# Patient Record
Sex: FEMALE | Race: OTHER | HISPANIC OR LATINO | URBAN - METROPOLITAN AREA
[De-identification: names, ages, dates, MRNs, and addresses within clinical notes are randomized per-mention and may not be internally consistent; named-entity substitution may affect disease eponyms.]

---

## 2017-09-22 ENCOUNTER — EMERGENCY (EMERGENCY)
Facility: HOSPITAL | Age: 32
LOS: 1 days | Discharge: HOME | End: 2017-09-22
Attending: EMERGENCY MEDICINE
Payer: COMMERCIAL

## 2017-09-22 VITALS
SYSTOLIC BLOOD PRESSURE: 109 MMHG | WEIGHT: 233.91 LBS | TEMPERATURE: 99 F | HEART RATE: 100 BPM | DIASTOLIC BLOOD PRESSURE: 74 MMHG | RESPIRATION RATE: 18 BRPM | OXYGEN SATURATION: 99 % | HEIGHT: 61 IN

## 2017-09-22 DIAGNOSIS — Z98.89 OTHER SPECIFIED POSTPROCEDURAL STATES: Chronic | ICD-10-CM

## 2017-09-22 PROBLEM — Z00.00 ENCOUNTER FOR PREVENTIVE HEALTH EXAMINATION: Status: ACTIVE | Noted: 2017-09-22

## 2017-09-22 PROCEDURE — 99283 EMERGENCY DEPT VISIT LOW MDM: CPT

## 2017-09-22 PROCEDURE — T1013: CPT

## 2017-09-22 PROCEDURE — 99284 EMERGENCY DEPT VISIT MOD MDM: CPT

## 2017-09-22 NOTE — ED STATDOCS - THROAT FINDINGS, MLM
Inflamed tonsils/OROPHARYNGEAL EXUDATE/uvula midline/no redness tender submandibular lymphadenopathy, normal voice/THROAT RED/OROPHARYNGEAL EXUDATE/uvula midline

## 2017-09-22 NOTE — ED STATDOCS - CARE PLAN
Principal Discharge DX:	Tonsillopharyngitis  Instructions for follow-up, activity and diet:	Keep well hydrated: drink lots of COLD fluids such as smoothies, sluppees, ice water.  Take antibiotics as prescribed.  Take ibuprofen (motrin or advil) 3 tablets every 6 hours for aches and pains.  Return immediately for re-examination if symptoms worsening.

## 2017-09-22 NOTE — ED STATDOCS - OBJECTIVE STATEMENT
33 y/o F pt with no pertinent past medical hx, presents to ED c/o a HA and sore throat. Pt  says she had subjective high fever. Yesterday she went to the Dr[facility]ber[facility] and got an abx shot and takes Augmentin that was given to her for her throat pain. She is having a hard time swallowing. Denies SOB, CP, abdominal pain, ur urinary sx. No further complaints at this time. 31 y/o F pt with no pertinent past medical hx, presents to ED c/o a HA and sore throat. Pt  says she had subjective high fever. Yesterday she went to the Dr. and got an abx shot and placed on Augmentin. Reports continued pain and diff swallowing. Denies SOB, CP, abdominal pain, ur urinary sx. No further complaints at this time.

## 2017-09-22 NOTE — ED STATDOCS - PLAN OF CARE
Keep well hydrated: drink lots of COLD fluids such as smoothies, sluppees, ice water.  Take antibiotics as prescribed.  Take ibuprofen (motrin or advil) 3 tablets every 6 hours for aches and pains.  Return immediately for re-examination if symptoms worsening.

## 2020-04-28 ENCOUNTER — INPATIENT (INPATIENT)
Facility: HOSPITAL | Age: 35
LOS: 11 days | Discharge: HOME | DRG: 819 | End: 2020-05-10
Attending: FAMILY MEDICINE | Admitting: HOSPITALIST
Payer: COMMERCIAL

## 2020-04-28 ENCOUNTER — OUTPATIENT (OUTPATIENT)
Dept: INPATIENT UNIT | Facility: HOSPITAL | Age: 35
LOS: 1 days | End: 2020-04-28
Payer: COMMERCIAL

## 2020-04-28 VITALS
HEIGHT: 65 IN | OXYGEN SATURATION: 99 % | DIASTOLIC BLOOD PRESSURE: 83 MMHG | HEART RATE: 90 BPM | SYSTOLIC BLOOD PRESSURE: 123 MMHG | RESPIRATION RATE: 20 BRPM | TEMPERATURE: 99 F | WEIGHT: 207.9 LBS

## 2020-04-28 VITALS
SYSTOLIC BLOOD PRESSURE: 136 MMHG | HEART RATE: 70 BPM | RESPIRATION RATE: 18 BRPM | DIASTOLIC BLOOD PRESSURE: 70 MMHG | TEMPERATURE: 98 F

## 2020-04-28 DIAGNOSIS — Z98.89 OTHER SPECIFIED POSTPROCEDURAL STATES: Chronic | ICD-10-CM

## 2020-04-28 DIAGNOSIS — O47.02 FALSE LABOR BEFORE 37 COMPLETED WEEKS OF GESTATION, SECOND TRIMESTER: ICD-10-CM

## 2020-04-28 DIAGNOSIS — E87.6 HYPOKALEMIA: ICD-10-CM

## 2020-04-28 LAB
ALBUMIN SERPL ELPH-MCNC: 3.5 G/DL — SIGNIFICANT CHANGE UP (ref 3.3–5.2)
ALP SERPL-CCNC: 103 U/L — SIGNIFICANT CHANGE UP (ref 40–120)
ALT FLD-CCNC: 9 U/L — SIGNIFICANT CHANGE UP
ANION GAP SERPL CALC-SCNC: 12 MMOL/L — SIGNIFICANT CHANGE UP (ref 5–17)
APPEARANCE UR: CLEAR — SIGNIFICANT CHANGE UP
AST SERPL-CCNC: 13 U/L — SIGNIFICANT CHANGE UP
BASOPHILS # BLD AUTO: 0.02 K/UL — SIGNIFICANT CHANGE UP (ref 0–0.2)
BASOPHILS NFR BLD AUTO: 0.2 % — SIGNIFICANT CHANGE UP (ref 0–2)
BILIRUB SERPL-MCNC: <0.2 MG/DL — LOW (ref 0.4–2)
BILIRUB UR-MCNC: NEGATIVE — SIGNIFICANT CHANGE UP
BUN SERPL-MCNC: 8 MG/DL — SIGNIFICANT CHANGE UP (ref 8–20)
CALCIUM SERPL-MCNC: 13.8 MG/DL — CRITICAL HIGH (ref 8.6–10.2)
CHLORIDE SERPL-SCNC: 101 MMOL/L — SIGNIFICANT CHANGE UP (ref 98–107)
CHLORIDE UR-SCNC: 48 MMOL/L — SIGNIFICANT CHANGE UP
CO2 SERPL-SCNC: 24 MMOL/L — SIGNIFICANT CHANGE UP (ref 22–29)
COLOR SPEC: YELLOW — SIGNIFICANT CHANGE UP
CREAT SERPL-MCNC: 0.49 MG/DL — LOW (ref 0.5–1.3)
DIFF PNL FLD: NEGATIVE — SIGNIFICANT CHANGE UP
EOSINOPHIL # BLD AUTO: 0.06 K/UL — SIGNIFICANT CHANGE UP (ref 0–0.5)
EOSINOPHIL NFR BLD AUTO: 0.5 % — SIGNIFICANT CHANGE UP (ref 0–6)
EPI CELLS # UR: SIGNIFICANT CHANGE UP
FERRITIN SERPL-MCNC: 87 NG/ML — SIGNIFICANT CHANGE UP (ref 15–150)
GLUCOSE SERPL-MCNC: 100 MG/DL — HIGH (ref 70–99)
GLUCOSE UR QL: NEGATIVE MG/DL — SIGNIFICANT CHANGE UP
HCG SERPL-ACNC: HIGH MIU/ML
HCT VFR BLD CALC: 31 % — LOW (ref 34.5–45)
HGB BLD-MCNC: 10.5 G/DL — LOW (ref 11.5–15.5)
IMM GRANULOCYTES NFR BLD AUTO: 0.3 % — SIGNIFICANT CHANGE UP (ref 0–1.5)
IRON SATN MFR SERPL: 26 UG/DL — LOW (ref 37–145)
IRON SATN MFR SERPL: 6 % — LOW (ref 14–50)
KETONES UR-MCNC: NEGATIVE — SIGNIFICANT CHANGE UP
LEUKOCYTE ESTERASE UR-ACNC: ABNORMAL
LYMPHOCYTES # BLD AUTO: 1.67 K/UL — SIGNIFICANT CHANGE UP (ref 1–3.3)
LYMPHOCYTES # BLD AUTO: 14.6 % — SIGNIFICANT CHANGE UP (ref 13–44)
MCHC RBC-ENTMCNC: 27.6 PG — SIGNIFICANT CHANGE UP (ref 27–34)
MCHC RBC-ENTMCNC: 33.9 GM/DL — SIGNIFICANT CHANGE UP (ref 32–36)
MCV RBC AUTO: 81.4 FL — SIGNIFICANT CHANGE UP (ref 80–100)
MONOCYTES # BLD AUTO: 0.72 K/UL — SIGNIFICANT CHANGE UP (ref 0–0.9)
MONOCYTES NFR BLD AUTO: 6.3 % — SIGNIFICANT CHANGE UP (ref 2–14)
NEUTROPHILS # BLD AUTO: 8.95 K/UL — HIGH (ref 1.8–7.4)
NEUTROPHILS NFR BLD AUTO: 78.1 % — HIGH (ref 43–77)
NITRITE UR-MCNC: NEGATIVE — SIGNIFICANT CHANGE UP
PH UR: 7 — SIGNIFICANT CHANGE UP (ref 5–8)
PLATELET # BLD AUTO: 269 K/UL — SIGNIFICANT CHANGE UP (ref 150–400)
POTASSIUM SERPL-MCNC: 2.7 MMOL/L — CRITICAL LOW (ref 3.5–5.3)
POTASSIUM SERPL-SCNC: 2.7 MMOL/L — CRITICAL LOW (ref 3.5–5.3)
POTASSIUM UR-SCNC: 12 MMOL/L — SIGNIFICANT CHANGE UP
PROT SERPL-MCNC: 6.7 G/DL — SIGNIFICANT CHANGE UP (ref 6.6–8.7)
PROT UR-MCNC: NEGATIVE MG/DL — SIGNIFICANT CHANGE UP
RBC # BLD: 3.81 M/UL — SIGNIFICANT CHANGE UP (ref 3.8–5.2)
RBC # FLD: 13.8 % — SIGNIFICANT CHANGE UP (ref 10.3–14.5)
RBC CASTS # UR COMP ASSIST: NEGATIVE /HPF — SIGNIFICANT CHANGE UP (ref 0–4)
SARS-COV-2 RNA SPEC QL NAA+PROBE: SIGNIFICANT CHANGE UP
SODIUM SERPL-SCNC: 137 MMOL/L — SIGNIFICANT CHANGE UP (ref 135–145)
SODIUM UR-SCNC: 55 MMOL/L — SIGNIFICANT CHANGE UP
SODIUM UR-SCNC: 56 MMOL/L — SIGNIFICANT CHANGE UP
SP GR SPEC: 1.01 — SIGNIFICANT CHANGE UP (ref 1.01–1.02)
TIBC SERPL-MCNC: 420 UG/DL — SIGNIFICANT CHANGE UP (ref 220–430)
TRANSFERRIN SERPL-MCNC: 294 MG/DL — SIGNIFICANT CHANGE UP (ref 192–382)
TSH SERPL-MCNC: 0.38 UIU/ML — SIGNIFICANT CHANGE UP (ref 0.27–4.2)
UROBILINOGEN FLD QL: NEGATIVE MG/DL — SIGNIFICANT CHANGE UP
VIT B12 SERPL-MCNC: 274 PG/ML — SIGNIFICANT CHANGE UP (ref 232–1245)
WBC # BLD: 11.46 K/UL — HIGH (ref 3.8–10.5)
WBC # FLD AUTO: 11.46 K/UL — HIGH (ref 3.8–10.5)
WBC UR QL: SIGNIFICANT CHANGE UP

## 2020-04-28 PROCEDURE — 93010 ELECTROCARDIOGRAM REPORT: CPT

## 2020-04-28 PROCEDURE — 99285 EMERGENCY DEPT VISIT HI MDM: CPT

## 2020-04-28 PROCEDURE — 76801 OB US < 14 WKS SINGLE FETUS: CPT | Mod: 26

## 2020-04-28 PROCEDURE — 99223 1ST HOSP IP/OBS HIGH 75: CPT

## 2020-04-28 PROCEDURE — 59025 FETAL NON-STRESS TEST: CPT

## 2020-04-28 PROCEDURE — G0463: CPT

## 2020-04-28 PROCEDURE — T1013: CPT

## 2020-04-28 RX ORDER — ACETAMINOPHEN 500 MG
650 TABLET ORAL EVERY 6 HOURS
Refills: 0 | Status: DISCONTINUED | OUTPATIENT
Start: 2020-04-28 | End: 2020-04-29

## 2020-04-28 RX ORDER — ONDANSETRON 8 MG/1
4 TABLET, FILM COATED ORAL ONCE
Refills: 0 | Status: DISCONTINUED | OUTPATIENT
Start: 2020-04-28 | End: 2020-04-28

## 2020-04-28 RX ORDER — POTASSIUM CHLORIDE 20 MEQ
10 PACKET (EA) ORAL ONCE
Refills: 0 | Status: COMPLETED | OUTPATIENT
Start: 2020-04-28 | End: 2020-04-28

## 2020-04-28 RX ORDER — DEXTROSE MONOHYDRATE, SODIUM CHLORIDE, AND POTASSIUM CHLORIDE 50; .745; 4.5 G/1000ML; G/1000ML; G/1000ML
1000 INJECTION, SOLUTION INTRAVENOUS
Refills: 0 | Status: DISCONTINUED | OUTPATIENT
Start: 2020-04-28 | End: 2020-05-04

## 2020-04-28 RX ORDER — POTASSIUM CHLORIDE 20 MEQ
40 PACKET (EA) ORAL ONCE
Refills: 0 | Status: COMPLETED | OUTPATIENT
Start: 2020-04-28 | End: 2020-04-28

## 2020-04-28 RX ORDER — ACETAMINOPHEN 500 MG
650 TABLET ORAL ONCE
Refills: 0 | Status: COMPLETED | OUTPATIENT
Start: 2020-04-28 | End: 2020-04-28

## 2020-04-28 RX ORDER — ONDANSETRON 8 MG/1
4 TABLET, FILM COATED ORAL EVERY 6 HOURS
Refills: 0 | Status: DISCONTINUED | OUTPATIENT
Start: 2020-04-28 | End: 2020-04-28

## 2020-04-28 RX ORDER — SODIUM CHLORIDE 9 MG/ML
1000 INJECTION INTRAMUSCULAR; INTRAVENOUS; SUBCUTANEOUS ONCE
Refills: 0 | Status: COMPLETED | OUTPATIENT
Start: 2020-04-28 | End: 2020-04-28

## 2020-04-28 RX ORDER — ONDANSETRON 8 MG/1
4 TABLET, FILM COATED ORAL EVERY 6 HOURS
Refills: 0 | Status: DISCONTINUED | OUTPATIENT
Start: 2020-04-28 | End: 2020-05-07

## 2020-04-28 RX ORDER — MAGNESIUM SULFATE 500 MG/ML
2 VIAL (ML) INJECTION ONCE
Refills: 0 | Status: COMPLETED | OUTPATIENT
Start: 2020-04-28 | End: 2020-04-28

## 2020-04-28 RX ORDER — HEPARIN SODIUM 5000 [USP'U]/ML
5000 INJECTION INTRAVENOUS; SUBCUTANEOUS EVERY 8 HOURS
Refills: 0 | Status: DISCONTINUED | OUTPATIENT
Start: 2020-04-28 | End: 2020-05-07

## 2020-04-28 RX ADMIN — DEXTROSE MONOHYDRATE, SODIUM CHLORIDE, AND POTASSIUM CHLORIDE 100 MILLILITER(S): 50; .745; 4.5 INJECTION, SOLUTION INTRAVENOUS at 21:23

## 2020-04-28 RX ADMIN — Medication 650 MILLIGRAM(S): at 21:33

## 2020-04-28 RX ADMIN — Medication 50 GRAM(S): at 20:11

## 2020-04-28 RX ADMIN — Medication 50 GRAM(S): at 16:00

## 2020-04-28 RX ADMIN — SODIUM CHLORIDE 1000 MILLILITER(S): 9 INJECTION INTRAMUSCULAR; INTRAVENOUS; SUBCUTANEOUS at 16:56

## 2020-04-28 RX ADMIN — SODIUM CHLORIDE 1000 MILLILITER(S): 9 INJECTION INTRAMUSCULAR; INTRAVENOUS; SUBCUTANEOUS at 13:21

## 2020-04-28 RX ADMIN — Medication 40 MILLIEQUIVALENT(S): at 16:55

## 2020-04-28 RX ADMIN — Medication 100 MILLIEQUIVALENT(S): at 18:44

## 2020-04-28 RX ADMIN — Medication 100 MILLIEQUIVALENT(S): at 17:36

## 2020-04-28 RX ADMIN — Medication 100 MILLIEQUIVALENT(S): at 16:00

## 2020-04-28 RX ADMIN — Medication 650 MILLIGRAM(S): at 13:21

## 2020-04-28 NOTE — ED ADULT NURSE NOTE - OBJECTIVE STATEMENT
35y/o female c/o head and hip pain s/p fall from syncopal episode. pt aox4, resp even unlabored, NSR. pt is currently 20 weeks pregnant, negative swelling to extremities. pt denies any complication of pregnancy. pt states to feel weak x few days. will continue to monitor

## 2020-04-28 NOTE — ED PROVIDER NOTE - PHYSICAL EXAMINATION
Gen: no acute distress  Head: normocephalic, atraumatic  Lung: CTAB, no respiratory distress, no wheezing, rales, rhonchi  CV: normal s1/s2, rrr,   Abd: soft, non-tender, non-distended  MSK: No edema, no visible deformities, full range of motion in all 4 extremities  Back: no c/t/l midline tenderness or stepoffs  Neuro: No focal neurologic deficits, 5/5 strength to all extremities, sensation intact, CN 2-12 intact   Skin: No rash   Psych: normal affect

## 2020-04-28 NOTE — ED PROVIDER NOTE - CLINICAL SUMMARY MEDICAL DECISION MAKING FREE TEXT BOX
35 y/o F pt with no pmhx  currently 14 weeks pregnant presenting today after syncopal episode. No PE risk factors. EKG non-concerning. VSS. Pt cleared by OB. Will check labs, ekg, fluids, US pelvis to r/o ectopic and reeval.

## 2020-04-28 NOTE — ED PROVIDER NOTE - CARE PLAN
Principal Discharge DX:	Hypokalemia  Secondary Diagnosis:	Hypercalcemia  Secondary Diagnosis:	Pregnancy  Secondary Diagnosis:	Syncope

## 2020-04-28 NOTE — H&P ADULT - HISTORY OF PRESENT ILLNESS
35 y/o F pt  currently 14 weeks pregnant presenting today after a syncopal episode while at the grocery store. Pt states that she was standing in line when she began to feel weak and her vision slowly faded and then passed out and she woke up on the ground. Pt states she hit her head, is currently c/o headache   Pt denies any sick contact ,no leg pain , no vision problems , she admits feeling weak for few weeks , also has nausea , appetite is fair  Denies any leg pain, leg swelling. Pt denies any chest pain, dyspnea, fevers, abdominal pain, dysuria, cough, congestion, sore throat, neck pain, weakness, numbness, tingling, or other complaint. Pt was seen and evaluated by OB and cleared prior to presenting to the ED.  Labs result in the noted to have K 2.7 , high calcium admitted for severe electrolyte depletion

## 2020-04-28 NOTE — H&P ADULT - NSHPLABSRESULTS_GEN_ALL_CORE
10.5   11.46 )-----------( 269      ( 28 Apr 2020 13:00 )             31.0   04-28    137  |  101  |  8.0  ----------------------------<  100<H>  2.7<LL>   |  24.0  |  0.49<L>    Ca    13.8<HH>      28 Apr 2020 14:06  Mg     1.3     04-28    TPro  6.7  /  Alb  3.5  /  TBili  <0.2<L>  /  DBili  x   /  AST  13  /  ALT  9   /  AlkPhos  103  04-28

## 2020-04-28 NOTE — ED ADULT NURSE REASSESSMENT NOTE - NS ED NURSE REASSESS COMMENT FT1
Pt resting comfortably without any distress present. Respirations even and unlabored without any complaints. RN to continue to monitor and reassess closely.

## 2020-04-28 NOTE — OB PROVIDER TRIAGE NOTE - HISTORY OF PRESENT ILLNESS
Patient is a 35yo  at 14 weeks and 4 days coming in s/p syncopal episode. Patient was at work and lost consciousness. She feel and hit her head, did not have any convulsions noted or loss of bladder or bowel function. Is now feeling some weakness and having buttock and head pain. Denies vision changes or dizziness, N/V. Has never had an episode like this before. Denies cxts, LOF, VB. Has not yet felt fetal movement.   Pregnancy uncomplicated.    PMH: none  PSH: none  Meds: PNV  Allergies: none  OBhx: CSx2    US: +, +fetal movement   VE: deferred     A/P: Patient is a 35yo  at 14 weeks and 4 days coming in s/p syncopal episode.  -Cleared obstetrically  -Will transfer to ED for further w/up    D/W Dr. Leal

## 2020-04-28 NOTE — ED PROCEDURE NOTE - ATTENDING CONTRIBUTION TO CARE
I, Willy Logan, performed the initial face to face bedside interview with this patient regarding history of present illness, review of symptoms and relevant past medical, social and family history.  I completed an independent physical examination.  I was the initial provider who evaluated this patient. I have signed out the follow up of any pending tests (i.e. labs, radiological studies) to the resident  I have communicated the patient’s plan of care and disposition with the resident

## 2020-04-28 NOTE — ED PROVIDER NOTE - PROGRESS NOTE DETAILS
T 153. - Jim Ferrera, PGY-1 Pt noted to be markedly hypercalcemic and hypokalemic. Will order K, check mag, give fluids and reassess. - Jim Ferrera, PGY-1 Reviewed all results with pt as well as plans for admission. Pt is comfortable with plan for admission. Questions answered.

## 2020-04-28 NOTE — ED PROVIDER NOTE - DATE/TIME 2
"Discharge Planner OT   Patient plan for discharge: Not stated  Current status: Per nursing, pt okay to see. Pt agreed to therapy. Pt declined to get up to EOB. Attempted number cancellation w/patient, however pt unable to follow commands to complete tasks. Placed chapstick and washcloth to pt's left side to engage pt in visual scanning. Pt unable to locate items. Placed chapstick in Pt L hand and instructed pt to put on. Pt unable to follow commands and stated \"my siblings are a lot\"  Barriers to return to prior living situation: Level of A required, impaired mobility and ADL  Recommendations for discharge: TCU  Rationale for recommendations: Pt will require continued therapy intervention to improve safety and indep w/ ADL's and functional mobility prior to returning home.         Entered by: Anuja Nelson 06/14/2019 8:36 AM       " 28-Apr-2020 15:02

## 2020-04-28 NOTE — ED PROVIDER NOTE - ATTENDING CONTRIBUTION TO CARE
The patient seen and examined    Syncope  Hypokalemia    I, Willy Logan, performed the initial face to face bedside interview with this patient regarding history of present illness, review of symptoms and relevant past medical, social and family history.  I completed an independent physical examination.  I was the initial provider who evaluated this patient. I have signed out the follow up of any pending tests (i.e. labs, radiological studies) to the resident.  I have communicated the patient’s plan of care and disposition with the resident.

## 2020-04-28 NOTE — H&P ADULT - ASSESSMENT
33 yo F 20 weeks pregnant admitted for syncope with severe electrolyte imbalance hypokalemia , hypercalcemia and hypomagnesemia     1- Syncope liekly secondary due to severe electrolyte  depletion   replace K, mg , iv hydration   cardiac monitor     2- Electrolyte depletion / Severe Hypokalemia  / hypomagnesemia   replace iv K and Po ordered , iv mag 2 gm x2   add K to iv fluid   renal consulted   monitor electrolytes closely   UA urine electrolytes   renin aldosterone added for hypokalemia     3- Hypercalcemia   add iv fluid , monitor   PTH ordered     4- Pregnancy second trimester   ob gyn was consulted by ED   cleared stable    DVT prophylaxis

## 2020-04-28 NOTE — H&P ADULT - NSHPPHYSICALEXAM_GEN_ALL_CORE
Vital Signs Last 24 Hrs  T(C): 37.1 (28 Apr 2020 12:28), Max: 37.1 (28 Apr 2020 12:28)  T(F): 98.7 (28 Apr 2020 12:28), Max: 98.7 (28 Apr 2020 12:28)  HR: 90 (28 Apr 2020 12:28) (70 - 90)  BP: 123/83 (28 Apr 2020 12:28) (123/83 - 136/70)  BP(mean): --  RR: 20 (28 Apr 2020 12:28) (18 - 20)  SpO2: 99% (28 Apr 2020 12:28) (99% - 99%)  General : awake alert no distress   Mouth : dry oral mucosa   Neck Supple , no JVD   Resp : CTA bilateral , no wheezing   Heart : regular s1 /s2 , no murmur   Gastro : soft obese ,no tenderness , BS positive   EXT : no pretibial edema , no calf tenderness   Skin : pale color   Neuro : AXOX3,  normal speech , no motor deficits   Psychiatry : normal affect

## 2020-04-28 NOTE — ED ADULT TRIAGE NOTE - CHIEF COMPLAINT QUOTE
BIBA co headache and hip pain after a syncopal event today, pt remembers paying for her groceries and then being on the floor. pt is 20 weeks pregnant denies any fevers, cough, SOB, chest pain. pt also denies any vaginal bleeding and was cleared by Kristy CALHOUN

## 2020-04-29 LAB
24R-OH-CALCIDIOL SERPL-MCNC: 13.1 NG/ML — LOW (ref 30–80)
ALDOST SERPL-MCNC: <3 NG/DL — SIGNIFICANT CHANGE UP
ANION GAP SERPL CALC-SCNC: 13 MMOL/L — SIGNIFICANT CHANGE UP (ref 5–17)
BUN SERPL-MCNC: 10 MG/DL — SIGNIFICANT CHANGE UP (ref 8–20)
CALCIUM SERPL-MCNC: 12.7 MG/DL — HIGH (ref 8.6–10.2)
CALCIUM SERPL-MCNC: 12.9 MG/DL — HIGH (ref 8.4–10.5)
CALCIUM UR-MCNC: 10 MG/DL — SIGNIFICANT CHANGE UP
CHLORIDE SERPL-SCNC: 105 MMOL/L — SIGNIFICANT CHANGE UP (ref 98–107)
CO2 SERPL-SCNC: 21 MMOL/L — LOW (ref 22–29)
CREAT SERPL-MCNC: 0.46 MG/DL — LOW (ref 0.5–1.3)
GLUCOSE SERPL-MCNC: 94 MG/DL — SIGNIFICANT CHANGE UP (ref 70–99)
MAGNESIUM SERPL-MCNC: 1.6 MG/DL — SIGNIFICANT CHANGE UP (ref 1.6–2.6)
PHOSPHATE 24H UR-MCNC: 9.3 MG/DL — SIGNIFICANT CHANGE UP
PHOSPHATE SERPL-MCNC: 1.8 MG/DL — LOW (ref 2.4–4.7)
POTASSIUM SERPL-MCNC: 3.1 MMOL/L — LOW (ref 3.5–5.3)
POTASSIUM SERPL-SCNC: 3.1 MMOL/L — LOW (ref 3.5–5.3)
PTH-INTACT FLD-MCNC: 76 PG/ML — HIGH (ref 15–65)
RENIN DIRECT, PLASMA: 13.1 PG/ML — SIGNIFICANT CHANGE UP
SODIUM SERPL-SCNC: 139 MMOL/L — SIGNIFICANT CHANGE UP (ref 135–145)
TSH SERPL-MCNC: 0.65 UIU/ML — SIGNIFICANT CHANGE UP (ref 0.27–4.2)
UUN UR-MCNC: 118 MG/DL — SIGNIFICANT CHANGE UP

## 2020-04-29 PROCEDURE — 93306 TTE W/DOPPLER COMPLETE: CPT | Mod: 26

## 2020-04-29 PROCEDURE — 99255 IP/OBS CONSLTJ NEW/EST HI 80: CPT

## 2020-04-29 PROCEDURE — 93010 ELECTROCARDIOGRAM REPORT: CPT

## 2020-04-29 PROCEDURE — 99233 SBSQ HOSP IP/OBS HIGH 50: CPT

## 2020-04-29 RX ORDER — SODIUM,POTASSIUM PHOSPHATES 278-250MG
1 POWDER IN PACKET (EA) ORAL THREE TIMES A DAY
Refills: 0 | Status: COMPLETED | OUTPATIENT
Start: 2020-04-29 | End: 2020-04-30

## 2020-04-29 RX ORDER — FERROUS SULFATE 325(65) MG
325 TABLET ORAL DAILY
Refills: 0 | Status: DISCONTINUED | OUTPATIENT
Start: 2020-04-29 | End: 2020-05-07

## 2020-04-29 RX ORDER — ERGOCALCIFEROL 1.25 MG/1
50000 CAPSULE ORAL
Refills: 0 | Status: DISCONTINUED | OUTPATIENT
Start: 2020-04-29 | End: 2020-04-29

## 2020-04-29 RX ORDER — PREGABALIN 225 MG/1
1000 CAPSULE ORAL DAILY
Refills: 0 | Status: COMPLETED | OUTPATIENT
Start: 2020-04-29 | End: 2020-04-30

## 2020-04-29 RX ORDER — MAGNESIUM SULFATE 500 MG/ML
2 VIAL (ML) INJECTION
Refills: 0 | Status: COMPLETED | OUTPATIENT
Start: 2020-04-29 | End: 2020-04-29

## 2020-04-29 RX ORDER — IRON SUCROSE 20 MG/ML
200 INJECTION, SOLUTION INTRAVENOUS EVERY 24 HOURS
Refills: 0 | Status: COMPLETED | OUTPATIENT
Start: 2020-04-29 | End: 2020-04-30

## 2020-04-29 RX ORDER — POTASSIUM PHOSPHATE, MONOBASIC POTASSIUM PHOSPHATE, DIBASIC 236; 224 MG/ML; MG/ML
15 INJECTION, SOLUTION INTRAVENOUS ONCE
Refills: 0 | Status: COMPLETED | OUTPATIENT
Start: 2020-04-29 | End: 2020-04-29

## 2020-04-29 RX ORDER — ASCORBIC ACID 60 MG
1000 TABLET,CHEWABLE ORAL DAILY
Refills: 0 | Status: DISCONTINUED | OUTPATIENT
Start: 2020-04-29 | End: 2020-05-07

## 2020-04-29 RX ORDER — MAGNESIUM OXIDE 400 MG ORAL TABLET 241.3 MG
400 TABLET ORAL
Refills: 0 | Status: COMPLETED | OUTPATIENT
Start: 2020-04-29 | End: 2020-05-01

## 2020-04-29 RX ORDER — ACETAMINOPHEN 500 MG
975 TABLET ORAL EVERY 6 HOURS
Refills: 0 | Status: DISCONTINUED | OUTPATIENT
Start: 2020-04-29 | End: 2020-05-07

## 2020-04-29 RX ADMIN — Medication 325 MILLIGRAM(S): at 11:36

## 2020-04-29 RX ADMIN — Medication 50 GRAM(S): at 12:42

## 2020-04-29 RX ADMIN — Medication 650 MILLIGRAM(S): at 05:02

## 2020-04-29 RX ADMIN — DEXTROSE MONOHYDRATE, SODIUM CHLORIDE, AND POTASSIUM CHLORIDE 100 MILLILITER(S): 50; .745; 4.5 INJECTION, SOLUTION INTRAVENOUS at 12:21

## 2020-04-29 RX ADMIN — Medication 1 PACKET(S): at 13:45

## 2020-04-29 RX ADMIN — IRON SUCROSE 110 MILLIGRAM(S): 20 INJECTION, SOLUTION INTRAVENOUS at 11:35

## 2020-04-29 RX ADMIN — MAGNESIUM OXIDE 400 MG ORAL TABLET 400 MILLIGRAM(S): 241.3 TABLET ORAL at 18:16

## 2020-04-29 RX ADMIN — Medication 1 PACKET(S): at 21:33

## 2020-04-29 RX ADMIN — POTASSIUM PHOSPHATE, MONOBASIC POTASSIUM PHOSPHATE, DIBASIC 62.5 MILLIMOLE(S): 236; 224 INJECTION, SOLUTION INTRAVENOUS at 13:41

## 2020-04-29 RX ADMIN — MAGNESIUM OXIDE 400 MG ORAL TABLET 400 MILLIGRAM(S): 241.3 TABLET ORAL at 11:36

## 2020-04-29 RX ADMIN — Medication 1 TABLET(S): at 11:36

## 2020-04-29 RX ADMIN — HEPARIN SODIUM 5000 UNIT(S): 5000 INJECTION INTRAVENOUS; SUBCUTANEOUS at 13:50

## 2020-04-29 RX ADMIN — ERGOCALCIFEROL 50000 UNIT(S): 1.25 CAPSULE ORAL at 12:15

## 2020-04-29 RX ADMIN — PREGABALIN 1000 MICROGRAM(S): 225 CAPSULE ORAL at 12:15

## 2020-04-29 RX ADMIN — Medication 1000 MILLIGRAM(S): at 12:10

## 2020-04-29 RX ADMIN — Medication 975 MILLIGRAM(S): at 13:50

## 2020-04-29 RX ADMIN — Medication 50 GRAM(S): at 18:15

## 2020-04-29 RX ADMIN — HEPARIN SODIUM 5000 UNIT(S): 5000 INJECTION INTRAVENOUS; SUBCUTANEOUS at 21:32

## 2020-04-29 NOTE — CONSULT NOTE ADULT - ATTENDING COMMENTS
Pt was seen and examined. Pt with episode of syncope. No prior syncope. No leg edema or calf tenderness.   EKG with NSR, no st/t changes, qtc short with hypokalemia.  Denies any use of diuretics.   Pt is being evaluated by nephrology.  TTE normal today.  Monitor on tele.  Plan for outpt event monitor.   I alexy with a/p.

## 2020-04-29 NOTE — PROGRESS NOTE ADULT - ASSESSMENT
35 yo F 14 weeks pregnant ( per her GYN attending ) , reported recent COVID positive test admittted for syncope , severe hypokalemia , hypomagnesemia and hypercalcemia     1- Syncope likely due to electrolyte imbalance   replace lytes   cont cardiac monitor   will do repeat ECG     2- Hypokalemia / hypomagnesemi/ hypophosphatemia   replace iv mg , K phos iv and K supplement ordered   repeat lytes in am     3- Hypercalcemia better with hydration further work up pending per renal   cont iv fluid suspected dehydration     4- Anemia of iron deficiency and vit b12 low   add ferrous supplement   vit b 12 injection x2 days then po     5- Vit D deficiency   ergocalciferol ordered     6- Recent COVID infection positive 3/26 per her gyn attending outpatient records   repeat neg on 4/22 and 4/29

## 2020-04-29 NOTE — CONSULT NOTE ADULT - ASSESSMENT
A/P:  33 y/o F  currently 14 weeks pregnant with no PMHx who presented to the ED after a syncopal episode. Patient was feeling well after recovering from Covid a month ago, but yesterday she was at the grocery store when suddenly she saw black, and woke up on the ground. Patient states she hit her head, and has been having a headache ever since. Patient denies any palpitations, dizziness, nausea, or vomitting before the syncopal episode occurred. Patient states she had chest pain about 9 mos ago, but was diagnosed with musculoskeletal pain. Patient was found upon arrival to have hypokalemia, hypomagnesia, and hypercalcemia. Patient is being followed by Nephrology who is working her up for possible Gitelman's Syndrome. Patient was found to have PVCs on EKG and telemetry, and had Mobitz I. Patient is currently feeling ok, still with a headache, but denies any fevers, chills, chest pain, dyspnea, orthopnea, PND, leg swelling, abdominal pain, N/V/D.     1. Syncope  - Concern for arrhythmia secondary to severe electrolyte disturbances  - Keep K>4, Mg>2 if able  - Obtain TTE  - Continue telemetry monitoring.   - Will set up for 21 day MCOT monitor as an outpatient.     2. Electrolyte Disturbances  - Possible Gitelman's Syndrome  - Renal following  - Management per the primary team.     3. Pregnancy  - Management per primary team.     Preliminary evaluation, please await official recommendations by Dr. Renner

## 2020-04-29 NOTE — CONSULT NOTE ADULT - SUBJECTIVE AND OBJECTIVE BOX
[place] [facility]                                                               Office:  [address] [address]                                                              Telephone: [phone]. Fax:[phone]                                                                        CARDIOLOGY CONSULTATION NOTE                                                                                             Consult requested by:  Dr. Parker   Reason for Consultation: Syncope  History obtained by: Patient and medical record   obtained: No    Chief complaint:    Patient is a 34y old  Female who presents with a chief complaint of hypokalemia, syncope (2020 11:11)      HPI: 33 y/o F  currently 14 weeks pregnant with no PMHx who presented to the ED after a syncopal episode.   33 y/o F pt  currently 14 weeks pregnant presenting today after a syncopal episode while at the grocery store. Pt states that she was standing in line when she began to feel weak and her vision slowly faded and then passed out and she woke up on the ground. Pt states she hit her head, is currently c/o headache   Pt denies any sick contact ,no leg pain , no vision problems , she admits feeling weak for few weeks , also has nausea , appetite is fair  Denies any leg pain, leg swelling. Pt denies any chest pain, dyspnea, fevers, abdominal pain, dysuria, cough, congestion, sore throat, neck pain, weakness, numbness, tingling, or other complaint. Pt was seen and evaluated by OB and cleared prior to presenting to the ED.  Labs result in the noted to have K 2.7 , high calcium admitted for severe electrolyte depletion (2020 16:15)        REVIEW OF SYMPTOMS:     CONSTITUTIONAL: No fever, weight loss, or fatigue  ENMT:  No difficulty hearing, tinnitus, vertigo; No sinus or throat pain  NECK: No pain or stiffness  CARDIOVASCULAR: chest pain, dyspnea, syncope, palpitations, dizziness, Orthopnea, Paroxsymal nocturnal dyspnea  RESPIRATORY: Dyspnea on exertion, Shortness of breath, cough, wheezing  : No dysuria, no hematuria   GI: No dark color stool, no melena, no diarrhea, no constipation, no abdominal pain   NEURO: No headache, no dizziness, no slurred speech   MUSCULOSKELETAL: No joint pain or swelling; No muscle, back, or extremity pain  PSYCH: No agitation, no anxiety.    ALL OTHER REVIEW OF SYSTEMS ARE NEGATIVE.      PREVIOUS DIAGNOSTIC TESTING  ECHO FINDINGS:      STRESS FINDINGS:      CATHETERIZATION FINDINGS:         ALLERGIES: Allergies    No Known Allergies    Intolerances          PAST MEDICAL HISTORY  No pertinent past medical history      PAST SURGICAL HISTORY  S/P       FAMILY HISTORY:      SOCIAL HISTORY:  Denies smoking/alcohol/drugs  CIGARETTES:     ALCOHOL:  DRUGS:      CURRENT MEDICATIONS:     ascorbic acid  cyanocobalamin Injectable  ferrous    sulfate  heparin   Injectable  iron sucrose IVPB  magnesium oxide  magnesium sulfate  IVPB  potassium phosphate / sodium phosphate powder  prenatal multivitamin  sodium chloride 0.9% with potassium chloride 40 mEq/L        HOME MEDICATIONS:      Vital Signs Last 24 Hrs  T(C): 37.1 (2020 10:11), Max: 37.1 (2020 17:22)  T(F): 98.8 (2020 10:11), Max: 98.8 (2020 17:22)  HR: 85 (2020 10:11) (80 - 96)  BP: 116/74 (2020 10:11) (100/63 - 124/75)  BP(mean): --  RR: 18 (2020 10:11) (16 - 18)  SpO2: 96% (2020 10:11) (96% - 99%)      PHYSICAL EXAM:  Constitutional: Comfortable . No acute distress.   HEENT: Atraumatic and normocephalic , neck is supple . no JVD. No carotid bruit. PEERL   CNS: A&Ox3. No focal deficits. EOMI. Cranial nerves II-IX are intact.   Lymph Nodes: Cervical : Not palpable.  Respiratory: CTAB  Cardiovascular: S1S2 RRR. No murmur/rubs or gallop.  Gastrointestinal: Soft non-tender and non distended . +Bowel sounds. negative Pepe's sign.  Extremities: No edema.   Psychiatric: Calm . no agitation.  Skin: No skin rash/ulcers visualized to face, hands or feet.    Intake and output:     LABS:                        10.5   11.46 )-----------( 269      ( 2020 13:00 )             31.0     -    139  |  105  |  10.0  ----------------------------<  94  3.1<L>   |  21.0<L>  |  0.46<L>    Ca    12.7<H>      2020 06:41  Phos  1.8       Mg     1.6     04-29    TPro  6.7  /  Alb  3.5  /  TBili  <0.2<L>  /  DBili  x   /  AST  13  /  ALT  9   /  AlkPhos  103  -      ;p-BNP=    Urinalysis Basic - ( 2020 22:15 )    Color: Yellow / Appearance: Clear / S.010 / pH: x  Gluc: x / Ketone: Negative  / Bili: Negative / Urobili: Negative mg/dL   Blood: x / Protein: Negative mg/dL / Nitrite: Negative   Leuk Esterase: Small / RBC: Negative /HPF / WBC 0-2   Sq Epi: x / Non Sq Epi: Occasional / Bacteria: x        INTERPRETATION OF TELEMETRY: Reviewed by me.   ECG: Reviewed by me.     RADIOLOGY & ADDITIONAL STUDIES:    X-ray:  reviewed by me.   CT scan:   MRI: [place] [facility]                                                               Office:  [address] [address]                                                              Telephone: [phone]. Fax:[phone]                                                                        CARDIOLOGY CONSULTATION NOTE                                                                                             Consult requested by:  Dr. Parker   Reason for Consultation: Syncope  History obtained by: Patient and medical record   obtained: No    Chief complaint:    Patient is a 34y old  Female who presents with a chief complaint of hypokalemia, syncope (2020 11:11)      HPI: 33 y/o F  currently 14 weeks pregnant with no PMHx who presented to the ED after a syncopal episode. Patient was feeling well after recovering from Covid a month ago, but yesterday she was at the grocery store when suddenly she saw black, and woke up on the ground. Patient states she hit her head, and has been having a headache ever since. Patient denies any palpitations, dizziness, nausea, or vomitting before the syncopal episode occurred. Patient states she had chest pain about 9 mos ago, but was diagnosed with musculoskeletal pain. Patient was found upon arrival to have hypokalemia, hypomagnesia, and hypercalcemia. Patient is being followed by Nephrology who is working her up for possible Gitelman's Syndrome. Patient was found to have PVCs on EKG and telemetry, and had Mobitz I. Patient is currently feeling ok, still with a headache, but denies any fevers, chills, chest pain, dyspnea, orthopnea, PND, leg swelling, abdominal pain, N/V/D.     REVIEW OF SYMPTOMS:     CONSTITUTIONAL: No fever, weight loss, or fatigue  ENMT:  No difficulty hearing, tinnitus, vertigo; No sinus or throat pain  NECK: No pain or stiffness  CARDIOVASCULAR: AS PER HPI  RESPIRATORY: Dyspnea on exertion, Shortness of breath, cough, wheezing  : No dysuria, no hematuria   GI: No dark color stool, no melena, no diarrhea, no constipation, no abdominal pain   NEURO: AS PER HPI  MUSCULOSKELETAL: No joint pain or swelling; No muscle, back, or extremity pain  PSYCH: No agitation, no anxiety.    ALL OTHER REVIEW OF SYSTEMS ARE NEGATIVE.      PREVIOUS DIAGNOSTIC TESTING  ECHO FINDINGS: Pending    ALLERGIES: Allergies    No Known Allergies    Intolerances      PAST MEDICAL HISTORY  No pertinent past medical history      PAST SURGICAL HISTORY  S/P       FAMILY HISTORY: Mother- Hypokalemia  Father- "heart issues" at 68      SOCIAL HISTORY:  Denies smoking/alcohol/drugs    CURRENT MEDICATIONS:     ascorbic acid  cyanocobalamin Injectable  ferrous    sulfate  heparin   Injectable  iron sucrose IVPB  magnesium oxide  magnesium sulfate  IVPB  potassium phosphate / sodium phosphate powder  prenatal multivitamin  sodium chloride 0.9% with potassium chloride 40 mEq/L      HOME MEDICATIONS:    Vital Signs Last 24 Hrs  T(C): 37.1 (2020 10:11), Max: 37.1 (2020 17:22)  T(F): 98.8 (2020 10:11), Max: 98.8 (2020 17:22)  HR: 85 (2020 10:11) (80 - 96)  BP: 116/74 (2020 10:11) (100/63 - 124/75)  RR: 18 (2020 10:11) (16 - 18)  SpO2: 96% (2020 10:11) (96% - 99%)      PHYSICAL EXAM:  Constitutional: Comfortable . No acute distress.   HEENT: Atraumatic and normocephalic , neck is supple . no JVD. No carotid bruit. PEERL   CNS: A&Ox3. No focal deficits. EOMI. Cranial nerves II-IX are intact.   Lymph Nodes: Cervical : Not palpable.  Respiratory: CTAB  Cardiovascular: S1S2 RRR. No murmur/rubs or gallop.  Gastrointestinal: Soft non-tender and non distended . +Bowel sounds. negative Pepe's sign.  Extremities: No edema.   Psychiatric: Calm . no agitation.  Skin: No skin rash/ulcers visualized to face, hands or feet.    Intake and output:     LABS:                        10.5   11.46 )-----------( 269      ( 2020 13:00 )             31.0     04-29    139  |  105  |  10.0  ----------------------------<  94  3.1<L>   |  21.0<L>  |  0.46<L>    Ca    12.7<H>      2020 06:41  Phos  1.8       Mg     1.6         TPro  6.7  /  Alb  3.5  /  TBili  <0.2<L>  /  DBili  x   /  AST  13  /  ALT  9   /  AlkPhos  103  -      ;p-BNP=    Urinalysis Basic - ( 2020 22:15 )    Color: Yellow / Appearance: Clear / S.010 / pH: x  Gluc: x / Ketone: Negative  / Bili: Negative / Urobili: Negative mg/dL   Blood: x / Protein: Negative mg/dL / Nitrite: Negative   Leuk Esterase: Small / RBC: Negative /HPF / WBC 0-2   Sq Epi: x / Non Sq Epi: Occasional / Bacteria: x        INTERPRETATION OF TELEMETRY: Reviewed by me. SR with 1st degree AV block, PVCs, Mobitz I  ECG: Reviewed by me. SR 1st degree AV block, PVCs, NSST/T wave abnormalities     RADIOLOGY & ADDITIONAL STUDIES:

## 2020-04-29 NOTE — PROGRESS NOTE ADULT - SUBJECTIVE AND OBJECTIVE BOX
Patient is a 34y old  Female who presents with a chief complaint of hypokalemia, syncope  seen by  Chelsey , pt is c/o headache on the right and was feeling dizzy walking to the bathroom earlier   denies calf tenderness , no CP no SOB     * spoke to her GYN Dr Pepe apparently pt was tested positive fr COVID in  in [facility] , repeat COVID testing neg * , pt denied when asked yesterday     labs reviewed     cardiac monitor with ST     Allergies    No Known Allergies    Intolerances          Vital Signs Last 24 Hrs  T(C): 37.1 (2020 07:28), Max: 37.1 (2020 12:28)  T(F): 98.8 (2020 07:28), Max: 98.8 (2020 17:22)  HR: 83 (2020 07:28) (70 - 96)  BP: 102/65 (2020 07:28) (100/63 - 136/70)  BP(mean): --  RR: 18 (2020 07:28) (16 - 20)  SpO2: 99% (2020 07:28) (98% - 99%)    PHYSICAL EXAM:    GENERAL: NAD, well-groomed, well-developed  EYES: conjunctiva and sclera clear  NECK: Supple, No JVD,   NERVOUS SYSTEM:  Alert & Oriented X3, Good concentration; no motor deficits   CHEST/LUNG: Clear to auscultation bilaterally; No rales, rhonchi  HEART: Regular rate and rhythm; S1 /s2 tachycardic   ABDOMEN: Soft, Nontender, Nondistended; Bowel sounds present  EXTREMITIES:  No clubbing, cyanosis, or edema        LABS:                        10.5   11.46 )-----------( 269      ( 2020 13:00 )             31.0     04-29    139  |  105  |  10.0  ----------------------------<  94  3.1<L>   |  21.0<L>  |  0.46<L>    Ca    12.7<H>      2020 06:41  Phos  1.8       Mg     1.6         TPro  6.7  /  Alb  3.5  /  TBili  <0.2<L>  /  DBili  x   /  AST  13  /  ALT  9   /  AlkPhos  103    Vitamin D, 25-Hydroxy: 13.1: VITD Interpretive Data Result:  30.0-80.0 ng/mL Optimal levels (Reference Range)      Vitamin B12, Serum: 274 pg/mL (20 @ 18:11)        Urinalysis Basic - ( 2020 22:15 )    Color: Yellow / Appearance: Clear / S.010 / pH: x  Gluc: x / Ketone: Negative  / Bili: Negative / Urobili: Negative mg/dL   Blood: x / Protein: Negative mg/dL / Nitrite: Negative   Leuk Esterase: Small / RBC: Negative /HPF / WBC 0-2   Sq Epi: x / Non Sq Epi: Occasional / Bacteria: x        RADIOLOGY & ADDITIONAL TESTS:

## 2020-04-29 NOTE — CONSULT NOTE ADULT - SUBJECTIVE AND OBJECTIVE BOX
[facility] DIVISION OF KIDNEY DISEASES AND HYPERTENSION -- INITIAL CONSULT NOTE  --------------------------------------------------------------------------------  HPI:  35 y/o female pt ( currently 14 weeks pregnant )presented after a syncopal episode while at the grocery store. Pt reported feeling weak when standing in line, her vision faded slowly and she then woke up on the ground. Pt was tested positive for COVID-19 in march but repeat testing in ED was negative. Pt found to have hypokalemia, hypercalcemia and hypermagnesemia.      PAST HISTORY  --------------------------------------------------------------------------------  PAST MEDICAL & SURGICAL HISTORY:  No pertinent past medical history  S/P : x2    FAMILY HISTORY: FH of hypokalemia in mother    PAST SOCIAL HISTORY:     ALLERGIES & MEDICATIONS  --------------------------------------------------------------------------------  Allergies    No Known Allergies    Intolerances      Standing Inpatient Medications  ascorbic acid 1000 milliGRAM(s) Oral daily  cyanocobalamin Injectable 1000 MICROGram(s) SubCutaneous daily  ergocalciferol 67742 Unit(s) Oral every week  ferrous    sulfate 325 milliGRAM(s) Oral daily  heparin   Injectable 5000 Unit(s) SubCutaneous every 8 hours  iron sucrose IVPB 200 milliGRAM(s) IV Intermittent every 24 hours  magnesium oxide 400 milliGRAM(s) Oral three times a day with meals  magnesium sulfate  IVPB 2 Gram(s) IV Intermittent every 1 hour  potassium phosphate / sodium phosphate powder 1 Packet(s) Oral three times a day  potassium phosphate IVPB 15 milliMole(s) IV Intermittent once  prenatal multivitamin 1 Tablet(s) Oral daily  sodium chloride 0.9% with potassium chloride 40 mEq/L 1000 milliLiter(s) IV Continuous <Continuous>    PRN Inpatient Medications  acetaminophen   Tablet .. 975 milliGRAM(s) Oral every 6 hours PRN  ondansetron Injectable 4 milliGRAM(s) IV Push every 6 hours PRN      REVIEW OF SYSTEMS  --------------------------------------------------------------------------------  Gen: No weight changes, fatigue, fevers/chills, weakness  Skin: No rashes  Head/Eyes/Ears/Mouth: No headache; Normal hearing; Normal vision w/o blurriness; No sinus pain/discomfort, sore throat  Respiratory: No dyspnea, cough, wheezing, hemoptysis  CV: No chest pain, PND, orthopnea  GI: No abdominal pain, diarrhea, constipation, nausea, vomiting, melena, hematochezia  : No increased frequency, dysuria, hematuria, nocturia  MSK: No joint pain/swelling; no back pain; no edema  Neuro: No dizziness/lightheadedness, weakness, seizures, numbness, tingling  Heme: No easy bruising or bleeding  Endo: No heat/cold intolerance  Psych: No significant nervousness, anxiety, stress, depression    All other systems were reviewed and are negative, except as noted.    VITALS/PHYSICAL EXAM  --------------------------------------------------------------------------------  T(C): 37.1 (20 @ 10:11), Max: 37.1 (20 @ 12:28)  HR: 85 (20 @ 10:11) (70 - 96)  BP: 116/74 (20 @ 10:11) (100/63 - 136/70)  RR: 18 (20 @ 10:11) (16 - 20)  SpO2: 96% (20 @ 10:11) (96% - 99%)  Wt(kg): --  Height (cm): 165.1 (20 @ 12:28)  Weight (kg): 94.3 (20 @ 12:28)  BMI (kg/m2): 34.6 (20 @ 12:28)  BSA (m2): 2.01 (20 @ 12:28)      Physical Exam:  	Gen: NAD, well-appearing  	HEENT: PERRL, supple neck, clear oropharynx  	Pulm: CTA B/L  	CV: RRR, S1S2; no rub  	Back: No spinal or CVA tenderness; no sacral edema  	Abd: +BS, soft, nontender/nondistended  	: No suprapubic tenderness  	UE: Warm, FROM, no clubbing, intact strength; no edema; no asterixis  	LE: Warm, FROM, no clubbing, intact strength; no edema  	Neuro: No focal deficits, intact gait  	Psych: Normal affect and mood  	Skin: Warm, without rashes  	Vascular access:    LABS/STUDIES  --------------------------------------------------------------------------------              10.5   11.46 >-----------<  269      [20 @ 13:00]              31.0     139  |  105  |  10.0  ----------------------------<  94      [20 @ 06:41]  3.1   |  21.0  |  0.46        Ca     12.7     [20 @ 06:41]      Mg     1.6     [20 @ 06:41]      Phos  1.8     [20 @ 06:41]    TPro  6.7  /  Alb  3.5  /  TBili  <0.2  /  DBili  x   /  AST  13  /  ALT  9   /  AlkPhos  103  [20 @ 14:06]          Creatinine Trend:  SCr 0.46 [ 06:41]  SCr 0.49 [ 14:06]    Urinalysis - [20 @ 22:15]      Color Yellow / Appearance Clear / SG 1.010 / pH 7.0      Gluc Negative / Ketone Negative  / Bili Negative / Urobili Negative       Blood Negative / Protein Negative / Leuk Est Small / Nitrite Negative      RBC Negative / WBC 0-2 / Hyaline  / Gran  / Sq Epi  / Non Sq Epi Occasional / Bacteria     Urine Sodium 55      [20 @ 22:13]  Urine Urea Nitrogen 118      [20 @ 00:58]  Urine Potassium 12      [20 @ 22:13]  Urine Chloride 48      [20 22:13]  Urine Phosphorus 9.3      [20 @ 00:58]    Iron 26, TIBC 420, %sat 6      [20 @ 18:11]  Ferritin 87      [20 @ 18:11]  PTH -- (Ca 12.9)      [20 @ 01:20]   76  Vitamin D (25OH) 13.1      [20 @ 01:20]  TSH 0.65      [20 @ 06:41] [facility] DIVISION OF KIDNEY DISEASES AND HYPERTENSION -- INITIAL CONSULT NOTE  --------------------------------------------------------------------------------  HPI:  33 y/o female pt ( currently 14 weeks pregnant )presented after a syncopal episode while at the grocery store. Pt reported feeling weak when standing in line, her vision faded slowly and she then woke up on the ground. Pt was tested positive for COVID-19 in march but repeat testing in ED was negative. Pt found to have hypokalemia, hypercalcemia and hypermagnesemia. Nephrology consulted for further evaluation.    Pt seen and examined at bedside. She is complaining of headache; denies any other complaint. Denies history of hypokalemia in the past. Denies nausea, vomiting, diarrhea, decreased po intake. Her only medication is prenatal vitamins. Reports history of low potassium in mother.       PAST HISTORY  --------------------------------------------------------------------------------  PAST MEDICAL & SURGICAL HISTORY:  No pertinent past medical history  S/P : x2    FAMILY HISTORY: FH of hypokalemia in mother    PAST SOCIAL HISTORY: ; works at a factory    ALLERGIES & MEDICATIONS  --------------------------------------------------------------------------------  Allergies    No Known Allergies    Intolerances      Standing Inpatient Medications  ascorbic acid 1000 milliGRAM(s) Oral daily  cyanocobalamin Injectable 1000 MICROGram(s) SubCutaneous daily  ergocalciferol 33142 Unit(s) Oral every week  ferrous    sulfate 325 milliGRAM(s) Oral daily  heparin   Injectable 5000 Unit(s) SubCutaneous every 8 hours  iron sucrose IVPB 200 milliGRAM(s) IV Intermittent every 24 hours  magnesium oxide 400 milliGRAM(s) Oral three times a day with meals  magnesium sulfate  IVPB 2 Gram(s) IV Intermittent every 1 hour  potassium phosphate / sodium phosphate powder 1 Packet(s) Oral three times a day  potassium phosphate IVPB 15 milliMole(s) IV Intermittent once  prenatal multivitamin 1 Tablet(s) Oral daily  sodium chloride 0.9% with potassium chloride 40 mEq/L 1000 milliLiter(s) IV Continuous <Continuous>    PRN Inpatient Medications  acetaminophen   Tablet .. 975 milliGRAM(s) Oral every 6 hours PRN  ondansetron Injectable 4 milliGRAM(s) IV Push every 6 hours PRN      REVIEW OF SYSTEMS  --------------------------------------------------------------------------------  Gen: No weight changes, fatigue, fevers/chills, weakness  Skin: No rashes  Head/Eyes/Ears/Mouth:  headache+; Normal hearing; Normal vision w/o blurriness; No sinus pain/discomfort, sore throat  Respiratory: No dyspnea, cough, wheezing, hemoptysis  CV: No chest pain, PND, orthopnea  GI: No abdominal pain, diarrhea, constipation, nausea, vomiting, melena, hematochezia  : No increased frequency, dysuria, hematuria, nocturia  MSK: No joint pain/swelling; no back pain; no edema  Neuro: No dizziness/lightheadedness, weakness, seizures, numbness, tingling  Heme: No easy bruising or bleeding  Endo: No heat/cold intolerance  Psych: No significant nervousness, anxiety, stress, depression    All other systems were reviewed and are negative, except as noted.    VITALS/PHYSICAL EXAM  --------------------------------------------------------------------------------  T(C): 37.1 (20 @ 10:11), Max: 37.1 (20 @ 12:28)  HR: 85 (04-29-20 @ 10:11) (70 - 96)  BP: 116/74 (20 10:11) (100/63 - 136/70)  RR: 18 (20 10:11) (16 - 20)  SpO2: 96% (20 10:11) (96% - 99%)  Wt(kg): --  Height (cm): 165.1 (20 @ 12:28)  Weight (kg): 94.3 (20 @ 12:28)  BMI (kg/m2): 34.6 (20 @ 12:28)  BSA (m2): 2.01 (20 12:28)      Physical Exam:  	Gen: NAD, well-appearing  	HEENT: PERRL, supple neck  	Pulm: CTA B/L  	CV: RRR, S1S2; no rub  	Back: No spinal or CVA tenderness; no sacral edema  	Abd: +BS, soft, nontender/gravid abdomen+  	: No suprapubic tenderness  	UE: Warm, no edema; no asterixis  	LE: Warm,  no edema  	Neuro: No focal deficits  	Psych: Normal affect and mood  	Skin: Warm      LABS/STUDIES  --------------------------------------------------------------------------------              10.5   11.46 >-----------<  269      [20 @ 13:00]              31.0     139  |  105  |  10.0  ----------------------------<  94      [20 @ 06:41]  3.1   |  21.0  |  0.46        Ca     12.7     [20 @ 06:41]      Mg     1.6     [20 06:41]      Phos  1.8     [20:41]    TPro  6.7  /  Alb  3.5  /  TBili  <0.2  /  DBili  x   /  AST  13  /  ALT  9   /  AlkPhos  103  [20 14:06]          Creatinine Trend:  SCr 0.46 [:41]  SCr 0.49 [ 14:06]    Urinalysis - [20 22:15]      Color Yellow / Appearance Clear / SG 1.010 / pH 7.0      Gluc Negative / Ketone Negative  / Bili Negative / Urobili Negative       Blood Negative / Protein Negative / Leuk Est Small / Nitrite Negative      RBC Negative / WBC 0-2 / Hyaline  / Gran  / Sq Epi  / Non Sq Epi Occasional / Bacteria     Urine Sodium 55      [20 22:13]  Urine Urea Nitrogen 118      [20 @ 00:58]  Urine Potassium 12      [20 22:13]  Urine Chloride 48      [20 22:13]  Urine Phosphorus 9.3      [20 00:58]    Iron 26, TIBC 420, %sat 6      [20 18:11]  Ferritin 87      [20 18:11]  PTH -- (Ca 12.9)      [20 01:20]   76  Vitamin D (25OH) 13.1      [20 01:20]  TSH 0.65      [20 06:41] [facility] DIVISION OF KIDNEY DISEASES AND HYPERTENSION -- INITIAL CONSULT NOTE  --------------------------------------------------------------------------------  HPI:  33 y/o female pt ( currently 14 weeks pregnant )presented after a syncopal episode while at the grocery store. Pt reported feeling weak when standing in line, her vision faded slowly and she then woke up on the ground. Pt was tested positive for COVID-19 in march but repeat testing in ED was negative. Pt found to have hypokalemia, hypercalcemia and hypermagnesemia. Nephrology consulted for further evaluation.    Pt seen and examined at bedside. She is complaining of headache; denies any other complaint. Denies history of hypokalemia in the past. Denies nausea, vomiting, diarrhea, decreased po intake. Reports she was feeling weak for the past few weeks. Her only medication is prenatal vitamins. Reports history of low potassium in mother.       PAST HISTORY  --------------------------------------------------------------------------------  PAST MEDICAL & SURGICAL HISTORY:  No pertinent past medical history  S/P : x2    FAMILY HISTORY: FH of hypokalemia in mother    PAST SOCIAL HISTORY: ; works at a factory    ALLERGIES & MEDICATIONS  --------------------------------------------------------------------------------  Allergies    No Known Allergies    Intolerances      Standing Inpatient Medications  ascorbic acid 1000 milliGRAM(s) Oral daily  cyanocobalamin Injectable 1000 MICROGram(s) SubCutaneous daily  ergocalciferol 07317 Unit(s) Oral every week  ferrous    sulfate 325 milliGRAM(s) Oral daily  heparin   Injectable 5000 Unit(s) SubCutaneous every 8 hours  iron sucrose IVPB 200 milliGRAM(s) IV Intermittent every 24 hours  magnesium oxide 400 milliGRAM(s) Oral three times a day with meals  magnesium sulfate  IVPB 2 Gram(s) IV Intermittent every 1 hour  potassium phosphate / sodium phosphate powder 1 Packet(s) Oral three times a day  potassium phosphate IVPB 15 milliMole(s) IV Intermittent once  prenatal multivitamin 1 Tablet(s) Oral daily  sodium chloride 0.9% with potassium chloride 40 mEq/L 1000 milliLiter(s) IV Continuous <Continuous>    PRN Inpatient Medications  acetaminophen   Tablet .. 975 milliGRAM(s) Oral every 6 hours PRN  ondansetron Injectable 4 milliGRAM(s) IV Push every 6 hours PRN      REVIEW OF SYSTEMS  --------------------------------------------------------------------------------  Gen: No weight changes, fatigue, fevers/chills, weakness  Skin: No rashes  Head/Eyes/Ears/Mouth:  headache+; Normal hearing; Normal vision w/o blurriness; No sinus pain/discomfort, sore throat  Respiratory: No dyspnea, cough, wheezing, hemoptysis  CV: No chest pain, PND, orthopnea  GI: No abdominal pain, diarrhea, constipation, nausea, vomiting, melena, hematochezia  : No increased frequency, dysuria, hematuria, nocturia  MSK: No joint pain/swelling; no back pain; no edema  Neuro: No dizziness/lightheadedness, weakness, seizures, numbness, tingling  Heme: No easy bruising or bleeding  Endo: No heat/cold intolerance  Psych: No significant nervousness, anxiety, stress, depression    All other systems were reviewed and are negative, except as noted.    VITALS/PHYSICAL EXAM  --------------------------------------------------------------------------------  T(C): 37.1 (20 @ 10:11), Max: 37.1 (20 @ 12:28)  HR: 85 (20 10:11) (70 - 96)  BP: 116/74 (20 10:11) (100/63 - 136/70)  RR: 18 (20 10:11) (16 - 20)  SpO2: 96% (20 10:11) (96% - 99%)  Wt(kg): --  Height (cm): 165.1 (20 @ 12:28)  Weight (kg): 94.3 (20 @ 12:28)  BMI (kg/m2): 34.6 (20 12:28)  BSA (m2): 2.01 (20 @ 12:28)      Physical Exam:  	Gen: NAD, well-appearing  	HEENT: PERRL, supple neck  	Pulm: CTA B/L  	CV: RRR, S1S2; no rub  	Back: No spinal or CVA tenderness; no sacral edema  	Abd: +BS, soft, nontender/gravid abdomen+  	: No suprapubic tenderness  	UE: Warm, no edema; no asterixis  	LE: Warm,  no edema  	Neuro: No focal deficits  	Psych: Normal affect and mood  	Skin: Warm      LABS/STUDIES  --------------------------------------------------------------------------------              10.5   11.46 >-----------<  269      [20 @ 13:00]              31.0     139  |  105  |  10.0  ----------------------------<  94      [20 06:41]  3.1   |  21.0  |  0.46        Ca     12.7     [20 06:41]      Mg     1.6     [20 06:41]      Phos  1.8     [20 06:41]    TPro  6.7  /  Alb  3.5  /  TBili  <0.2  /  DBili  x   /  AST  13  /  ALT  9   /  AlkPhos  103  [20 14:06]          Creatinine Trend:  SCr 0.46 [:41]  SCr 0.49 [ 14:06]    Urinalysis - [20 22:15]      Color Yellow / Appearance Clear / SG 1.010 / pH 7.0      Gluc Negative / Ketone Negative  / Bili Negative / Urobili Negative       Blood Negative / Protein Negative / Leuk Est Small / Nitrite Negative      RBC Negative / WBC 0-2 / Hyaline  / Gran  / Sq Epi  / Non Sq Epi Occasional / Bacteria     Urine Sodium 55      [20 @ 22:13]  Urine Urea Nitrogen 118      [20 @ 00:58]  Urine Potassium 12      [20 @ 22:13]  Urine Chloride 48      [20 @ 22:13]  Urine Phosphorus 9.3      [20 @ 00:58]    Iron 26, TIBC 420, %sat 6      [20 @ 18:11]  Ferritin 87      [20 18:11]  PTH -- (Ca 12.9)      [20 @ 01:20]   76  Vitamin D (25OH) 13.1      [20 01:20]  TSH 0.65      [20 06:41]

## 2020-04-29 NOTE — CONSULT NOTE ADULT - ASSESSMENT
34 year old pregnant woman with syncopal episode.  Found to have multiple electrolyte abnormalities including hypokalemia, hypercalcemia and hypomagnesemia. 34 year old pregnant woman with syncopal episode.  Found to have multiple electrolyte abnormalities including hypokalemia, hypercalcemia and hypomagnesemia.  likely Gitelman's syndrome;  given positive family history  Continue potassium, phos and mag repletion 34 year old pregnant woman with syncopal episode.  Found to have multiple electrolyte abnormalities including hypokalemia, hypercalcemia and hypomagnesemia.  likely Gitelman's syndrome;  given positive family history  Pt however is acidotic and also with low funmi; which goes against the diagnosis  Continue potassium, phos and mag repletion  Will get 24-hour urine K+ collection  Hold off on Vitamin D supplementation given hypercalcemia.  Echo to r/o structural heart disease given syncope; did she have an arrhythmia with the electrolyte abnormalities?  Monitor lisseth, UOP.  Discussed with Dr. Parker

## 2020-04-30 LAB
ALBUMIN SERPL ELPH-MCNC: 3.4 G/DL — SIGNIFICANT CHANGE UP (ref 3.3–5.2)
ALP SERPL-CCNC: 94 U/L — SIGNIFICANT CHANGE UP (ref 40–120)
ALT FLD-CCNC: 9 U/L — SIGNIFICANT CHANGE UP
ANION GAP SERPL CALC-SCNC: 16 MMOL/L — SIGNIFICANT CHANGE UP (ref 5–17)
AST SERPL-CCNC: 12 U/L — SIGNIFICANT CHANGE UP
BASOPHILS # BLD AUTO: 0.03 K/UL — SIGNIFICANT CHANGE UP (ref 0–0.2)
BASOPHILS NFR BLD AUTO: 0.4 % — SIGNIFICANT CHANGE UP (ref 0–2)
BILIRUB DIRECT SERPL-MCNC: 0.1 MG/DL — SIGNIFICANT CHANGE UP (ref 0–0.3)
BILIRUB INDIRECT FLD-MCNC: <0.1 MG/DL — LOW (ref 0.2–1)
BILIRUB SERPL-MCNC: <0.2 MG/DL — LOW (ref 0.4–2)
BUN SERPL-MCNC: 6 MG/DL — LOW (ref 8–20)
CALCIUM SERPL-MCNC: 13 MG/DL — CRITICAL HIGH (ref 8.6–10.2)
CHLORIDE SERPL-SCNC: 104 MMOL/L — SIGNIFICANT CHANGE UP (ref 98–107)
CO2 SERPL-SCNC: 20 MMOL/L — LOW (ref 22–29)
CREAT SERPL-MCNC: 0.47 MG/DL — LOW (ref 0.5–1.3)
D DIMER BLD IA.RAPID-MCNC: 155 NG/ML DDU — SIGNIFICANT CHANGE UP
EOSINOPHIL # BLD AUTO: 0.08 K/UL — SIGNIFICANT CHANGE UP (ref 0–0.5)
EOSINOPHIL NFR BLD AUTO: 1 % — SIGNIFICANT CHANGE UP (ref 0–6)
FOLATE SERPL-MCNC: >20 NG/ML — SIGNIFICANT CHANGE UP
GLUCOSE SERPL-MCNC: 121 MG/DL — HIGH (ref 70–99)
HCT VFR BLD CALC: 31.7 % — LOW (ref 34.5–45)
HGB BLD-MCNC: 10.4 G/DL — LOW (ref 11.5–15.5)
IMM GRANULOCYTES NFR BLD AUTO: 0.4 % — SIGNIFICANT CHANGE UP (ref 0–1.5)
LYMPHOCYTES # BLD AUTO: 1.84 K/UL — SIGNIFICANT CHANGE UP (ref 1–3.3)
LYMPHOCYTES # BLD AUTO: 22.6 % — SIGNIFICANT CHANGE UP (ref 13–44)
MAGNESIUM SERPL-MCNC: 1.4 MG/DL — LOW (ref 1.8–2.6)
MCHC RBC-ENTMCNC: 27.5 PG — SIGNIFICANT CHANGE UP (ref 27–34)
MCHC RBC-ENTMCNC: 32.8 GM/DL — SIGNIFICANT CHANGE UP (ref 32–36)
MCV RBC AUTO: 83.9 FL — SIGNIFICANT CHANGE UP (ref 80–100)
MONOCYTES # BLD AUTO: 0.47 K/UL — SIGNIFICANT CHANGE UP (ref 0–0.9)
MONOCYTES NFR BLD AUTO: 5.8 % — SIGNIFICANT CHANGE UP (ref 2–14)
NEUTROPHILS # BLD AUTO: 5.69 K/UL — SIGNIFICANT CHANGE UP (ref 1.8–7.4)
NEUTROPHILS NFR BLD AUTO: 69.8 % — SIGNIFICANT CHANGE UP (ref 43–77)
PHOSPHATE SERPL-MCNC: 2 MG/DL — LOW (ref 2.4–4.7)
PLATELET # BLD AUTO: 256 K/UL — SIGNIFICANT CHANGE UP (ref 150–400)
POTASSIUM SERPL-MCNC: 3.2 MMOL/L — LOW (ref 3.5–5.3)
POTASSIUM SERPL-SCNC: 3.2 MMOL/L — LOW (ref 3.5–5.3)
PROT SERPL-MCNC: 6.6 G/DL — SIGNIFICANT CHANGE UP (ref 6.6–8.7)
RBC # BLD: 3.78 M/UL — LOW (ref 3.8–5.2)
RBC # FLD: 14.4 % — SIGNIFICANT CHANGE UP (ref 10.3–14.5)
SODIUM SERPL-SCNC: 140 MMOL/L — SIGNIFICANT CHANGE UP (ref 135–145)
WBC # BLD: 8.14 K/UL — SIGNIFICANT CHANGE UP (ref 3.8–10.5)
WBC # FLD AUTO: 8.14 K/UL — SIGNIFICANT CHANGE UP (ref 3.8–10.5)

## 2020-04-30 PROCEDURE — 99233 SBSQ HOSP IP/OBS HIGH 50: CPT

## 2020-04-30 PROCEDURE — 99232 SBSQ HOSP IP/OBS MODERATE 35: CPT

## 2020-04-30 PROCEDURE — 93970 EXTREMITY STUDY: CPT | Mod: 26

## 2020-04-30 RX ORDER — POTASSIUM CHLORIDE 20 MEQ
10 PACKET (EA) ORAL
Refills: 0 | Status: COMPLETED | OUTPATIENT
Start: 2020-04-30 | End: 2020-04-30

## 2020-04-30 RX ORDER — POTASSIUM CHLORIDE 20 MEQ
40 PACKET (EA) ORAL EVERY 4 HOURS
Refills: 0 | Status: COMPLETED | OUTPATIENT
Start: 2020-04-30 | End: 2020-04-30

## 2020-04-30 RX ADMIN — PREGABALIN 1000 MICROGRAM(S): 225 CAPSULE ORAL at 13:14

## 2020-04-30 RX ADMIN — Medication 40 MILLIEQUIVALENT(S): at 15:01

## 2020-04-30 RX ADMIN — Medication 325 MILLIGRAM(S): at 13:14

## 2020-04-30 RX ADMIN — HEPARIN SODIUM 5000 UNIT(S): 5000 INJECTION INTRAVENOUS; SUBCUTANEOUS at 21:33

## 2020-04-30 RX ADMIN — Medication 1 PACKET(S): at 13:14

## 2020-04-30 RX ADMIN — Medication 40 MILLIEQUIVALENT(S): at 21:55

## 2020-04-30 RX ADMIN — HEPARIN SODIUM 5000 UNIT(S): 5000 INJECTION INTRAVENOUS; SUBCUTANEOUS at 13:14

## 2020-04-30 RX ADMIN — Medication 1 TABLET(S): at 13:14

## 2020-04-30 RX ADMIN — MAGNESIUM OXIDE 400 MG ORAL TABLET 400 MILLIGRAM(S): 241.3 TABLET ORAL at 17:23

## 2020-04-30 RX ADMIN — MAGNESIUM OXIDE 400 MG ORAL TABLET 400 MILLIGRAM(S): 241.3 TABLET ORAL at 08:33

## 2020-04-30 RX ADMIN — Medication 100 MILLIEQUIVALENT(S): at 17:22

## 2020-04-30 RX ADMIN — MAGNESIUM OXIDE 400 MG ORAL TABLET 400 MILLIGRAM(S): 241.3 TABLET ORAL at 13:14

## 2020-04-30 RX ADMIN — Medication 975 MILLIGRAM(S): at 09:59

## 2020-04-30 RX ADMIN — Medication 100 MILLIEQUIVALENT(S): at 15:05

## 2020-04-30 RX ADMIN — Medication 100 MILLIEQUIVALENT(S): at 15:00

## 2020-04-30 RX ADMIN — Medication 1 PACKET(S): at 05:59

## 2020-04-30 RX ADMIN — HEPARIN SODIUM 5000 UNIT(S): 5000 INJECTION INTRAVENOUS; SUBCUTANEOUS at 05:59

## 2020-04-30 RX ADMIN — Medication 1000 MILLIGRAM(S): at 13:14

## 2020-04-30 RX ADMIN — IRON SUCROSE 110 MILLIGRAM(S): 20 INJECTION, SOLUTION INTRAVENOUS at 13:13

## 2020-04-30 RX ADMIN — DEXTROSE MONOHYDRATE, SODIUM CHLORIDE, AND POTASSIUM CHLORIDE 100 MILLILITER(S): 50; .745; 4.5 INJECTION, SOLUTION INTRAVENOUS at 15:01

## 2020-04-30 RX ADMIN — DEXTROSE MONOHYDRATE, SODIUM CHLORIDE, AND POTASSIUM CHLORIDE 100 MILLILITER(S): 50; .745; 4.5 INJECTION, SOLUTION INTRAVENOUS at 17:23

## 2020-04-30 RX ADMIN — DEXTROSE MONOHYDRATE, SODIUM CHLORIDE, AND POTASSIUM CHLORIDE 100 MILLILITER(S): 50; .745; 4.5 INJECTION, SOLUTION INTRAVENOUS at 00:14

## 2020-04-30 NOTE — PROGRESS NOTE ADULT - ASSESSMENT
33 yo F 14 weeks pregnant ( per her GYN attending ) , reported recent COVID positive test admittted for syncopal episode , weakness found to have K 2.7 , hypomagnesemia and hypercalcemia , reported recent covid 19 positive infection     1- Syncope likely due to electrolyte imbalance   no recurrent events   cardiology consulted   TTE no structural abnormalities   liekly will need events monitor after discharge follwo up with Dr Renner     cont cardiac monitor   will get doppler screen for VTE   d dimers ordered     2- Hypokalemia / hypomagnesemia / hypophosphatemia   cont to replace lytes to keep K 4 and over , mg over 1.8/2   renal on board , will discuss re high calcium   cont iv fluid   24 hours urine collection for K ordered     4- Anemia of iron deficiency and vit b12 low   add ferrous supplement   vit b 12 injection x2 days then po vit b 12     5-vit D deficiency   no vit d supplement , will avoid supplement due to high calcium     6- Recent COVID infection positive 3/26 per her gyn attending outpatient records   repeat neg on 4/22 and 4/29   7- 14 weeks pregnancy   Dr Pepe called yesterday with him over the phone   he does not rec any gyn testing [facility] at present   they do not come to [facility] in patient due to COVID rules regulations he states   call as needed

## 2020-04-30 NOTE — PROGRESS NOTE ADULT - SUBJECTIVE AND OBJECTIVE BOX
[facility]                                                               Office: [address] [address]                                                              Telephone: [phone]. Fax:[phone]                                                                             PROGRESS NOTE  Reason for follow up: syncope   Update: telemetry- SR, frequent PVC. denies chest pain shortness of breath, dizziness. States does feel occasional palpitation.     	  Vitals:  T(C): 36.8 (04-30-20 @ 07:55), Max: 37.1 (04-29-20 @ 19:38)  HR: 82 (04-30-20 @ 07:55) (74 - 82)  BP: 118/77 (04-30-20 @ 07:55) (96/55 - 118/77)  RR: 18 (04-30-20 @ 07:55) (18 - 18)  SpO2: 93% (04-30-20 @ 07:55) (93% - 98%)      I&O's Summary    29 Apr 2020 07:01  -  30 Apr 2020 07:00  --------------------------------------------------------  IN: 1200 mL / OUT: 0 mL / NET: 1200 mL      Weight (kg): 94.3 (04-28 @ 12:28)      PHYSICAL EXAM:  Appearance: Comfortable. No acute distress  HEENT:  Head and neck: Atraumatic. Normocephalic.  Normal oral mucosa, PERRL, Neck is supple.  Neurologic: A & O x 3, no focal deficits. EOMI.  Cardiovascular: Normal S1 S2, No murmur, rubs/gallops. No JVD, No edema  Respiratory: Lungs clear to auscultation  Gastrointestinal:  Soft, Non-tender, + BS  Lower Extremities: No edema  Psychiatry: Patient is calm. No agitation. Mood & affect appropriate  Skin: No rashes/ ecchymoses/cyanosis/ulcers visualized on the face, hands or feet.      CURRENT MEDICATIONS:    ascorbic acid  cyanocobalamin Injectable  ferrous    sulfate  heparin   Injectable  iron sucrose IVPB  magnesium oxide  potassium chloride    Tablet ER  potassium chloride  10 mEq/100 mL IVPB  potassium phosphate / sodium phosphate powder  prenatal multivitamin  sodium chloride 0.9% with potassium chloride 40 mEq/L      DIAGNOSTIC TESTING:  [ ] Echocardiogram:   < from: TTE Echo Complete w/ Contrast w/ Doppler (04.29.20 @ 16:26) >  Summary:   1. Technically good study.   2. Normal global left ventricular systolic function.   3. Left ventricular ejection fraction, by visual estimation, is 55 to 60%.   4. Normal right ventricular size and function.   5. Trace mitral valve regurgitation.   6. There is no evidence of pericardial effusion.    MD Melissa Electronically signed on 4/29/2020 at 5:09:35 PM    < end of copied text >  :    OTHER: 	      LABS:	                         10.4   8.14  )-----------( 256      ( 30 Apr 2020 10:10 )             31.7     04-30    140  |  104  |  6.0<L>  ----------------------------<  121<H>  3.2<L>   |  20.0<L>  |  0.47<L>    Ca    13.0<HH>      30 Apr 2020 10:10  Phos  2.0     04-30  Mg     1.4     04-30    TPro  6.6  /  Alb  3.4  /  TBili  <0.2<L>  /  DBili  0.1  /  AST  12  /  ALT  9   /  AlkPhos  94  04-30      TSH: Thyroid Stimulating Hormone, Serum: 0.65 uIU/mL  Thyroid Stimulating Hormone, Serum: 0.38 uIU/mL    TELEMETRY: SR, PVCs

## 2020-04-30 NOTE — PROGRESS NOTE ADULT - ASSESSMENT
33 y/o F  currently 14 weeks pregnant with no PMHx who presented to the ED after a syncopal episode. Patient was feeling well after recovering from Covid a month ago, but yesterday she was at the grocery store when suddenly she saw black, and woke up on the ground. Patient was found upon arrival to have hypokalemia, hypomagnesia, and hypercalcemia. Patient is being followed by Nephrology who is working her up for possible Gitelman's Syndrome. PVCs on EKG and telemetry, and had Mobitz I.       1. Syncope  - Concern for arrhythmia secondary to severe electrolyte disturbances  - Keep K>4, Mg>2 if able  - TTE- EF 55-60%, unremarkable   - Continue telemetry monitoring.   - Will set up for 21 day MCOT monitor as an outpatient.    2. Electrolyte Disturbances  - Possible Gitelman's Syndrome  - Renal following  - Management per the primary team.     3. Pregnancy  - Management per primary team.     Preliminary evaluation, please await official recommendations by Dr. Renner    Will sign off. Please have pt follow up in office after discharge for MCOT monitor as out pt.

## 2020-04-30 NOTE — PROGRESS NOTE ADULT - ASSESSMENT
1. Syncope  2. Multiple electrolyte imbalances  3. Hyperparathyroidism    34 year old pregnant woman with syncopal episode.  Multiple electrolyte abnormalities including hypokalemia, hypercalcemia and hypomagnesemia.  ? Gitelman's syndrome;  given positive family history  Pt however is acidotic and also with low funmi; which goes against the diagnosis  Continue potassium, phos and mag repletion  24-hour urine K+ collection  Hold off on Vitamin D supplementation given hypercalcemia.  PARAG completed and reviewed  Monitor lytes, UOP  Needs parathyroid scan 1. Syncope  2. Multiple electrolyte imbalances  3. Hyperparathyroidism    34 year old pregnant woman (; 14 weeks gestation)  with syncopal episode.  Multiple electrolyte abnormalities including hypokalemia, hypercalcemia and hypomagnesemia.  ? Gitelman's syndrome;   given positive family history- exacerbated in pregnancy by increasing fetal demand  Pt however is acidotic and also with low funim; which goes against the diagnosis-- ? RTA/ Diarrhea?   Will send complete set of urine lytes  Repeat funmi levels and plasma renin activity    Also with elevated iPTH, low phos and low Vitamin D- ? PHPT with co-exisitng vitamin D def vs SHPT (however Ca++ levels are high)   Will get 24 hour urinary calcium collection  Send FLC, SPEP, PTHrP, 1-25 (OH)2 vitamin D levels    Hold off on Vitamin D supplementation given hypercalcemia  Also obtain 24-h urine K+ and funmi levels  Continue potassium, phos and mag repletion  Reached out to PCP Dr. Riggs to obtain past records  High risk Ob consult  She will likely need aggressive management of electrolytes as pregnancy proceeds.     Discussed with Dr. Sarkar 1. Syncope  2. Multiple electrolyte imbalances  3. Hyperparathyroidism    34 year old pregnant woman (; 14 weeks gestation)  with syncopal episode.  Multiple electrolyte abnormalities including hypokalemia, hypercalcemia and hypomagnesemia.  ? Gitelman's syndrome;   given positive family history- exacerbated in pregnancy by increasing fetal demand  Pt however is acidotic and also with low funmi; which goes against the diagnosis--   Positive UAG- pt however refuses diarrhea   Will repeat complete set of urine lytes  Repeat funmi levels and plasma renin activity    Also with elevated iPTH, low phos and low Vitamin D- ? PHPT with co-exisitng vitamin D def vs SHPT (however Ca++ levels are high)   Will get 24 hour urinary calcium collection  Send FLC, SPEP, PTHrP, 1-25 (OH)2 vitamin D levels    Hold off on Vitamin D supplementation given hypercalcemia  Also obtain 24-h urine K+ and funmi levels  Continue potassium, phos and mag repletion  Reached out to PCP Dr. Riggs to obtain past records  High risk Ob consult  She will likely need aggressive management of electrolytes as pregnancy proceeds.     Discussed with Dr. Sarkar 1. Syncope  2. Multiple electrolyte imbalances  3. Hyperparathyroidism    34 year old pregnant woman (; 14 weeks gestation)  with syncopal episode.  Multiple electrolyte abnormalities including hypokalemia, hypercalcemia and hypomagnesemia.  ? Gitelman's syndrome;   given positive family history- exacerbated in pregnancy by increasing fetal demand  Pt however is acidotic and also with low funmi; which goes against the diagnosis--   Positive UAG- RTA?   Will repeat complete set of urine lytes  Repeat funmi levels and plasma renin activity    Also with elevated iPTH, low phos and low Vitamin D- ? PHPT with co-exisitng vitamin D def vs SHPT (however Ca++ levels are high)   Will get 24 hour urinary calcium collection  Send FLC, SPEP, PTHrP, 1-25 (OH)2 vitamin D levels    Hold off on Vitamin D supplementation given hypercalcemia  Also obtain 24-h urine K+ and funmi levels  Continue potassium, phos and mag repletion  Reached out to PCP Dr. Riggs to obtain past records  High risk Ob consult  She will likely need aggressive management of electrolytes as pregnancy proceeds.     Discussed with Dr. Sarkar

## 2020-04-30 NOTE — CHART NOTE - NSCHARTNOTEFT_GEN_A_CORE
Primary team would like for MFM team to be aware of patient and to help facilitate care for the patient after discharge.  Patient seen in [facility] this afternoon.  Patient is a 35yo  @ approximately 14+w who presented with a syncopal episode and found to be hypokalemic, hypomagnesia, and hypercalcemic. Patient is being worked up for possible Gittelman's syndrome.   At this time, patient pregnancy is early and does not require any obstetrical interventions at this time.  Patient explained the process for M care during her pregnancy. Her primary obgyn must refer this patient to MFM to be seen in the office. Recommended that the patient be seen within a week of discharge by her primary OBGYN (Dr. Pepe). Patient understood. Had no further questions.  Office information printed and given to the nurse to be given to patient.

## 2020-04-30 NOTE — PROGRESS NOTE ADULT - SUBJECTIVE AND OBJECTIVE BOX
Patient is a 34y old  Female who presents with a chief complaint of syncope  seen by Citizen of [place] speaking nurse who is taking care ogf her today   she is with mild headache , denies any chest pain , no cough , dizziness better , denies leg pain   she reports  feeling week since she got corona infection last months         Vital Signs Last 24 Hrs  T(C): 36.8 (2020 07:55), Max: 37.1 (2020 19:38)  T(F): 98.2 (2020 07:55), Max: 98.8 (2020 19:38)  HR: 82 (2020 07:55) (74 - 82)  BP: 118/77 (2020 07:55) (96/55 - 118/77)  BP(mean): --  RR: 18 (2020 07:55) (18 - 18)  SpO2: 93% (2020 07:55) (93% - 98%)  PHYSICAL EXAM:    GENERAL: NAD, well-groomed, well-developed  EYES: conjunctiva and sclera clear  NERVOUS SYSTEM:  Alert & Oriented X3, Good concentration; no motor deficits   CHEST/LUNG: Clear to auscultation bilaterally; No rales, rhonchi  HEART: Regular rate and rhythm; S1 /s2   ABDOMEN: Soft, Nontender, Nondistended; Bowel sounds present  EXTREMITIES:  No clubbing, cyanosis, or edema    < from: TTE Echo Complete w/ Contrast w/ Doppler (20 @ 16:26) >  ummary:   1. Technically good study.   2. Normal global left ventricular systolic function.   3. Left ventricular ejection fraction, by visual estimation, is 55 to 60%.  < from: 12 Lead ECG (20 @ 12:42) >  Diagnosis Line *** Poor data quality, interpretation may be adversely affected  Sinus rhythm with 1st degree A-V block with occasional Premature ventricular complexes  Otherwise normal ECG      < end of copied text >   4. Normal right ventricular size and function.   5. Trace mitral valve regurgitation.   6. There is no evidence of pericardial effusion.      < end of copied text >      LABS:                        10.5   11.46 )-----------( 269      ( 2020 13:00 )             31.0     04-    139  |  105  |  10.0  ----------------------------<  94  3.1<L>   |  21.0<L>  |  0.46<L>    Ca    12.7<H>      2020 06:41  Phos  1.8       Mg     1.6         TPro  6.7  /  Alb  3.5  /  TBili  <0.2<L>  /  DBili  x   /  AST  13  /  ALT  9   /  AlkPhos  103    Vitamin D, 25-Hydroxy: 13.1: VITD Interpretive Data Result:  30.0-80.0 ng/mL Optimal levels (Reference Range)      Vitamin B12, Serum: 274 pg/mL (20 @ 18:11)        Urinalysis Basic - ( 2020 22:15 )    Color: Yellow / Appearance: Clear / S.010 / pH: x  Gluc: x / Ketone: Negative  / Bili: Negative / Urobili: Negative mg/dL   Blood: x / Protein: Negative mg/dL / Nitrite: Negative   Leuk Esterase: Small / RBC: Negative /HPF / WBC 0-2   Sq Epi: x / Non Sq Epi: Occasional / Bacteria: x        RADIOLOGY & ADDITIONAL TESTS:

## 2020-05-01 LAB
% ALBUMIN: 48 % — SIGNIFICANT CHANGE UP
% ALPHA 1: 6.2 % — SIGNIFICANT CHANGE UP
% ALPHA 2: 14.3 % — SIGNIFICANT CHANGE UP
% BETA: 15 % — SIGNIFICANT CHANGE UP
% GAMMA: 16.5 % — SIGNIFICANT CHANGE UP
ALBUMIN SERPL ELPH-MCNC: 3 G/DL — LOW (ref 3.6–5.5)
ALBUMIN/GLOB SERPL ELPH: 0.9 RATIO — SIGNIFICANT CHANGE UP
ALDOST SERPL-MCNC: 3.4 NG/DL — SIGNIFICANT CHANGE UP
ALPHA1 GLOB SERPL ELPH-MCNC: 0.4 G/DL — SIGNIFICANT CHANGE UP (ref 0.1–0.4)
ALPHA2 GLOB SERPL ELPH-MCNC: 0.9 G/DL — SIGNIFICANT CHANGE UP (ref 0.5–1)
B-GLOBULIN SERPL ELPH-MCNC: 0.9 G/DL — SIGNIFICANT CHANGE UP (ref 0.5–1)
COLLECT DURATION TIME UR: 24 HR — SIGNIFICANT CHANGE UP
GAMMA GLOBULIN: 1 G/DL — SIGNIFICANT CHANGE UP (ref 0.6–1.6)
KAPPA LC SER QL IFE: 1.41 MG/DL — SIGNIFICANT CHANGE UP (ref 0.33–1.94)
KAPPA/LAMBDA FREE LIGHT CHAIN RATIO, SERUM: 0.73 RATIO — SIGNIFICANT CHANGE UP (ref 0.26–1.65)
LAMBDA LC SER QL IFE: 1.92 MG/DL — SIGNIFICANT CHANGE UP (ref 0.57–2.63)
POTASSIUM 24H UR-MRATE: 130 MMOL/24HR — HIGH (ref 25–125)
PROT 24H UR-MRATE: 364 MG/24HR — HIGH (ref 50–100)
PROT PATTERN SERPL ELPH-IMP: SIGNIFICANT CHANGE UP
PROT SERPL-MCNC: 6.2 G/DL — SIGNIFICANT CHANGE UP (ref 6–8.3)
PROT SERPL-MCNC: 6.2 G/DL — SIGNIFICANT CHANGE UP (ref 6–8.3)
TOTAL VOLUME - 24 HOUR: 5200 ML — SIGNIFICANT CHANGE UP
URINE CREATININE CALCULATION: 1.6 G/24 HR — SIGNIFICANT CHANGE UP (ref 0.8–1.8)
VIT D25+D1,25 OH+D1,25 PNL SERPL-MCNC: 117 PG/ML — HIGH (ref 19.9–79.3)

## 2020-05-01 PROCEDURE — 99233 SBSQ HOSP IP/OBS HIGH 50: CPT

## 2020-05-01 PROCEDURE — 71045 X-RAY EXAM CHEST 1 VIEW: CPT | Mod: 26

## 2020-05-01 PROCEDURE — 76536 US EXAM OF HEAD AND NECK: CPT | Mod: 26

## 2020-05-01 RX ORDER — MAGNESIUM SULFATE 500 MG/ML
2 VIAL (ML) INJECTION ONCE
Refills: 0 | Status: COMPLETED | OUTPATIENT
Start: 2020-05-01 | End: 2020-05-01

## 2020-05-01 RX ORDER — POLYETHYLENE GLYCOL 3350 17 G/17G
17 POWDER, FOR SOLUTION ORAL DAILY
Refills: 0 | Status: DISCONTINUED | OUTPATIENT
Start: 2020-05-01 | End: 2020-05-07

## 2020-05-01 RX ADMIN — Medication 1 TABLET(S): at 12:12

## 2020-05-01 RX ADMIN — DEXTROSE MONOHYDRATE, SODIUM CHLORIDE, AND POTASSIUM CHLORIDE 100 MILLILITER(S): 50; .745; 4.5 INJECTION, SOLUTION INTRAVENOUS at 05:16

## 2020-05-01 RX ADMIN — HEPARIN SODIUM 5000 UNIT(S): 5000 INJECTION INTRAVENOUS; SUBCUTANEOUS at 12:14

## 2020-05-01 RX ADMIN — Medication 975 MILLIGRAM(S): at 21:13

## 2020-05-01 RX ADMIN — MAGNESIUM OXIDE 400 MG ORAL TABLET 400 MILLIGRAM(S): 241.3 TABLET ORAL at 08:15

## 2020-05-01 RX ADMIN — HEPARIN SODIUM 5000 UNIT(S): 5000 INJECTION INTRAVENOUS; SUBCUTANEOUS at 05:17

## 2020-05-01 RX ADMIN — POLYETHYLENE GLYCOL 3350 17 GRAM(S): 17 POWDER, FOR SOLUTION ORAL at 12:12

## 2020-05-01 RX ADMIN — HEPARIN SODIUM 5000 UNIT(S): 5000 INJECTION INTRAVENOUS; SUBCUTANEOUS at 21:10

## 2020-05-01 RX ADMIN — Medication 1000 MILLIGRAM(S): at 12:13

## 2020-05-01 RX ADMIN — Medication 50 GRAM(S): at 12:13

## 2020-05-01 RX ADMIN — Medication 325 MILLIGRAM(S): at 12:12

## 2020-05-01 NOTE — PROGRESS NOTE ADULT - SUBJECTIVE AND OBJECTIVE BOX
CC: Weakness, syncope, electrolyte abnormality is resolving. Early pregnancy.    HPI:  35 y/o F pt  currently 14 weeks pregnant presenting today after a syncopal episode while at the grocery store. Pt states that she was standing in line when she began to feel weak and her vision slowly faded and then passed out and she woke up on the ground. Pt states she hit her head, is currently c/o headache   Pt denies any sick contact ,no leg pain , no vision problems , she admits feeling weak for few weeks , also has nausea , appetite is fair  Denies any leg pain, leg swelling. Pt denies any chest pain, dyspnea, fevers, abdominal pain, dysuria, cough, congestion, sore throat, neck pain, weakness, numbness, tingling, or other complaint. Pt was seen and evaluated by OB and cleared prior to presenting to the ED.  Labs result in the noted to have K 2.7 , high calcium admitted for severe electrolyte depletion (2020 16:15)    REVIEW OF SYSTEMS:    Patient denied fever, chills, abdominal pain, nausea, vomiting, cough, shortness of breath, chest pain or palpitations    Vital Signs Last 24 Hrs  T(C): 36.7 (01 May 2020 07:44), Max: 37.6 (2020 15:54)  T(F): 98.1 (01 May 2020 07:44), Max: 99.6 (2020 15:54)  HR: 75 (01 May 2020 07:44) (73 - 81)  BP: 99/62 (01 May 2020 07:44) (99/62 - 117/76)  BP(mean): --  RR: 18 (01 May 2020 07:44) (18 - 18)  SpO2: 97% (01 May 2020 07:44) (95% - 99%)I&O's Summary    2020 07:01  -  01 May 2020 07:00  --------------------------------------------------------  IN: 1200 mL / OUT: 0 mL / NET: 1200 mL      PHYSICAL EXAM:  GENERAL: NAD, well-groomed  HEENT: PERRL, +EOMI, anicteric, no [place]  NECK: Supple, No JVD   CHEST/LUNG: CTA bilaterally; Normal effort  HEART: S1S2 Normal intensity, no murmurs, gallops or rubs noted  ABDOMEN: Soft, BS Normoactive, NT, ND, no HSM noted  EXTREMITIES:  2+ radial and DP pulses noted, no clubbing, cyanosis, or edema noted, FROM x 4  SKIN: No rashes or lesions noted  NEURO: A&Ox3, no focal deficits noted, CN II-XII intact  PSYCH: normal mood and affect; insight/judgement appropriate  LABS:                        10.4   8.14  )-----------( 256      ( 2020 10:10 )             31.7     04-30    140  |  104  |  6.0<L>  ----------------------------<  121<H>  3.2<L>   |  20.0<L>  |  0.47<L>    Ca    13.0<HH>      2020 10:10  Phos  2.0     -30  Mg     1.4         TPro  6.2  /  Alb  3.0<L>  /  TBili  x   /  DBili  x   /  AST  x   /  ALT  x   /  AlkPhos  x           RADIOLOGY & ADDITIONAL TESTS:    MEDICATIONS:  MEDICATIONS  (STANDING):  ascorbic acid 1000 milliGRAM(s) Oral daily  ferrous    sulfate 325 milliGRAM(s) Oral daily  heparin   Injectable 5000 Unit(s) SubCutaneous every 8 hours  polyethylene glycol 3350 17 Gram(s) Oral daily  prenatal multivitamin 1 Tablet(s) Oral daily  sodium chloride 0.9% with potassium chloride 40 mEq/L 1000 milliLiter(s) (100 mL/Hr) IV Continuous <Continuous>    MEDICATIONS  (PRN):  acetaminophen   Tablet .. 975 milliGRAM(s) Oral every 6 hours PRN Severe Pain (7 - 10)  ondansetron Injectable 4 milliGRAM(s) IV Push every 6 hours PRN Nausea and/or Vomiting

## 2020-05-01 NOTE — PROGRESS NOTE ADULT - SUBJECTIVE AND OBJECTIVE BOX
[facility] DIVISION OF KIDNEY DISEASES AND HYPERTENSION -- FOLLOW UP NOTE  --------------------------------------------------------------------------------  Chief Complaint:    24 hour events/subjective:        PAST HISTORY  --------------------------------------------------------------------------------  No significant changes to PMH, PSH, FHx, SHx, unless otherwise noted    ALLERGIES & MEDICATIONS  --------------------------------------------------------------------------------  Allergies    No Known Allergies    Intolerances      Standing Inpatient Medications  ascorbic acid 1000 milliGRAM(s) Oral daily  ferrous    sulfate 325 milliGRAM(s) Oral daily  heparin   Injectable 5000 Unit(s) SubCutaneous every 8 hours  magnesium sulfate  IVPB 2 Gram(s) IV Intermittent once  prenatal multivitamin 1 Tablet(s) Oral daily  sodium chloride 0.9% with potassium chloride 40 mEq/L 1000 milliLiter(s) IV Continuous <Continuous>    PRN Inpatient Medications  acetaminophen   Tablet .. 975 milliGRAM(s) Oral every 6 hours PRN  ondansetron Injectable 4 milliGRAM(s) IV Push every 6 hours PRN      REVIEW OF SYSTEMS  --------------------------------------------------------------------------------  Gen: No weight changes, fatigue, fevers/chills, weakness  Skin: No rashes  Head/Eyes/Ears/Mouth: No headache; Normal hearing; Normal vision w/o blurriness; No sinus pain/discomfort, sore throat  Respiratory: No dyspnea, cough, wheezing, hemoptysis  CV: No chest pain, PND, orthopnea  GI: No abdominal pain, diarrhea, constipation, nausea, vomiting, melena, hematochezia  : No increased frequency, dysuria, hematuria, nocturia  MSK: No joint pain/swelling; no back pain; no edema  Neuro: No dizziness/lightheadedness, weakness, seizures, numbness, tingling  Heme: No easy bruising or bleeding  Endo: No heat/cold intolerance  Psych: No significant nervousness, anxiety, stress, depression    All other systems were reviewed and are negative, except as noted.    VITALS/PHYSICAL EXAM  --------------------------------------------------------------------------------  T(C): 36.7 (05-01-20 @ 07:44), Max: 37.6 (04-30-20 @ 15:54)  HR: 75 (05-01-20 @ 07:44) (73 - 81)  BP: 99/62 (05-01-20 @ 07:44) (99/62 - 117/76)  RR: 18 (05-01-20 @ 07:44) (18 - 18)  SpO2: 97% (05-01-20 @ 07:44) (95% - 99%)  Wt(kg): --        04-30-20 @ 07:01  -  05-01-20 @ 07:00  --------------------------------------------------------  IN: 1200 mL / OUT: 0 mL / NET: 1200 mL      Physical Exam:  	Gen: NAD, well-appearing  	HEENT: PERRL, supple neck, clear oropharynx  	Pulm: CTA B/L  	CV: RRR, S1S2; no rub  	Back: No spinal or CVA tenderness; no sacral edema  	Abd: +BS, soft, nontender/nondistended  	: No suprapubic tenderness  	UE: Warm, FROM, no clubbing, intact strength; no edema; no asterixis  	LE: Warm, FROM, no clubbing, intact strength; no edema  	Neuro: No focal deficits, intact gait  	Psych: Normal affect and mood  	Skin: Warm, without rashes  	Vascular access:    LABS/STUDIES  --------------------------------------------------------------------------------              10.4   8.14  >-----------<  256      [04-30-20 @ 10:10]              31.7     140  |  104  |  6.0  ----------------------------<  121      [04-30-20 @ 10:10]  3.2   |  20.0  |  0.47        Ca     13.0     [04-30-20 @ 10:10]      Mg     1.4     [04-30-20 @ 10:09]      Phos  2.0     [04-30-20 @ 10:09]    TPro  6.2  /  Alb  x   /  TBili  x   /  DBili  x   /  AST  x   /  ALT  x   /  AlkPhos  x   [04-30-20 @ 21:36]      Creatinine Trend:  SCr 0.47 [04-30 @ 10:10]  SCr 0.46 [04-29 @ 06:41]  SCr 0.49 [04-28 @ 14:06]    Urinalysis - [04-28-20 @ 22:15]      Color Yellow / Appearance Clear / SG 1.010 / pH 7.0      Gluc Negative / Ketone Negative  / Bili Negative / Urobili Negative       Blood Negative / Protein Negative / Leuk Est Small / Nitrite Negative      RBC Negative / WBC 0-2 / Hyaline  / Gran  / Sq Epi  / Non Sq Epi Occasional / Bacteria     Urine Sodium 55      [04-28-20 @ 22:13]  Urine Urea Nitrogen 118      [04-29-20 @ 00:58]  Urine Potassium 12      [04-28-20 @ 22:13]  Urine Chloride 48      [04-28-20 @ 22:13]  Urine Phosphorus 9.3      [04-29-20 @ 00:58]    Iron 26, TIBC 420, %sat 6      [04-28-20 @ 18:11]  Ferritin 87      [04-28-20 @ 18:11]  PTH -- (Ca 12.9)      [04-29-20 @ 01:20]   76  Vitamin D (25OH) 13.1      [04-29-20 @ 01:20]  TSH 0.65      [04-29-20 @ 06:41] [facility] DIVISION OF KIDNEY DISEASES AND HYPERTENSION -- FOLLOW UP NOTE  --------------------------------------------------------------------------------  Chief Complaint: hypercalcemia, hypokalemia    24 hour events/subjective:        PAST HISTORY  --------------------------------------------------------------------------------  No significant changes to PMH, PSH, FHx, SHx, unless otherwise noted    ALLERGIES & MEDICATIONS  --------------------------------------------------------------------------------  Allergies    No Known Allergies    Intolerances      Standing Inpatient Medications  ascorbic acid 1000 milliGRAM(s) Oral daily  ferrous    sulfate 325 milliGRAM(s) Oral daily  heparin   Injectable 5000 Unit(s) SubCutaneous every 8 hours  magnesium sulfate  IVPB 2 Gram(s) IV Intermittent once  prenatal multivitamin 1 Tablet(s) Oral daily  sodium chloride 0.9% with potassium chloride 40 mEq/L 1000 milliLiter(s) IV Continuous <Continuous>    PRN Inpatient Medications  acetaminophen   Tablet .. 975 milliGRAM(s) Oral every 6 hours PRN  ondansetron Injectable 4 milliGRAM(s) IV Push every 6 hours PRN      REVIEW OF SYSTEMS  --------------------------------------------------------------------------------  Gen: No weight changes, fatigue, fevers/chills, weakness  Skin: No rashes  Head/Eyes/Ears/Mouth: No headache; Normal hearing; Normal vision w/o blurriness; No sinus pain/discomfort, sore throat  Respiratory: No dyspnea, cough, wheezing, hemoptysis  CV: No chest pain, PND, orthopnea  GI: No abdominal pain, diarrhea, constipation, nausea, vomiting, melena, hematochezia  : No increased frequency, dysuria, hematuria, nocturia  MSK: No joint pain/swelling; no back pain; no edema  Neuro: No dizziness/lightheadedness, weakness, seizures, numbness, tingling  Heme: No easy bruising or bleeding  Endo: No heat/cold intolerance  Psych: No significant nervousness, anxiety, stress, depression    All other systems were reviewed and are negative, except as noted.    VITALS/PHYSICAL EXAM  --------------------------------------------------------------------------------  T(C): 36.7 (05-01-20 @ 07:44), Max: 37.6 (04-30-20 @ 15:54)  HR: 75 (05-01-20 @ 07:44) (73 - 81)  BP: 99/62 (05-01-20 @ 07:44) (99/62 - 117/76)  RR: 18 (05-01-20 @ 07:44) (18 - 18)  SpO2: 97% (05-01-20 @ 07:44) (95% - 99%)  Wt(kg): --        04-30-20 @ 07:01  -  05-01-20 @ 07:00  --------------------------------------------------------  IN: 1200 mL / OUT: 0 mL / NET: 1200 mL      Physical Exam:  	Gen: NAD, well-appearing  	HEENT: PERRL, supple neck, clear oropharynx  	Pulm: CTA B/L  	CV: RRR, S1S2; no rub  	Back: No spinal or CVA tenderness; no sacral edema  	Abd: +BS, soft, nontender/nondistended  	: No suprapubic tenderness  	UE: Warm, FROM, no clubbing, intact strength; no edema; no asterixis  	LE: Warm, FROM, no clubbing, intact strength; no edema  	Neuro: No focal deficits, intact gait  	Psych: Normal affect and mood  	Skin: Warm, without rashes  	Vascular access:    LABS/STUDIES  --------------------------------------------------------------------------------              10.4   8.14  >-----------<  256      [04-30-20 @ 10:10]              31.7     140  |  104  |  6.0  ----------------------------<  121      [04-30-20 @ 10:10]  3.2   |  20.0  |  0.47        Ca     13.0     [04-30-20 @ 10:10]      Mg     1.4     [04-30-20 @ 10:09]      Phos  2.0     [04-30-20 @ 10:09]    TPro  6.2  /  Alb  x   /  TBili  x   /  DBili  x   /  AST  x   /  ALT  x   /  AlkPhos  x   [04-30-20 @ 21:36]      Creatinine Trend:  SCr 0.47 [04-30 @ 10:10]  SCr 0.46 [04-29 @ 06:41]  SCr 0.49 [04-28 @ 14:06]    Urinalysis - [04-28-20 @ 22:15]      Color Yellow / Appearance Clear / SG 1.010 / pH 7.0      Gluc Negative / Ketone Negative  / Bili Negative / Urobili Negative       Blood Negative / Protein Negative / Leuk Est Small / Nitrite Negative      RBC Negative / WBC 0-2 / Hyaline  / Gran  / Sq Epi  / Non Sq Epi Occasional / Bacteria     Urine Sodium 55      [04-28-20 @ 22:13]  Urine Urea Nitrogen 118      [04-29-20 @ 00:58]  Urine Potassium 12      [04-28-20 @ 22:13]  Urine Chloride 48      [04-28-20 @ 22:13]  Urine Phosphorus 9.3      [04-29-20 @ 00:58]    Iron 26, TIBC 420, %sat 6      [04-28-20 @ 18:11]  Ferritin 87      [04-28-20 @ 18:11]  PTH -- (Ca 12.9)      [04-29-20 @ 01:20]   76  Vitamin D (25OH) 13.1      [04-29-20 @ 01:20]  TSH 0.65      [04-29-20 @ 06:41] [facility] DIVISION OF KIDNEY DISEASES AND HYPERTENSION -- FOLLOW UP NOTE  --------------------------------------------------------------------------------  Chief Complaint: hypercalcemia, hypokalemia    24 hour events/subjective:  Getting IV hydration  Spoke to pt with help of pacific # [id number]  Pt denies any acute complaint  Updated pt with the plan, answered all questions        PAST HISTORY  --------------------------------------------------------------------------------  No significant changes to PMH, PSH, FHx, SHx, unless otherwise noted    ALLERGIES & MEDICATIONS  --------------------------------------------------------------------------------  Allergies    No Known Allergies    Intolerances      Standing Inpatient Medications  ascorbic acid 1000 milliGRAM(s) Oral daily  ferrous    sulfate 325 milliGRAM(s) Oral daily  heparin   Injectable 5000 Unit(s) SubCutaneous every 8 hours  magnesium sulfate  IVPB 2 Gram(s) IV Intermittent once  prenatal multivitamin 1 Tablet(s) Oral daily  sodium chloride 0.9% with potassium chloride 40 mEq/L 1000 milliLiter(s) IV Continuous <Continuous>    PRN Inpatient Medications  acetaminophen   Tablet .. 975 milliGRAM(s) Oral every 6 hours PRN  ondansetron Injectable 4 milliGRAM(s) IV Push every 6 hours PRN      REVIEW OF SYSTEMS  --------------------------------------------------------------------------------  Gen: No weight changes, fatigue, fevers/chills, weakness  Skin: No rashes  Head/Eyes/Ears/Mouth: No headache; Normal hearing; Normal vision w/o blurriness; No sinus pain/discomfort, sore throat  Respiratory: No dyspnea, cough, wheezing, hemoptysis  CV: No chest pain, PND, orthopnea  GI: No abdominal pain, diarrhea, constipation, nausea, vomiting, melena, hematochezia  : No increased frequency, dysuria, hematuria, nocturia  MSK: No joint pain/swelling; no back pain; no edema  Neuro: No dizziness/lightheadedness, weakness, seizures, numbness, tingling  Heme: No easy bruising or bleeding  Endo: No heat/cold intolerance  Psych: No significant nervousness, anxiety, stress, depression    All other systems were reviewed and are negative, except as noted.    VITALS/PHYSICAL EXAM  --------------------------------------------------------------------------------  T(C): 36.7 (05-01-20 @ 07:44), Max: 37.6 (04-30-20 @ 15:54)  HR: 75 (05-01-20 @ 07:44) (73 - 81)  BP: 99/62 (05-01-20 @ 07:44) (99/62 - 117/76)  RR: 18 (05-01-20 @ 07:44) (18 - 18)  SpO2: 97% (05-01-20 @ 07:44) (95% - 99%)  Wt(kg): --        04-30-20 @ 07:01  -  05-01-20 @ 07:00  --------------------------------------------------------  IN: 1200 mL / OUT: 0 mL / NET: 1200 mL      Physical Exam:  	Gen: NAD, well-appearing  	HEENT: PERRL, supple neck  	Pulm: CTA B/L  	CV: RRR, S1S2; no rub  	Back: No spinal or CVA tenderness; no sacral edema  	Abd: +BS, soft, nontender/gravid abdomen+  	: No suprapubic tenderness  	UE: Warm, no edema; no asterixis  	LE: Warm,  no edema  	Neuro: No focal deficits  	Psych: Normal affect and mood  	Skin: Warm      LABS/STUDIES  --------------------------------------------------------------------------------              10.4   8.14  >-----------<  256      [04-30-20 @ 10:10]              31.7     140  |  104  |  6.0  ----------------------------<  121      [04-30-20 @ 10:10]  3.2   |  20.0  |  0.47        Ca     13.0     [04-30-20 @ 10:10]      Mg     1.4     [04-30-20 @ 10:09]      Phos  2.0     [04-30-20 @ 10:09]    TPro  6.2  /  Alb  x   /  TBili  x   /  DBili  x   /  AST  x   /  ALT  x   /  AlkPhos  x   [04-30-20 @ 21:36]      Creatinine Trend:  SCr 0.47 [04-30 @ 10:10]  SCr 0.46 [04-29 @ 06:41]  SCr 0.49 [04-28 @ 14:06]    Urinalysis - [04-28-20 @ 22:15]      Color Yellow / Appearance Clear / SG 1.010 / pH 7.0      Gluc Negative / Ketone Negative  / Bili Negative / Urobili Negative       Blood Negative / Protein Negative / Leuk Est Small / Nitrite Negative      RBC Negative / WBC 0-2 / Hyaline  / Gran  / Sq Epi  / Non Sq Epi Occasional / Bacteria     Urine Sodium 55      [04-28-20 @ 22:13]  Urine Urea Nitrogen 118      [04-29-20 @ 00:58]  Urine Potassium 12      [04-28-20 @ 22:13]  Urine Chloride 48      [04-28-20 @ 22:13]  Urine Phosphorus 9.3      [04-29-20 @ 00:58]    Iron 26, TIBC 420, %sat 6      [04-28-20 @ 18:11]  Ferritin 87      [04-28-20 @ 18:11]  PTH -- (Ca 12.9)      [04-29-20 @ 01:20]   76  Vitamin D (25OH) 13.1      [04-29-20 @ 01:20]  TSH 0.65      [04-29-20 @ 06:41]

## 2020-05-01 NOTE — PROGRESS NOTE ADULT - ASSESSMENT
35 yo F 14 weeks pregnant ( per her GYN attending ) , reported recent COVID positive test admittted for syncopal episode , weakness found to have K 2.7 , hypomagnesemia and hypercalcemia , reported recent covid 19 positive infection     1- Syncope likely due to electrolyte imbalance   Symptoms resolving   TTE no structural abnormalities.  EF 55-60%   cardiology - arrhythmia likely from electrolyte abnormality.  Cardiac monitor. Outpatient .  Eventual 21 days outpatient [facility] monitoring   Leg dopplers no DVTs  . D-dimer 155     2- Hypokalemia / hypomagnesemia / hypophosphatemia   cont to replace lytes to keep K 4 and over , mg over 1.8/2   Renal is following .High calcium is a concern. To obtain neck ultrasound to rule out parathyroid adenoma .  Nephrology contemplating cacitonine if ok with MFM  Gitelman syndrome unlikely due to low aldolase  cont iv fluid   24 hours urine collection for ordered     4- Anemia of iron deficiency and vit b12 low   add ferrous supplement   vit b 12 injection x2 days then po vit b 12     5-vit D deficiency   no vit d supplement , will avoid supplement due to high calcium     6- Recent COVID infection positive 3/26 per her gyn attending outpatient records   repeat neg on 4/22 and 4/29   7- 14 weeks pregnancy   Dr Pepe called yesterday with him over the phone   he does not rec any gyn testing [facility] at present   OBGYN not coming  to [facility] in patient due to COVID rules regulations he states   call as needed 35 yo F 14 weeks pregnant ( per her GYN attending ) , reported recent COVID positive test admittted for syncopal episode , weakness found to have K 2.7 , hypomagnesemia and hypercalcemia , reported recent covid 19 positive infection     1- Syncope likely due to electrolyte imbalance   Symptoms resolving   TTE no structural abnormalities.  EF 55-60%   cardiology - arrhythmia likely from electrolyte abnormality.  Cardiac monitor. Outpatient .  Eventual 21 days outpatient [facility] monitoring   Leg dopplers no DVTs  . D-dimer 155     2- Hypokalemia / hypomagnesemia / hypophosphatemia/Hypercalemia  cont to replace lytes to keep K 4 and over , mg over 1.8/2   Renal is following .High calcium is a concern. To obtain neck ultrasound to rule out parathyroid adenoma .  Nephrology contemplating cacitonine if ok with MFM  Gitelman syndrome unlikely due to low aldolase  cont iv fluid   24 hours urine collection ordered     4- Anemia of iron deficiency and vit b12 low   add ferrous supplement   vit b 12 injection x2 days then po vit b 12     5-vit D deficiency   no vit d supplement , will avoid supplement due to high calcium     6- Recent COVID infection positive 3/26 per her gyn attending outpatient records   repeat neg on 4/22 and 4/29   7- 14 weeks pregnancy   Dr Afshin RAYMOND said -he does not rec any gyn testing [facility] at present   [facility]ANDI not coming  to [facility] in patient due to COVID rules regulations he states   call as needed

## 2020-05-01 NOTE — PROGRESS NOTE ADULT - ASSESSMENT
1. Syncope  2. Multiple electrolyte imbalances  3. Hyperparathyroidism    34 year old pregnant woman (; 14 weeks gestation)  with syncopal episode.  Multiple electrolyte abnormalities including hypokalemia, hypercalcemia and hypomagnesemia.  ?? Gitelman's syndrome;   given positive family history- exacerbated in pregnancy by increasing fetal demand  Pt however is acidotic and also with low funmi; which goes against the diagnosis--   Positive UAG- RTA?   Will repeat complete set of urine lytes  Repeat funmi levels and plasma renin activity    Also with elevated iPTH, low phos and low Vitamin D- ? PHPT with coexisting vitamin D def vs SHPT (however Ca++ levels are high)   Will get 24 hour urinary calcium collection  Send FLC, SPEP, PTHrP, 1-25 (OH)2 vitamin D levels  Also obtain 24-h urine K+ and funmi levels    IV Mg-sulfate 2 gm x1 today (ordered); goal to keep Mg > 2  Will start K- phos po; replete K+     Hold off on Vitamin D supplementation given hypercalcemia  Will give Calcitonin x 4 doses if cleared by MFM    Obtain head and neck US to r/o parathyroid adenoma  Endogenous calcitonin does not cross the placenta (Erna 2015). Information related to the use of calcitonin in pregnancy is limited (Trung 2018; Clary 2011; Tonya 2018; Elise 2012).     Reached out to PCP Dr. Riggs to obtain past records; haven't heard back yet  She will likely need aggressive management of electrolytes as pregnancy proceeds. 1. Syncope  2. Multiple electrolyte imbalances  3. Hyperparathyroidism    34 year old pregnant woman (; 14 weeks gestation)  with syncopal episode.  Multiple electrolyte abnormalities including hypokalemia, hypercalcemia and hypomagnesemia.  ?? Gitelman's syndrome;   given positive family history- exacerbated in pregnancy by increasing fetal demand  Pt however is acidotic and also with low funmi; which goes against the diagnosis--   Positive UAG- RTA?   Will repeat complete set of urine lytes  Repeat funmi levels and plasma renin activity    Also with elevated iPTH, low phos and low Vitamin D- ? PHPT with coexisting vitamin D def vs SHPT (however Ca++ levels are high)   Will get 24 hour urinary calcium collection   1-25 (OH)2 vitamin D levels elevated ~117; obtain CXR , ACE and LDH levels    Send FLC, SPEP, PTHrP,Also obtain 24-h urine K+ and funmi levels    IV Mg-sulfate 2 gm x1 today (ordered); goal to keep Mg > 2  Will start K- phos po; replete K+       Will give Calcitonin x 4 doses if cleared by MFM  Obtain head and neck US to r/o parathyroid adenoma  Endogenous calcitonin does not cross the placenta (Erna 2015). Information related to the use of calcitonin in pregnancy is limited (Trung 2018; Clary 2011; Tonya 2018; Elise 2012).     Reached out to PCP Dr. Riggs to obtain past records; haven't heard back yet  She will likely need aggressive management of electrolytes as pregnancy proceeds.

## 2020-05-02 LAB
ALBUMIN SERPL ELPH-MCNC: 3.4 G/DL — SIGNIFICANT CHANGE UP (ref 3.3–5.2)
ALP SERPL-CCNC: 94 U/L — SIGNIFICANT CHANGE UP (ref 40–120)
ALT FLD-CCNC: 12 U/L — SIGNIFICANT CHANGE UP
ANION GAP SERPL CALC-SCNC: 10 MMOL/L — SIGNIFICANT CHANGE UP (ref 5–17)
AST SERPL-CCNC: 18 U/L — SIGNIFICANT CHANGE UP
BILIRUB SERPL-MCNC: <0.2 MG/DL — LOW (ref 0.4–2)
BUN SERPL-MCNC: 6 MG/DL — LOW (ref 8–20)
CALCIUM 24H UR-MRATE: 416 MG/24 H — HIGH (ref 50–150)
CALCIUM SERPL-MCNC: 13.6 MG/DL — CRITICAL HIGH (ref 8.6–10.2)
CHLORIDE SERPL-SCNC: 105 MMOL/L — SIGNIFICANT CHANGE UP (ref 98–107)
CO2 SERPL-SCNC: 22 MMOL/L — SIGNIFICANT CHANGE UP (ref 22–29)
COLLECT DURATION TIME UR: 24 HR — SIGNIFICANT CHANGE UP
CREAT SERPL-MCNC: 0.5 MG/DL — SIGNIFICANT CHANGE UP (ref 0.5–1.3)
GLUCOSE SERPL-MCNC: 93 MG/DL — SIGNIFICANT CHANGE UP (ref 70–99)
HCT VFR BLD CALC: 30 % — LOW (ref 34.5–45)
HGB BLD-MCNC: 9.9 G/DL — LOW (ref 11.5–15.5)
MAGNESIUM SERPL-MCNC: 1.4 MG/DL — LOW (ref 1.6–2.6)
MCHC RBC-ENTMCNC: 27.5 PG — SIGNIFICANT CHANGE UP (ref 27–34)
MCHC RBC-ENTMCNC: 33 GM/DL — SIGNIFICANT CHANGE UP (ref 32–36)
MCV RBC AUTO: 83.3 FL — SIGNIFICANT CHANGE UP (ref 80–100)
PHOSPHATE SERPL-MCNC: 2.8 MG/DL — SIGNIFICANT CHANGE UP (ref 2.4–4.7)
PLATELET # BLD AUTO: 254 K/UL — SIGNIFICANT CHANGE UP (ref 150–400)
POTASSIUM SERPL-MCNC: 3.8 MMOL/L — SIGNIFICANT CHANGE UP (ref 3.5–5.3)
POTASSIUM SERPL-SCNC: 3.8 MMOL/L — SIGNIFICANT CHANGE UP (ref 3.5–5.3)
PROT SERPL-MCNC: 6.5 G/DL — LOW (ref 6.6–8.7)
RBC # BLD: 3.6 M/UL — LOW (ref 3.8–5.2)
RBC # FLD: 13.9 % — SIGNIFICANT CHANGE UP (ref 10.3–14.5)
SODIUM SERPL-SCNC: 137 MMOL/L — SIGNIFICANT CHANGE UP (ref 135–145)
TOTAL VOLUME - 24 HOUR: 5200 ML — SIGNIFICANT CHANGE UP
WBC # BLD: 8.68 K/UL — SIGNIFICANT CHANGE UP (ref 3.8–10.5)
WBC # FLD AUTO: 8.68 K/UL — SIGNIFICANT CHANGE UP (ref 3.8–10.5)

## 2020-05-02 PROCEDURE — 99233 SBSQ HOSP IP/OBS HIGH 50: CPT

## 2020-05-02 RX ORDER — MAGNESIUM SULFATE 500 MG/ML
2 VIAL (ML) INJECTION ONCE
Refills: 0 | Status: COMPLETED | OUTPATIENT
Start: 2020-05-02 | End: 2020-05-02

## 2020-05-02 RX ADMIN — Medication 325 MILLIGRAM(S): at 12:44

## 2020-05-02 RX ADMIN — HEPARIN SODIUM 5000 UNIT(S): 5000 INJECTION INTRAVENOUS; SUBCUTANEOUS at 05:09

## 2020-05-02 RX ADMIN — Medication 1000 MILLIGRAM(S): at 12:46

## 2020-05-02 RX ADMIN — POLYETHYLENE GLYCOL 3350 17 GRAM(S): 17 POWDER, FOR SOLUTION ORAL at 12:43

## 2020-05-02 RX ADMIN — Medication 50 GRAM(S): at 12:43

## 2020-05-02 RX ADMIN — DEXTROSE MONOHYDRATE, SODIUM CHLORIDE, AND POTASSIUM CHLORIDE 100 MILLILITER(S): 50; .745; 4.5 INJECTION, SOLUTION INTRAVENOUS at 21:06

## 2020-05-02 RX ADMIN — Medication 1 TABLET(S): at 12:44

## 2020-05-02 RX ADMIN — HEPARIN SODIUM 5000 UNIT(S): 5000 INJECTION INTRAVENOUS; SUBCUTANEOUS at 21:06

## 2020-05-02 RX ADMIN — HEPARIN SODIUM 5000 UNIT(S): 5000 INJECTION INTRAVENOUS; SUBCUTANEOUS at 17:42

## 2020-05-02 NOTE — PROGRESS NOTE ADULT - SUBJECTIVE AND OBJECTIVE BOX
CC: Syncope, electrolyte abnormality in early pregnancy. Headache.   HPI:  35 y/o F pt  currently 14 weeks pregnant presenting today after a syncopal episode while at the grocery store. Pt states that she was standing in line when she began to feel weak and her vision slowly faded and then passed out and she woke up on the ground. Pt states she hit her head, is currently c/o headache   Pt denies any sick contact ,no leg pain , no vision problems , she admits feeling weak for few weeks , also has nausea , appetite is fair  Denies any leg pain, leg swelling. Pt denies any chest pain, dyspnea, fevers, abdominal pain, dysuria, cough, congestion, sore throat, neck pain, weakness, numbness, tingling, or other complaint. Pt was seen and evaluated by OB and cleared prior to presenting to the ED.  Labs result in the noted to have K 2.7 , high calcium admitted for severe electrolyte depletion (2020 16:15)    REVIEW OF SYSTEMS:    Patient denied fever, chills, abdominal pain, nausea, vomiting, cough, shortness of breath, chest pain or palpitations    Vital Signs Last 24 Hrs  T(C): 37 (02 May 2020 08:31), Max: 37 (02 May 2020 08:31)  T(F): 98.6 (02 May 2020 08:31), Max: 98.6 (02 May 2020 08:31)  HR: 89 (02 May 2020 08:31) (77 - 89)  BP: 119/77 (02 May 2020 08:31) (110/72 - 121/65)  BP(mean): --  RR: 20 (02 May 2020 08:31) (18 - 20)  SpO2: 98% (02 May 2020 08:31) (96% - 98%)I&O's Summary    01 May 2020 07:01  -  02 May 2020 07:00  --------------------------------------------------------  IN: 1200 mL / OUT: 0 mL / NET: 1200 mL      PHYSICAL EXAM:  GENERAL: NAD, well-groomed  HEENT: PERRL, +EOMI, anicteric, no [place]  NECK: Supple, No JVD   CHEST/LUNG: CTA bilaterally; Normal effort  HEART: S1S2 Normal intensity, no murmurs, gallops or rubs noted  ABDOMEN: Soft, BS Normoactive, NT, ND, no HSM noted  EXTREMITIES:  2+ radial and DP pulses noted, no clubbing, cyanosis, or edema noted, FROM x 4  SKIN: No rashes or lesions noted  NEURO: A&Ox3, no focal deficits noted, CN II-XII intact  PSYCH: normal mood and affect; insight/judgement appropriate  LABS:                        9.9    8.68  )-----------( 254      ( 02 May 2020 06:09 )             30.0     05-02    137  |  105  |  6.0<L>  ----------------------------<  93  3.8   |  22.0  |  0.50    Ca    13.6<HH>      02 May 2020 06:08  Phos  2.8     05-02  Mg     1.4     05-02    TPro  6.5<L>  /  Alb  3.4  /  TBili  <0.2<L>  /  DBili  x   /  AST  18  /  ALT  12  /  AlkPhos  94  05-02        RADIOLOGY & ADDITIONAL TESTS:    MEDICATIONS:  MEDICATIONS  (STANDING):  ascorbic acid 1000 milliGRAM(s) Oral daily  ferrous    sulfate 325 milliGRAM(s) Oral daily  heparin   Injectable 5000 Unit(s) SubCutaneous every 8 hours  polyethylene glycol 3350 17 Gram(s) Oral daily  prenatal multivitamin 1 Tablet(s) Oral daily  sodium chloride 0.9% with potassium chloride 40 mEq/L 1000 milliLiter(s) (100 mL/Hr) IV Continuous <Continuous>    MEDICATIONS  (PRN):  acetaminophen   Tablet .. 975 milliGRAM(s) Oral every 6 hours PRN Severe Pain (7 - 10)  ondansetron Injectable 4 milliGRAM(s) IV Push every 6 hours PRN Nausea and/or Vomiting

## 2020-05-02 NOTE — PROGRESS NOTE ADULT - ASSESSMENT
35 yo F 14 weeks pregnant ( per her GYN attending ) , reported recent COVID positive test admittted for syncopal episode , weakness found to have K 2.7 , hypomagnesemia and hypercalcemia , reported recent covid 19 positive infection     1- Syncope likely due to electrolyte imbalance   Symptoms resolving   TTE no structural abnormalities.  EF 55-60%   cardiology - arrhythmia likely from electrolyte abnormality.  Cardiac monitor. Outpatient .  Eventual 21 days outpatient [facility] monitoring   Leg dopplers no DVTs  . D-dimer 155     2- Hypokalemia / hypomagnesemia / hypophosphatemia/ Hypercalemia  cont to replace lytes to keep K 4 and over , mg over 1.8/2   Renal is following. High calcium is a concern. To obtain neck ultrasound to rule out parathyroid adenoma .  Nephrology contemplating calcitonine if ok with MFM  Gitelman syndrome unlikely due to low aldolase  cont iv fluid   24 hours urine collection ordered     4- Anemia of iron deficiency and vit b12 low   add ferrous supplement   vit b 12 injection x2 days then po vit b 12     5-vit D deficiency   no vit d supplement , will avoid supplement due to high calcium     6- Recent COVID infection positive 3/26 per her gyn attending outpatient records   repeat neg on 4/22 and 4/29   7- 14 weeks pregnancy   Dr Afshin RAYMOND said -he does not rec any gyn testing Sono at present   MANDI not coming  to [facility] in patient due to COVID rules regulations he states   call as needed     Disp:  Awaiting resolution of electrolyte abnormality hypercalcemia, hypomag.  Nephrology contemplating use of calcitonin to reverese hypercalcemia.  Awaiting MFM clearance for use in early pregnancy.    Supportive care.

## 2020-05-03 LAB
ALBUMIN SERPL ELPH-MCNC: 3.6 G/DL — SIGNIFICANT CHANGE UP (ref 3.3–5.2)
ALP SERPL-CCNC: 93 U/L — SIGNIFICANT CHANGE UP (ref 40–120)
ALT FLD-CCNC: 15 U/L — SIGNIFICANT CHANGE UP
ANION GAP SERPL CALC-SCNC: 15 MMOL/L — SIGNIFICANT CHANGE UP (ref 5–17)
AST SERPL-CCNC: 19 U/L — SIGNIFICANT CHANGE UP
BILIRUB SERPL-MCNC: <0.2 MG/DL — LOW (ref 0.4–2)
BUN SERPL-MCNC: 7 MG/DL — LOW (ref 8–20)
CALCIUM SERPL-MCNC: 13.7 MG/DL — CRITICAL HIGH (ref 8.6–10.2)
CHLORIDE SERPL-SCNC: 102 MMOL/L — SIGNIFICANT CHANGE UP (ref 98–107)
CO2 SERPL-SCNC: 21 MMOL/L — LOW (ref 22–29)
CREAT SERPL-MCNC: 0.46 MG/DL — LOW (ref 0.5–1.3)
GLUCOSE SERPL-MCNC: 84 MG/DL — SIGNIFICANT CHANGE UP (ref 70–99)
HCT VFR BLD CALC: 31.6 % — LOW (ref 34.5–45)
HGB BLD-MCNC: 10.3 G/DL — LOW (ref 11.5–15.5)
MAGNESIUM SERPL-MCNC: 1.5 MG/DL — LOW (ref 1.6–2.6)
MCHC RBC-ENTMCNC: 27.6 PG — SIGNIFICANT CHANGE UP (ref 27–34)
MCHC RBC-ENTMCNC: 32.6 GM/DL — SIGNIFICANT CHANGE UP (ref 32–36)
MCV RBC AUTO: 84.7 FL — SIGNIFICANT CHANGE UP (ref 80–100)
PLATELET # BLD AUTO: 248 K/UL — SIGNIFICANT CHANGE UP (ref 150–400)
POTASSIUM SERPL-MCNC: 4 MMOL/L — SIGNIFICANT CHANGE UP (ref 3.5–5.3)
POTASSIUM SERPL-SCNC: 4 MMOL/L — SIGNIFICANT CHANGE UP (ref 3.5–5.3)
PROT SERPL-MCNC: 6.7 G/DL — SIGNIFICANT CHANGE UP (ref 6.6–8.7)
RBC # BLD: 3.73 M/UL — LOW (ref 3.8–5.2)
RBC # FLD: 14.2 % — SIGNIFICANT CHANGE UP (ref 10.3–14.5)
SODIUM SERPL-SCNC: 138 MMOL/L — SIGNIFICANT CHANGE UP (ref 135–145)
WBC # BLD: 9.46 K/UL — SIGNIFICANT CHANGE UP (ref 3.8–10.5)
WBC # FLD AUTO: 9.46 K/UL — SIGNIFICANT CHANGE UP (ref 3.8–10.5)

## 2020-05-03 PROCEDURE — 99233 SBSQ HOSP IP/OBS HIGH 50: CPT

## 2020-05-03 RX ORDER — MAGNESIUM SULFATE 500 MG/ML
2 VIAL (ML) INJECTION ONCE
Refills: 0 | Status: COMPLETED | OUTPATIENT
Start: 2020-05-03 | End: 2020-05-03

## 2020-05-03 RX ADMIN — Medication 50 GRAM(S): at 13:30

## 2020-05-03 RX ADMIN — Medication 975 MILLIGRAM(S): at 16:40

## 2020-05-03 RX ADMIN — HEPARIN SODIUM 5000 UNIT(S): 5000 INJECTION INTRAVENOUS; SUBCUTANEOUS at 13:30

## 2020-05-03 RX ADMIN — POLYETHYLENE GLYCOL 3350 17 GRAM(S): 17 POWDER, FOR SOLUTION ORAL at 13:30

## 2020-05-03 RX ADMIN — HEPARIN SODIUM 5000 UNIT(S): 5000 INJECTION INTRAVENOUS; SUBCUTANEOUS at 05:45

## 2020-05-03 RX ADMIN — HEPARIN SODIUM 5000 UNIT(S): 5000 INJECTION INTRAVENOUS; SUBCUTANEOUS at 22:10

## 2020-05-03 RX ADMIN — Medication 1 TABLET(S): at 13:29

## 2020-05-03 RX ADMIN — Medication 325 MILLIGRAM(S): at 13:29

## 2020-05-03 RX ADMIN — Medication 1000 MILLIGRAM(S): at 13:33

## 2020-05-03 NOTE — PROGRESS NOTE ADULT - ASSESSMENT
33 yo F 14 weeks pregnant ( per her GYN attending ) , reported recent COVID positive test admittted for syncopal episode , weakness found to have K 2.7 , hypomagnesemia and hypercalcemia , reported recent covid 19 positive infection     1- Syncope likely due to electrolyte imbalance   Symptoms resolving   TTE no structural abnormalities.  EF 55-60%   cardiology - arrhythmia likely from electrolyte abnormality.  Cardiac monitor. Outpatient .  Eventual 21 days outpatient [facility] monitoring   Leg dopplers no DVTs  . D-dimer 155     2- Hypokalemia / hypomagnesemia / hypophosphatemia/ Hypercalemia  cont to replace lytes to keep K 4 and over , mg over 1.8/2   Renal is following. High calcium is a concern. To obtain neck/thyroid MRI to rule out parathyroid adenoma .  Nephrology contemplating calcitonine if ok with MFM  Discussed with OB-Resident who will talk to her attending and get back to me per use of calcitonin to bring down calcium level in the pregnant patient.   Gitelman syndrome unlikely due to low aldolase  cont iv fluid   Repeat 24 hours urine collection ordered      4- Anemia of iron deficiency and vit b12 low   add ferrous supplement   vit b 12 injection x2 days then po vit b 12     5-vit D deficiency   no vit d supplement , will avoid supplement due to high calcium     6- Recent COVID infection positive 3/26 per her gyn attending outpatient records   repeat neg on 4/22 and 4/29   7- 14 weeks pregnancy   Dr Afshin RAYMOND said -he does not rec any gyn testing Sono at present   MANDI not coming  to [facility] in patient due to COVID rules regulations he states   call as needed     Disp:  Awaiting resolution of electrolyte abnormality hypercalcemia, hypomag.  Nephrology contemplating use of calcitonin to reverese hypercalcemia.  Awaiting MFM clearance for use in early pregnancy.    Supportive care.

## 2020-05-03 NOTE — PROGRESS NOTE ADULT - SUBJECTIVE AND OBJECTIVE BOX
CC: Syncope weakness electrolyte abnormality in early pregnancy. Hypercalcemia, hypomagnesemia.   HPI:  33 y/o F pt  currently 14 weeks pregnant presenting today after a syncopal episode while at the grocery store. Pt states that she was standing in line when she began to feel weak and her vision slowly faded and then passed out and she woke up on the ground. Pt states she hit her head, is currently c/o headache   Pt denies any sick contact ,no leg pain , no vision problems , she admits feeling weak for few weeks , also has nausea , appetite is fair  Denies any leg pain, leg swelling. Pt denies any chest pain, dyspnea, fevers, abdominal pain, dysuria, cough, congestion, sore throat, neck pain, weakness, numbness, tingling, or other complaint. Pt was seen and evaluated by OB and cleared prior to presenting to the ED.  Labs result in the noted to have K 2.7 , high calcium admitted for severe electrolyte depletion (2020 16:15)    REVIEW OF SYSTEMS:    Patient denied fever, chills, abdominal pain, nausea, vomiting, cough, shortness of breath, chest pain or palpitations    Vital Signs Last 24 Hrs  T(C): 36.8 (03 May 2020 08:14), Max: 36.9 (03 May 2020 05:54)  T(F): 98.2 (03 May 2020 08:14), Max: 98.4 (03 May 2020 05:54)  HR: 88 (03 May 2020 08:14) (75 - 88)  BP: 117/81 (03 May 2020 08:14) (104/65 - 121/77)  BP(mean): --  RR: 16 (03 May 2020 08:14) (16 - 18)  SpO2: 98% (03 May 2020 08:14) (96% - 98%)I&O's Summary    02 May 2020 07:01  -  03 May 2020 07:00  --------------------------------------------------------  IN: 1300 mL / OUT: 0 mL / NET: 1300 mL      PHYSICAL EXAM:  GENERAL: NAD, well-groomed  HEENT: PERRL, +EOMI, anicteric, no [place]  NECK: Supple, No JVD   CHEST/LUNG: CTA bilaterally; Normal effort  HEART: S1S2 Normal intensity, no murmurs, gallops or rubs noted  ABDOMEN: Soft, BS Normoactive, NT, ND, no HSM noted  EXTREMITIES:  2+ radial and DP pulses noted, no clubbing, cyanosis, or edema noted, FROM x 4  SKIN: No rashes or lesions noted  NEURO: A&Ox3, no focal deficits noted, CN II-XII intact  PSYCH: normal mood and affect; insight/judgement appropriate  LABS:                        10.3   9.46  )-----------( 248      ( 03 May 2020 06:49 )             31.6     05-03    138  |  102  |  7.0<L>  ----------------------------<  84  4.0   |  21.0<L>  |  0.46<L>    Ca    13.7<HH>      03 May 2020 06:45  Phos  2.8     05-02  Mg     1.5     05-03    TPro  6.7  /  Alb  3.6  /  TBili  <0.2<L>  /  DBili  x   /  AST  19  /  ALT  15  /  AlkPhos  93  05-03        RADIOLOGY & ADDITIONAL TESTS:    MEDICATIONS:  MEDICATIONS  (STANDING):  ascorbic acid 1000 milliGRAM(s) Oral daily  ferrous    sulfate 325 milliGRAM(s) Oral daily  heparin   Injectable 5000 Unit(s) SubCutaneous every 8 hours  polyethylene glycol 3350 17 Gram(s) Oral daily  prenatal multivitamin 1 Tablet(s) Oral daily  sodium chloride 0.9% with potassium chloride 40 mEq/L 1000 milliLiter(s) (100 mL/Hr) IV Continuous <Continuous>    MEDICATIONS  (PRN):  acetaminophen   Tablet .. 975 milliGRAM(s) Oral every 6 hours PRN Severe Pain (7 - 10)  ondansetron Injectable 4 milliGRAM(s) IV Push every 6 hours PRN Nausea and/or Vomiting

## 2020-05-03 NOTE — CHART NOTE - NSCHARTNOTEFT_GEN_A_CORE
24 hour urine results noted    Calcium, Urine 24 Hour (05.02.20 @ 01:39)    Urine Creatinine Calculation: 1.6 g/24 hr    Total Volume - 24 Hour: 5200 mL    Interval Timed: 24 HR    Urine Calcium Calculation: 416 mg/24 h    Potassium, Urine 24 Hour (05.01.20 @ 14:49)    Urine Creatinine Calculation: 1.6 g/24 hr    Interval Timed: 24 HR    Potassium Calculation: 130 mmol/24hr    Total Volume - 24 Hour: 5200 mL      High urinary calcium- pt likely with PHPT with coexisting Vitamin D def  High 1-25(OH)2 vitamin D likely driven by low phos  Hypercalcemia causing low Mg++ which in turn is driving low K+  Replete Mg++ levels to keep > 2  Will give Calcitonin 4 IU/kg x 4 doses; will need MFM clearance  No Vitamin D supplementation as it will worsen hypercalcemia  Will get MRI neck; Endocrine surgery consult for possible exploration vs resection    Discussed with Dr. Callejas 24 hour urine results noted    Calcium, Urine 24 Hour (05.02.20 @ 01:39)    Urine Creatinine Calculation: 1.6 g/24 hr    Total Volume - 24 Hour: 5200 mL    Interval Timed: 24 HR    Urine Calcium Calculation: 416 mg/24 h    Potassium, Urine 24 Hour (05.01.20 @ 14:49)    Urine Creatinine Calculation: 1.6 g/24 hr    Interval Timed: 24 HR    Potassium Calculation: 130 mmol/24hr    Total Volume - 24 Hour: 5200 mL      High urinary calcium- pt likely with PHPT with coexisting Vitamin D def  High 1-25(OH)2 vitamin D likely driven by low phos  Hypercalcemia causing low Mg++ which in turn is driving low K+  Replete Mg++ levels to keep > 2  Will give Calcitonin 4 IU/kg x 4 doses; will need MFM clearance  No Vitamin D supplementation as it will worsen hypercalcemia  Will get MRI neck; Endocrine surgery consult (Dr.John To) 24 hour urine results noted    Calcium, Urine 24 Hour (05.02.20 @ 01:39)    Urine Creatinine Calculation: 1.6 g/24 hr    Total Volume - 24 Hour: 5200 mL    Interval Timed: 24 HR    Urine Calcium Calculation: 416 mg/24 h    Potassium, Urine 24 Hour (05.01.20 @ 14:49)    Urine Creatinine Calculation: 1.6 g/24 hr    Interval Timed: 24 HR    Potassium Calculation: 130 mmol/24hr    Total Volume - 24 Hour: 5200 mL      High urinary calcium- pt likely with PHPT with coexisting Vitamin D def  High 1-25(OH)2 vitamin D likely driven by low phos  Hypercalcemia causing low Mg++ which in turn is driving low K+  Replete Mg++ levels to keep > 2  Will give Calcitonin 4 IU/kg x 4 doses; will need MFM clearance  No Vitamin D supplementation as it will worsen hypercalcemia  Will get MRI neck; Endocrine surgery consult (Dr.John To) for possible exploration vs resection  Discussed with Dr. Callejas

## 2020-05-04 LAB
ALBUMIN SERPL ELPH-MCNC: 3.5 G/DL — SIGNIFICANT CHANGE UP (ref 3.3–5.2)
ALP SERPL-CCNC: 96 U/L — SIGNIFICANT CHANGE UP (ref 40–120)
ALT FLD-CCNC: 16 U/L — SIGNIFICANT CHANGE UP
ANION GAP SERPL CALC-SCNC: 12 MMOL/L — SIGNIFICANT CHANGE UP (ref 5–17)
AST SERPL-CCNC: 19 U/L — SIGNIFICANT CHANGE UP
BILIRUB SERPL-MCNC: <0.2 MG/DL — LOW (ref 0.4–2)
BUN SERPL-MCNC: 8 MG/DL — SIGNIFICANT CHANGE UP (ref 8–20)
CALCIUM SERPL-MCNC: 13.8 MG/DL — CRITICAL HIGH (ref 8.6–10.2)
CHLORIDE SERPL-SCNC: 104 MMOL/L — SIGNIFICANT CHANGE UP (ref 98–107)
CO2 SERPL-SCNC: 19 MMOL/L — LOW (ref 22–29)
CREAT SERPL-MCNC: 0.44 MG/DL — LOW (ref 0.5–1.3)
GLUCOSE SERPL-MCNC: 87 MG/DL — SIGNIFICANT CHANGE UP (ref 70–99)
HCT VFR BLD CALC: 31.9 % — LOW (ref 34.5–45)
HGB BLD-MCNC: 10.5 G/DL — LOW (ref 11.5–15.5)
LDH SERPL L TO P-CCNC: 183 U/L — SIGNIFICANT CHANGE UP (ref 98–192)
MAGNESIUM SERPL-MCNC: 1.6 MG/DL — SIGNIFICANT CHANGE UP (ref 1.6–2.6)
MCHC RBC-ENTMCNC: 27.8 PG — SIGNIFICANT CHANGE UP (ref 27–34)
MCHC RBC-ENTMCNC: 32.9 GM/DL — SIGNIFICANT CHANGE UP (ref 32–36)
MCV RBC AUTO: 84.4 FL — SIGNIFICANT CHANGE UP (ref 80–100)
PLATELET # BLD AUTO: 284 K/UL — SIGNIFICANT CHANGE UP (ref 150–400)
POTASSIUM SERPL-MCNC: 4 MMOL/L — SIGNIFICANT CHANGE UP (ref 3.5–5.3)
POTASSIUM SERPL-SCNC: 4 MMOL/L — SIGNIFICANT CHANGE UP (ref 3.5–5.3)
PROT SERPL-MCNC: 7 G/DL — SIGNIFICANT CHANGE UP (ref 6.6–8.7)
PTH RELATED PROT SERPL-MCNC: <2 PMOL/L — SIGNIFICANT CHANGE UP
RBC # BLD: 3.78 M/UL — LOW (ref 3.8–5.2)
RBC # FLD: 14.2 % — SIGNIFICANT CHANGE UP (ref 10.3–14.5)
SODIUM SERPL-SCNC: 135 MMOL/L — SIGNIFICANT CHANGE UP (ref 135–145)
WBC # BLD: 10.68 K/UL — HIGH (ref 3.8–10.5)
WBC # FLD AUTO: 10.68 K/UL — HIGH (ref 3.8–10.5)

## 2020-05-04 PROCEDURE — 99233 SBSQ HOSP IP/OBS HIGH 50: CPT

## 2020-05-04 RX ORDER — CALCITONIN SALMON 200 [IU]/ML
380 INJECTION, SOLUTION INTRAMUSCULAR EVERY 12 HOURS
Refills: 0 | Status: COMPLETED | OUTPATIENT
Start: 2020-05-04 | End: 2020-05-06

## 2020-05-04 RX ORDER — POTASSIUM CHLORIDE 20 MEQ
20 PACKET (EA) ORAL
Refills: 0 | Status: DISCONTINUED | OUTPATIENT
Start: 2020-05-04 | End: 2020-05-07

## 2020-05-04 RX ORDER — MAGNESIUM SULFATE 500 MG/ML
2 VIAL (ML) INJECTION ONCE
Refills: 0 | Status: COMPLETED | OUTPATIENT
Start: 2020-05-04 | End: 2020-05-04

## 2020-05-04 RX ADMIN — Medication 975 MILLIGRAM(S): at 12:21

## 2020-05-04 RX ADMIN — HEPARIN SODIUM 5000 UNIT(S): 5000 INJECTION INTRAVENOUS; SUBCUTANEOUS at 21:17

## 2020-05-04 RX ADMIN — Medication 1 TABLET(S): at 11:53

## 2020-05-04 RX ADMIN — Medication 1000 MILLIGRAM(S): at 17:29

## 2020-05-04 RX ADMIN — HEPARIN SODIUM 5000 UNIT(S): 5000 INJECTION INTRAVENOUS; SUBCUTANEOUS at 13:03

## 2020-05-04 RX ADMIN — DEXTROSE MONOHYDRATE, SODIUM CHLORIDE, AND POTASSIUM CHLORIDE 100 MILLILITER(S): 50; .745; 4.5 INJECTION, SOLUTION INTRAVENOUS at 12:22

## 2020-05-04 RX ADMIN — DEXTROSE MONOHYDRATE, SODIUM CHLORIDE, AND POTASSIUM CHLORIDE 100 MILLILITER(S): 50; .745; 4.5 INJECTION, SOLUTION INTRAVENOUS at 11:49

## 2020-05-04 RX ADMIN — CALCITONIN SALMON 380 INTERNATIONAL UNIT(S): 200 INJECTION, SOLUTION INTRAMUSCULAR at 18:04

## 2020-05-04 RX ADMIN — HEPARIN SODIUM 5000 UNIT(S): 5000 INJECTION INTRAVENOUS; SUBCUTANEOUS at 06:49

## 2020-05-04 RX ADMIN — Medication 325 MILLIGRAM(S): at 11:52

## 2020-05-04 RX ADMIN — Medication 50 GRAM(S): at 12:21

## 2020-05-04 RX ADMIN — Medication 20 MILLIEQUIVALENT(S): at 17:29

## 2020-05-04 RX ADMIN — POLYETHYLENE GLYCOL 3350 17 GRAM(S): 17 POWDER, FOR SOLUTION ORAL at 12:21

## 2020-05-04 RX ADMIN — ONDANSETRON 4 MILLIGRAM(S): 8 TABLET, FILM COATED ORAL at 19:50

## 2020-05-04 NOTE — PROGRESS NOTE ADULT - SUBJECTIVE AND OBJECTIVE BOX
[facility] DIVISION OF KIDNEY DISEASES AND HYPERTENSION -- FOLLOW UP NOTE  --------------------------------------------------------------------------------  Chief Complaint:    24 hour events/subjective:        PAST HISTORY  --------------------------------------------------------------------------------  No significant changes to PMH, PSH, FHx, SHx, unless otherwise noted    ALLERGIES & MEDICATIONS  --------------------------------------------------------------------------------  Allergies    No Known Allergies    Intolerances      Standing Inpatient Medications  ascorbic acid 1000 milliGRAM(s) Oral daily  calcitonin Injectable 380 International Unit(s) IntraMuscular every 12 hours  ferrous    sulfate 325 milliGRAM(s) Oral daily  heparin   Injectable 5000 Unit(s) SubCutaneous every 8 hours  magnesium sulfate  IVPB 2 Gram(s) IV Intermittent once  polyethylene glycol 3350 17 Gram(s) Oral daily  prenatal multivitamin 1 Tablet(s) Oral daily  sodium chloride 0.9% with potassium chloride 40 mEq/L 1000 milliLiter(s) IV Continuous <Continuous>    PRN Inpatient Medications  acetaminophen   Tablet .. 975 milliGRAM(s) Oral every 6 hours PRN  ondansetron Injectable 4 milliGRAM(s) IV Push every 6 hours PRN      REVIEW OF SYSTEMS  --------------------------------------------------------------------------------  Gen: No weight changes, fatigue, fevers/chills, weakness  Skin: No rashes  Head/Eyes/Ears/Mouth: No headache; Normal hearing; Normal vision w/o blurriness; No sinus pain/discomfort, sore throat  Respiratory: No dyspnea, cough, wheezing, hemoptysis  CV: No chest pain, PND, orthopnea  GI: No abdominal pain, diarrhea, constipation, nausea, vomiting, melena, hematochezia  : No increased frequency, dysuria, hematuria, nocturia  MSK: No joint pain/swelling; no back pain; no edema  Neuro: No dizziness/lightheadedness, weakness, seizures, numbness, tingling  Heme: No easy bruising or bleeding  Endo: No heat/cold intolerance  Psych: No significant nervousness, anxiety, stress, depression    All other systems were reviewed and are negative, except as noted.    VITALS/PHYSICAL EXAM  --------------------------------------------------------------------------------  T(C): 36.7 (05-04-20 @ 00:45), Max: 36.8 (05-03-20 @ 16:03)  HR: 75 (05-04-20 @ 00:45) (75 - 88)  BP: 119/76 (05-04-20 @ 00:45) (115/73 - 119/76)  RR: 19 (05-04-20 @ 00:45) (18 - 19)  SpO2: 97% (05-04-20 @ 00:45) (97% - 98%)  Wt(kg): --        Physical Exam:  	Gen: NAD, well-appearing  	HEENT: PERRL, supple neck, clear oropharynx  	Pulm: CTA B/L  	CV: RRR, S1S2; no rub  	Back: No spinal or CVA tenderness; no sacral edema  	Abd: +BS, soft, nontender/nondistended  	: No suprapubic tenderness  	UE: Warm, FROM, no clubbing, intact strength; no edema; no asterixis  	LE: Warm, FROM, no clubbing, intact strength; no edema  	Neuro: No focal deficits, intact gait  	Psych: Normal affect and mood  	Skin: Warm, without rashes  	Vascular access:    LABS/STUDIES  --------------------------------------------------------------------------------              10.5   10.68 >-----------<  284      [05-04-20 @ 08:11]              31.9     135  |  104  |  8.0  ----------------------------<  87      [05-04-20 @ 05:49]  4.0   |  19.0  |  0.44        Ca     13.8     [05-04-20 @ 05:49]      Mg     1.6     [05-04-20 @ 05:49]    TPro  7.0  /  Alb  3.5  /  TBili  <0.2  /  DBili  x   /  AST  19  /  ALT  16  /  AlkPhos  96  [05-04-20 @ 05:49]          Creatinine Trend:  SCr 0.44 [05-04 @ 05:49]  SCr 0.46 [05-03 @ 06:45]  SCr 0.50 [05-02 @ 06:08]  SCr 0.47 [04-30 @ 10:10]  SCr 0.46 [04-29 @ 06:41]    Urinalysis - [04-28-20 @ 22:15]      Color Yellow / Appearance Clear / SG 1.010 / pH 7.0      Gluc Negative / Ketone Negative  / Bili Negative / Urobili Negative       Blood Negative / Protein Negative / Leuk Est Small / Nitrite Negative      RBC Negative / WBC 0-2 / Hyaline  / Gran  / Sq Epi  / Non Sq Epi Occasional / Bacteria     Urine Sodium 55      [04-28-20 @ 22:13]  Urine Urea Nitrogen 118      [04-29-20 @ 00:58]  Urine Potassium 12      [04-28-20 @ 22:13]  Urine Chloride 48      [04-28-20 @ 22:13]  Urine Phosphorus 9.3      [04-29-20 @ 00:58]    Iron 26, TIBC 420, %sat 6      [04-28-20 @ 18:11]  Ferritin 87      [04-28-20 @ 18:11]  PTH -- (Ca 12.9)      [04-29-20 @ 01:20]   76  Vitamin D (25OH) 13.1      [04-29-20 @ 01:20]  TSH 0.65      [04-29-20 @ 06:41]      Free Light Chains: kappa 1.41, lambda 1.92, ratio = 0.73      [04-30 @ 21:36]  SPEP Interpretation: Normal Electrophoresis Pattern      [04-30-20 @ 21:36] [facility] DIVISION OF KIDNEY DISEASES AND HYPERTENSION -- FOLLOW UP NOTE  --------------------------------------------------------------------------------  Chief Complaint: hypercalcemia, hypokalemia, hypomagnesemia    24 hour events/subjective:  no acute event  pt seen and examined; feels well        PAST HISTORY  --------------------------------------------------------------------------------  No significant changes to PMH, PSH, FHx, SHx, unless otherwise noted    ALLERGIES & MEDICATIONS  --------------------------------------------------------------------------------  Allergies    No Known Allergies    Intolerances      Standing Inpatient Medications  ascorbic acid 1000 milliGRAM(s) Oral daily  calcitonin Injectable 380 International Unit(s) IntraMuscular every 12 hours  ferrous    sulfate 325 milliGRAM(s) Oral daily  heparin   Injectable 5000 Unit(s) SubCutaneous every 8 hours  magnesium sulfate  IVPB 2 Gram(s) IV Intermittent once  polyethylene glycol 3350 17 Gram(s) Oral daily  prenatal multivitamin 1 Tablet(s) Oral daily  sodium chloride 0.9% with potassium chloride 40 mEq/L 1000 milliLiter(s) IV Continuous <Continuous>    PRN Inpatient Medications  acetaminophen   Tablet .. 975 milliGRAM(s) Oral every 6 hours PRN  ondansetron Injectable 4 milliGRAM(s) IV Push every 6 hours PRN      REVIEW OF SYSTEMS  --------------------------------------------------------------------------------  Gen: No weight changes, fatigue, fevers/chills, weakness  Skin: No rashes  Head/Eyes/Ears/Mouth: No headache; Normal hearing; Normal vision w/o blurriness; No sinus pain/discomfort, sore throat  Respiratory: No dyspnea, cough, wheezing, hemoptysis  CV: No chest pain, PND, orthopnea  GI: No abdominal pain, diarrhea, constipation, nausea, vomiting, melena, hematochezia  : No increased frequency, dysuria, hematuria, nocturia  MSK: No joint pain/swelling; no back pain; no edema  Neuro: No dizziness/lightheadedness, weakness, seizures, numbness, tingling  Heme: No easy bruising or bleeding  Endo: No heat/cold intolerance  Psych: No significant nervousness, anxiety, stress, depression    All other systems were reviewed and are negative, except as noted.    VITALS/PHYSICAL EXAM  --------------------------------------------------------------------------------  T(C): 36.7 (05-04-20 @ 00:45), Max: 36.8 (05-03-20 @ 16:03)  HR: 75 (05-04-20 @ 00:45) (75 - 88)  BP: 119/76 (05-04-20 @ 00:45) (115/73 - 119/76)  RR: 19 (05-04-20 @ 00:45) (18 - 19)  SpO2: 97% (05-04-20 @ 00:45) (97% - 98%)  Wt(kg): --        Physical Exam:  	Gen: NAD, well-appearing  	HEENT:supple neck  	Pulm: CTA B/L  	CV: RRR, S1S2; no rub  	Back: No spinal or CVA tenderness; no sacral edema  	Abd: gravid abdomen+  	: No suprapubic tenderness  	UE: Warm, no edema;   	LE: Warm,  no edema  	Neuro: No focal deficits  	Psych: Normal affect and mood  	Skin: Warm, without rashes  	Vascular access:    LABS/STUDIES  --------------------------------------------------------------------------------              10.5   10.68 >-----------<  284      [05-04-20 @ 08:11]              31.9     135  |  104  |  8.0  ----------------------------<  87      [05-04-20 @ 05:49]  4.0   |  19.0  |  0.44        Ca     13.8     [05-04-20 @ 05:49]      Mg     1.6     [05-04-20 @ 05:49]    TPro  7.0  /  Alb  3.5  /  TBili  <0.2  /  DBili  x   /  AST  19  /  ALT  16  /  AlkPhos  96  [05-04-20 @ 05:49]          Creatinine Trend:  SCr 0.44 [05-04 @ 05:49]  SCr 0.46 [05-03 @ 06:45]  SCr 0.50 [05-02 @ 06:08]  SCr 0.47 [04-30 @ 10:10]  SCr 0.46 [04-29 @ 06:41]    Urinalysis - [04-28-20 @ 22:15]      Color Yellow / Appearance Clear / SG 1.010 / pH 7.0      Gluc Negative / Ketone Negative  / Bili Negative / Urobili Negative       Blood Negative / Protein Negative / Leuk Est Small / Nitrite Negative      RBC Negative / WBC 0-2 / Hyaline  / Gran  / Sq Epi  / Non Sq Epi Occasional / Bacteria     Urine Sodium 55      [04-28-20 @ 22:13]  Urine Urea Nitrogen 118      [04-29-20 @ 00:58]  Urine Potassium 12      [04-28-20 @ 22:13]  Urine Chloride 48      [04-28-20 @ 22:13]  Urine Phosphorus 9.3      [04-29-20 @ 00:58]    Iron 26, TIBC 420, %sat 6      [04-28-20 @ 18:11]  Ferritin 87      [04-28-20 @ 18:11]  PTH -- (Ca 12.9)      [04-29-20 @ 01:20]   76  Vitamin D (25OH) 13.1      [04-29-20 @ 01:20]  TSH 0.65      [04-29-20 @ 06:41]      Free Light Chains: kappa 1.41, lambda 1.92, ratio = 0.73      [04-30 @ 21:36]  SPEP Interpretation: Normal Electrophoresis Pattern      [04-30-20 @ 21:36]

## 2020-05-04 NOTE — PROGRESS NOTE ADULT - ASSESSMENT
1. Syncope  2. Multiple electrolyte imbalances- hypokalemia, hypomagnesemia and hypercalcemia  3. Primary Hyperparathyroidism    34 year old pregnant woman (; 14 weeks gestation)  with syncopal episode.   elevated iPTH, low phos and low 25-OH Vitamin D-  Also with high 1-25(OH)2 vitamin D likely driven by hypophosphatemia (increased 1 alpha hydroxylase activity)- CXR clear; ACE and LDH pending   High urinary calcium levels;  PHPT with coexisting vitamin D def  Neck US negative for parathyroid adenoma; will need MRI neck  Endocrine surgery consult for exploration vs adenectomy  Will give Calcitonin 4 doses- cleared by MFM  IV mag to keep Mg++ > 2  Replete K+ 1. Syncope  2. Multiple electrolyte imbalances- hypokalemia, hypomagnesemia and hypercalcemia  3. Primary Hyperparathyroidism    34 year old pregnant woman (; 14 weeks gestation)  with syncopal episode.   elevated iPTH, low phos and low 25-OH Vitamin D-  Also with high 1-25(OH)2 vitamin D likely driven by hypophosphatemia (increased 1 alpha hydroxylase activity)- CXR clear; ACE and LDH pending   High urinary calcium levels;  PHPT with coexisting vitamin D def  Neck US negative for parathyroid adenoma; will need MRI neck  Endocrine surgery consult for exploration vs adenectomy  Will give Calcitonin 4 doses- cleared by M  IV mag to keep Mg++ > 2  d/c IV hydration, start KCl 20 meq bid  Monitor lytes

## 2020-05-04 NOTE — PROGRESS NOTE ADULT - ASSESSMENT
33 yo F 14 weeks pregnant ( per her GYN attending ) , reported recent COVID positive test admittted for syncopal episode , weakness found to have K 2.7 , hypomagnesemia and hypercalcemia , reported recent covid 19 positive infection     1- Syncope likely due to electrolyte abnormality of primary hyperparathyroidism  TTE no structural abnormalities.  EF 55-60%   cardiology - arrhythmia likely from electrolyte abnormality.  Cardiac monitor. Outpatient .  Eventual 21 days outpatient [facility] monitoring   Leg dopplers no DVTs  . D-dimer 155     2-Primary hyperparathyoidism with Hypokalemia / hypomagnesemia / hypophosphatemia/ Hypercalcemia  cont to replace lytes to keep K 4 and over , mg over 1.8/2   Renal is following. High calcium is a concern. To obtain neck/thyroid MRI to rule out parathyroid adenoma .    Nephrology ordering calcitonin to bring down elevated serum calcium .  Discussed with OBGYN team and MFM dr Tera Suarez who is OK with calcitonin in the circumstance.   Gitelman syndrome unlikely due to low aldolase  cont iv fluid   ACE/LDH  Neck/Thyroid MRI for parathyroid adenoma hunt.     4- Anemia of iron deficiency and vit b12 low   Iron supplement   Received vit B12 injection     5- Recent COVID infection positive 3/26 per her gyn attending outpatient records   repeat neg on 4/22 and 4/29     Cyesis -14 weeks pregnancy   Dr Pepe OBGYN said -he does not recommend any gyn testing Son[facility] at present   OBGYN not coming  to [facility] in patient due to COVID rules regulations he states   call as needed     Disp:  Nephrology recommend calcitonin X4 doses to hopefully achieve elevated serum calcium abatement.  MFM confirm as not likely toxic fetuses and may be employed.     LDH, ACE levels. Neck MRI to hunt for parathyroid adenoma.  Nephrology dr Christiansen consulting endocrinology surgery for possible resection or adenectomy of parathyroid .

## 2020-05-04 NOTE — PROGRESS NOTE ADULT - SUBJECTIVE AND OBJECTIVE BOX
CC: Primary hyperparathyroidism with calcium wasting, multiple electrolyte abnormalities, weakness, dizziness and syncope. Early pregnancy 14 weeks.   HPI:  35 y/o F pt  currently 14 weeks pregnant presenting today after a syncopal episode while at the grocery store. Pt states that she was standing in line when she began to feel weak and her vision slowly faded and then passed out and she woke up on the ground. Pt states she hit her head, is currently c/o headache   Pt denies any sick contact ,no leg pain , no vision problems , she admits feeling weak for few weeks , also has nausea , appetite is fair  Denies any leg pain, leg swelling. Pt denies any chest pain, dyspnea, fevers, abdominal pain, dysuria, cough, congestion, sore throat, neck pain, weakness, numbness, tingling, or other complaint. Pt was seen and evaluated by OB and cleared prior to presenting to the ED.  Labs result in the noted to have K 2.7 , high calcium admitted for severe electrolyte depletion (2020 16:15)    REVIEW OF SYSTEMS:    Patient denied fever, chills, abdominal pain, nausea, vomiting, cough, shortness of breath, chest pain or palpitations    Vital Signs Last 24 Hrs  T(C): 36.7 (04 May 2020 00:45), Max: 36.8 (03 May 2020 16:03)  T(F): 98 (04 May 2020 00:45), Max: 98.3 (03 May 2020 16:03)  HR: 75 (04 May 2020 00:45) (75 - 88)  BP: 119/76 (04 May 2020 00:45) (115/73 - 119/76)  BP(mean): --  RR: 19 (04 May 2020 00:45) (18 - 19)  SpO2: 97% (04 May 2020 00:45) (97% - 98%)I&O's Summary    PHYSICAL EXAM:  GENERAL: NAD, well-groomed  HEENT: PERRL, +EOMI, anicteric, no [place]  NECK: Supple, No JVD   CHEST/LUNG: CTA bilaterally; Normal effort  HEART: S1S2 Normal intensity, no murmurs, gallops or rubs noted  ABDOMEN: Soft, BS Normoactive, NT, ND, no HSM noted  EXTREMITIES:  2+ radial and DP pulses noted, no clubbing, cyanosis, or edema noted, FROM x 4  SKIN: No rashes or lesions noted  NEURO: A&Ox3, no focal deficits noted, CN II-XII intact  PSYCH: normal mood and affect; insight/judgement appropriate  LABS:                        10.5   10.68 )-----------( 284      ( 04 May 2020 08:11 )             31.9     05-04    135  |  104  |  8.0  ----------------------------<  87  4.0   |  19.0<L>  |  0.44<L>    Ca    13.8<HH>      04 May 2020 05:49  Mg     1.6     -    TPro  7.0  /  Alb  3.5  /  TBili  <0.2<L>  /  DBili  x   /  AST  19  /  ALT  16  /  AlkPhos  96  -        RADIOLOGY & ADDITIONAL TESTS:    MEDICATIONS:  MEDICATIONS  (STANDING):  ascorbic acid 1000 milliGRAM(s) Oral daily  calcitonin Injectable 380 International Unit(s) IntraMuscular every 12 hours  ferrous    sulfate 325 milliGRAM(s) Oral daily  heparin   Injectable 5000 Unit(s) SubCutaneous every 8 hours  polyethylene glycol 3350 17 Gram(s) Oral daily  prenatal multivitamin 1 Tablet(s) Oral daily  sodium chloride 0.9% with potassium chloride 40 mEq/L 1000 milliLiter(s) (100 mL/Hr) IV Continuous <Continuous>    MEDICATIONS  (PRN):  acetaminophen   Tablet .. 975 milliGRAM(s) Oral every 6 hours PRN Severe Pain (7 - 10)  ondansetron Injectable 4 milliGRAM(s) IV Push every 6 hours PRN Nausea and/or Vomiting

## 2020-05-05 LAB
ALBUMIN SERPL ELPH-MCNC: 3.8 G/DL — SIGNIFICANT CHANGE UP (ref 3.3–5.2)
ALP SERPL-CCNC: 105 U/L — SIGNIFICANT CHANGE UP (ref 40–120)
ALT FLD-CCNC: 15 U/L — SIGNIFICANT CHANGE UP
ANION GAP SERPL CALC-SCNC: 13 MMOL/L — SIGNIFICANT CHANGE UP (ref 5–17)
AST SERPL-CCNC: 16 U/L — SIGNIFICANT CHANGE UP
BILIRUB SERPL-MCNC: 0.2 MG/DL — LOW (ref 0.4–2)
BUN SERPL-MCNC: 7 MG/DL — LOW (ref 8–20)
CALCIUM SERPL-MCNC: 12.4 MG/DL — HIGH (ref 8.6–10.2)
CHLORIDE SERPL-SCNC: 101 MMOL/L — SIGNIFICANT CHANGE UP (ref 98–107)
CO2 SERPL-SCNC: 21 MMOL/L — LOW (ref 22–29)
CREAT SERPL-MCNC: 0.41 MG/DL — LOW (ref 0.5–1.3)
GLUCOSE SERPL-MCNC: 99 MG/DL — SIGNIFICANT CHANGE UP (ref 70–99)
HCT VFR BLD CALC: 32.1 % — LOW (ref 34.5–45)
HGB BLD-MCNC: 10.7 G/DL — LOW (ref 11.5–15.5)
MAGNESIUM SERPL-MCNC: 1.4 MG/DL — LOW (ref 1.6–2.6)
MCHC RBC-ENTMCNC: 27.6 PG — SIGNIFICANT CHANGE UP (ref 27–34)
MCHC RBC-ENTMCNC: 33.3 GM/DL — SIGNIFICANT CHANGE UP (ref 32–36)
MCV RBC AUTO: 82.7 FL — SIGNIFICANT CHANGE UP (ref 80–100)
PHOSPHATE SERPL-MCNC: 2 MG/DL — LOW (ref 2.4–4.7)
PLATELET # BLD AUTO: 291 K/UL — SIGNIFICANT CHANGE UP (ref 150–400)
POTASSIUM SERPL-MCNC: 3.8 MMOL/L — SIGNIFICANT CHANGE UP (ref 3.5–5.3)
POTASSIUM SERPL-SCNC: 3.8 MMOL/L — SIGNIFICANT CHANGE UP (ref 3.5–5.3)
PROT SERPL-MCNC: 7.4 G/DL — SIGNIFICANT CHANGE UP (ref 6.6–8.7)
RBC # BLD: 3.88 M/UL — SIGNIFICANT CHANGE UP (ref 3.8–5.2)
RBC # FLD: 14.2 % — SIGNIFICANT CHANGE UP (ref 10.3–14.5)
RENIN PLAS-CCNC: 3.5 NG/ML/HR — SIGNIFICANT CHANGE UP (ref 0.17–5.38)
SODIUM SERPL-SCNC: 135 MMOL/L — SIGNIFICANT CHANGE UP (ref 135–145)
WBC # BLD: 11.96 K/UL — HIGH (ref 3.8–10.5)
WBC # FLD AUTO: 11.96 K/UL — HIGH (ref 3.8–10.5)

## 2020-05-05 PROCEDURE — 99233 SBSQ HOSP IP/OBS HIGH 50: CPT

## 2020-05-05 RX ORDER — MAGNESIUM SULFATE 500 MG/ML
2 VIAL (ML) INJECTION ONCE
Refills: 0 | Status: COMPLETED | OUTPATIENT
Start: 2020-05-05 | End: 2020-05-05

## 2020-05-05 RX ORDER — DEXTROSE MONOHYDRATE, SODIUM CHLORIDE, AND POTASSIUM CHLORIDE 50; .745; 4.5 G/1000ML; G/1000ML; G/1000ML
1000 INJECTION, SOLUTION INTRAVENOUS
Refills: 0 | Status: COMPLETED | OUTPATIENT
Start: 2020-05-05 | End: 2020-05-06

## 2020-05-05 RX ADMIN — DEXTROSE MONOHYDRATE, SODIUM CHLORIDE, AND POTASSIUM CHLORIDE 100 MILLILITER(S): 50; .745; 4.5 INJECTION, SOLUTION INTRAVENOUS at 18:19

## 2020-05-05 RX ADMIN — Medication 20 MILLIEQUIVALENT(S): at 18:08

## 2020-05-05 RX ADMIN — Medication 1000 MILLIGRAM(S): at 12:01

## 2020-05-05 RX ADMIN — Medication 325 MILLIGRAM(S): at 12:01

## 2020-05-05 RX ADMIN — HEPARIN SODIUM 5000 UNIT(S): 5000 INJECTION INTRAVENOUS; SUBCUTANEOUS at 12:11

## 2020-05-05 RX ADMIN — HEPARIN SODIUM 5000 UNIT(S): 5000 INJECTION INTRAVENOUS; SUBCUTANEOUS at 05:40

## 2020-05-05 RX ADMIN — Medication 50 GRAM(S): at 12:01

## 2020-05-05 RX ADMIN — ONDANSETRON 4 MILLIGRAM(S): 8 TABLET, FILM COATED ORAL at 19:25

## 2020-05-05 RX ADMIN — CALCITONIN SALMON 380 INTERNATIONAL UNIT(S): 200 INJECTION, SOLUTION INTRAMUSCULAR at 18:08

## 2020-05-05 RX ADMIN — POLYETHYLENE GLYCOL 3350 17 GRAM(S): 17 POWDER, FOR SOLUTION ORAL at 12:01

## 2020-05-05 RX ADMIN — Medication 20 MILLIEQUIVALENT(S): at 05:40

## 2020-05-05 RX ADMIN — HEPARIN SODIUM 5000 UNIT(S): 5000 INJECTION INTRAVENOUS; SUBCUTANEOUS at 21:16

## 2020-05-05 RX ADMIN — Medication 1 TABLET(S): at 12:01

## 2020-05-05 RX ADMIN — ONDANSETRON 4 MILLIGRAM(S): 8 TABLET, FILM COATED ORAL at 08:37

## 2020-05-05 RX ADMIN — Medication 975 MILLIGRAM(S): at 15:09

## 2020-05-05 RX ADMIN — CALCITONIN SALMON 380 INTERNATIONAL UNIT(S): 200 INJECTION, SOLUTION INTRAMUSCULAR at 05:40

## 2020-05-05 NOTE — PROGRESS NOTE ADULT - SUBJECTIVE AND OBJECTIVE BOX
Vital Signs Last 24 Hrs,    T(C): 36.5 (05 May 2020 08:07), Max: 36.9 (05 May 2020 05:25)  T(F): 97.7 (05 May 2020 08:07), Max: 98.5 (05 May 2020 05:25)  HR: 91 (05 May 2020 08:07) (79 - 97)  BP: 112/70 (05 May 2020 08:07) (99/62 - 112/70)  RR: 18 (05 May 2020 08:07) (18 - 18)  SpO2: 95% (05 May 2020 08:07) (95% - 99%)    135    |  101    |  7.0<L>  ----------------------------<  99     Ca:12.4<H> (05 May 2020 06:37)  3.8     |  21.0<L>  |  0.41<L>    eGFR if Non : 135  eGFR if : 156    TPro  7.4    /  Alb  3.8    /  TBili  0.2<L>  /  DBili  x      /  AST  16     /  ALT  15     /  AlkPhos  105    05 May 2020 06:37                               10.7<L>  11.96<H> )-----------( 291      ( 05 May 2020 06:40 )                    32.1<L>    Phos:2.0 mg/dL<L> M.4 mg/dL<L>    Chief Complaint: hypercalcemia, hypokalemia, hypomagnesemia    24 hour events/subjective:    no acute event  pt seen and examined; feels well    PAST HISTORY  --------------------------------------------------------------------------------  No significant changes to PMH, PSH, FHx, SHx, unless otherwise noted    ALLERGIES & MEDICATIONS  --------------------------------------------------------------------------------  Allergies    No Known Allergies    Standing Inpatient Medications  ascorbic acid 1000 milliGRAM(s) Oral daily  calcitonin Injectable 380 International Unit(s) IntraMuscular every 12 hours  ferrous    sulfate 325 milliGRAM(s) Oral daily  heparin   Injectable 5000 Unit(s) SubCutaneous every 8 hours  magnesium sulfate  IVPB 2 Gram(s) IV Intermittent once  polyethylene glycol 3350 17 Gram(s) Oral daily  prenatal multivitamin 1 Tablet(s) Oral daily  sodium chloride 0.9% with potassium chloride 40 mEq/L 1000 milliLiter(s) IV Continuous <Continuous>    PRN Inpatient Medications  acetaminophen   Tablet .. 975 milliGRAM(s) Oral every 6 hours PRN  ondansetron Injectable 4 milliGRAM(s) IV Push every 6 hours PRN    REVIEW OF SYSTEMS  --------------------------------------------------------------------------------  Gen: No weight changes, fatigue, fevers/chills, weakness  Skin: No rashes  Head/Eyes/Ears/Mouth: No headache; Normal hearing; Normal vision w/o blurriness; No sinus pain/discomfort, sore throat  Respiratory: No dyspnea, cough, wheezing, hemoptysis  CV: No chest pain, PND, orthopnea  GI: No abdominal pain, diarrhea, constipation, nausea, vomiting, melena, hematochezia  : No increased frequency, dysuria, hematuria, nocturia  MSK: No joint pain/swelling; no back pain; no edema  Neuro: No dizziness/lightheadedness, weakness, seizures, numbness, tingling  Heme: No easy bruising or bleeding  Endo: No heat/cold intolerance  Psych: No significant nervousness, anxiety, stress, depression    All other systems were reviewed and are negative, except as noted.    VITALS/PHYSICAL EXAM  --------------------------------------------------------------------------------  T(C): 36.7 (20 @ 00:45), Max: 36.8 (20 @ 16:03)  HR: 75 (20 00:45) (75 - 88)  BP: 119/76 (20 @ 00:45) (115/73 - 119/76)  RR: 19 (20 00:45) (18 - 19)  SpO2: 97% (20 @ 00:45) (97% - 98%)    Physical Exam:  	Gen: NAD, well-appearing  	HEENT:supple neck  	Pulm: CTA B/L  	CV: RRR, S1S2; no rub  	Back: No spinal or CVA tenderness; no sacral edema  	Abd: gravid abdomen+  	: No suprapubic tenderness  	UE: Warm, no edema;   	LE: Warm,  no edema  	Neuro: No focal deficits  	Psych: Normal affect and mood  	Skin: Warm, without rashes  	Vascular access:    LABS/STUDIES  --------------------------------------------------------------------------------              10.5   10.68 >-----------<  284      [20 @ 08:11]              31.9     135  |  104  |  8.0  ----------------------------<  87      [20 @ 05:49]  4.0   |  19.0  |  0.44        Ca     13.8     [20 @ 05:49]      Mg     1.6     [20 05:49]    TPro  7.0  /  Alb  3.5  /  TBili  <0.2  /  DBili  x   /  AST  19  /  ALT  16  /  AlkPhos  96  [20 @ 05:49]    Creatinine Trend:  SCr 0.44 [ 05:49]  SCr 0.46 [ 06:45]  SCr 0.50 [ 06:08]  SCr 0.47 [ 10:10]  SCr 0.46 [ 06:41]    Urinalysis - [20 @ 22:15]      Color Yellow / Appearance Clear / SG 1.010 / pH 7.0      Gluc Negative / Ketone Negative  / Bili Negative / Urobili Negative       Blood Negative / Protein Negative / Leuk Est Small / Nitrite Negative      RBC Negative / WBC 0-2 / Hyaline  / Gran  / Sq Epi  / Non Sq Epi Occasional / Bacteria     Urine Sodium 55      [20 @ 22:13]  Urine Urea Nitrogen 118      [20 @ 00:58]  Urine Potassium 12      [20 22:13]  Urine Chloride 48      [20 22:13]  Urine Phosphorus 9.3      [20 00:58]    Iron 26, TIBC 420, %sat 6      [20 @ 18:11]  Ferritin 87      [20 18:11]  PTH -- (Ca 12.9)      [20 01:20]   76  Vitamin D (25OH) 13.1      [20 01:20]  TSH 0.65      [20 06:41]    Free Light Chains: kappa 1.41, lambda 1.92, ratio = 0.73      [ 21:36]  SPEP Interpretation: Normal Electrophoresis Pattern      [20 21:36]

## 2020-05-05 NOTE — PROCEDURE NOTE - NSICDXPROCEDURE_GEN_ALL_CORE_FT
PROCEDURES:  Ultrasound guided venous access 05-May-2020 14:51:44  Lela Fountain  Insertion, catheter, intravenous 05-May-2020 14:51:26  Lela Fountain

## 2020-05-05 NOTE — PROGRESS NOTE ADULT - SUBJECTIVE AND OBJECTIVE BOX
CC:   HPI:  35 y/o F pt  currently 14 weeks pregnant presenting today after a syncopal episode while at the grocery store. Pt states that she was standing in line when she began to feel weak and her vision slowly faded and then passed out and she woke up on the ground. Pt states she hit her head, is currently c/o headache   Pt denies any sick contact ,no leg pain , no vision problems , she admits feeling weak for few weeks , also has nausea , appetite is fair  Denies any leg pain, leg swelling. Pt denies any chest pain, dyspnea, fevers, abdominal pain, dysuria, cough, congestion, sore throat, neck pain, weakness, numbness, tingling, or other complaint. Pt was seen and evaluated by OB and cleared prior to presenting to the ED.  Labs result in the noted to have K 2.7 , high calcium admitted for severe electrolyte depletion (2020 16:15)    REVIEW OF SYSTEMS:    Patient denied fever, chills, abdominal pain, nausea, vomiting, cough, shortness of breath, chest pain or palpitations    Vital Signs Last 24 Hrs  T(C): 36.5 (05 May 2020 08:07), Max: 36.9 (05 May 2020 05:25)  T(F): 97.7 (05 May 2020 08:07), Max: 98.5 (05 May 2020 05:25)  HR: 91 (05 May 2020 08:07) (79 - 97)  BP: 112/70 (05 May 2020 08:07) (99/62 - 112/70)  BP(mean): --  RR: 18 (05 May 2020 08:07) (18 - 18)  SpO2: 95% (05 May 2020 08:07) (95% - 99%)I&O's Summary    PHYSICAL EXAM:  GENERAL: NAD, well-groomed  HEENT: PERRL, +EOMI, anicteric, no [place]  NECK: Supple, No JVD   CHEST/LUNG: CTA bilaterally; Normal effort  HEART: S1S2 Normal intensity, no murmurs, gallops or rubs noted  ABDOMEN: Soft, BS Normoactive, NT, ND, no HSM noted  EXTREMITIES:  2+ radial and DP pulses noted, no clubbing, cyanosis, or edema noted, FROM x 4  SKIN: No rashes or lesions noted  NEURO: A&Ox3, no focal deficits noted, CN II-XII intact  PSYCH: normal mood and affect; insight/judgement appropriate  LABS:                        10.7   11.96 )-----------( 291      ( 05 May 2020 06:40 )             32.1     05-    135  |  101  |  7.0<L>  ----------------------------<  99  3.8   |  21.0<L>  |  0.41<L>    Ca    12.4<H>      05 May 2020 06:37  Phos  2.0     05-  Mg     1.4         TPro  7.4  /  Alb  3.8  /  TBili  0.2<L>  /  DBili  x   /  AST  16  /  ALT  15  /  AlkPhos  105  -        RADIOLOGY & ADDITIONAL TESTS:    MEDICATIONS:  MEDICATIONS  (STANDING):  ascorbic acid 1000 milliGRAM(s) Oral daily  calcitonin Injectable 380 International Unit(s) IntraMuscular every 12 hours  ferrous    sulfate 325 milliGRAM(s) Oral daily  heparin   Injectable 5000 Unit(s) SubCutaneous every 8 hours  polyethylene glycol 3350 17 Gram(s) Oral daily  potassium chloride    Tablet ER 20 milliEquivalent(s) Oral two times a day  prenatal multivitamin 1 Tablet(s) Oral daily    MEDICATIONS  (PRN):  acetaminophen   Tablet .. 975 milliGRAM(s) Oral every 6 hours PRN Severe Pain (7 - 10)  ondansetron Injectable 4 milliGRAM(s) IV Push every 6 hours PRN Nausea and/or Vomiting

## 2020-05-05 NOTE — DIETITIAN INITIAL EVALUATION ADULT. - ADD RECOMMEND
Continue with current nutrition plan; Obtain/provide food preferences daily to inc PO; RD to remain available.

## 2020-05-05 NOTE — DIETITIAN INITIAL EVALUATION ADULT. - OTHER INFO
35yo female 14weeks pregnant, admitted syncopal episode, reports weakness x few weeks, recent COVID infection (2 negative tests since), c/o nausea and found with electrolyte abnormalities. Pt with good PO intake in house completing 100% of meals per documentation.

## 2020-05-05 NOTE — DIETITIAN INITIAL EVALUATION ADULT. - PERTINENT LABORATORY DATA
05-05 Na135 mmol/L Glu 99 mg/dL K+ 3.8 mmol/L Cr  0.41 mg/dL<L> BUN 7.0 mg/dL<L> Phos 2.0 mg/dL<L> Alb 3.8 g/dL PAB n/a

## 2020-05-05 NOTE — PROGRESS NOTE ADULT - ASSESSMENT
33 yo F 14 weeks pregnant ( per her GYN attending ) , reported recent COVID positive test admittted for syncopal episode , weakness found to have K 2.7 , hypomagnesemia and hypercalcemia , reported recent covid 19 positive infection     1- Syncope likely due to electrolyte abnormality of primary hyperparathyroidism   cardiology - arrhythmia likely from electrolyte abnormality.  Cardiac monitor. Outpatient .  Eventual 21 days outpatient [facility] monitoring   Leg dopplers no DVTs  . D-dimer 155     2-Primary hyperparathyoidism with Hypokalemia / hypomagnesemia / hypophosphatemia/ Hypercalcemia  continue to aggressively replace lytes to keep K 4 and over , mg over 1.8/2   Renal is following. High calcium is a concern. To obtain neck/thyroid MRI to rule out parathyroid adenoma .    calcitonin to bring down elevated serum calcium .  Discussed with OBGYN team and MFM dr Tera Suarez who is OK with calcitonin in the circumstance.   Gitelman syndrome unlikely due to low aldolase  cont iv fluid   ACE/LDH  Neck/Thyroid MRI for parathyroid adenoma hunt.     4- Anemia of iron deficiency and vit b12 low   Iron supplement   Received vit B12 injection     5- Recent COVID infection positive 3/26 per her gyn attending outpatient records   repeat neg on 4/22 and 4/29     Cyesis -14 weeks pregnancy   Dr Pepe OBGUDELIA said -he does not recommend any gyn testing Sono at present   OBGYN not coming  to [facility] in patient due to COVID rules regulations he states   call as needed     Disp:  Nephrology recommend calcitonin X4 doses to hopefully achieve elevated serum calcium abatement.  MFM confirm as not likely toxic fetuses and may be employed.     LDH, ACE levels. Neck MRI to hunt for parathyroid adenoma.  Nephrology dr Christiansen consulting endocrinology surgery for possible resection or adenectomy of parathyroid .

## 2020-05-05 NOTE — PROGRESS NOTE ADULT - ASSESSMENT
1. Syncope  2. Multiple electrolyte imbalances- hypokalemia, hypomagnesemia and hypercalcemia  3. Primary Hyperparathyroidism    34 year old pregnant woman (; 14 weeks gestation)  with syncopal episode.   elevated iPTH, low phos and low 25-OH Vitamin D-  Also with high 1-25(OH)2 vitamin D likely driven by hypophosphatemia (increased 1 alpha hydroxylase activity)- CXR clear; ACE and LDH pending   High urinary calcium levels;      PHPT with coexisting vitamin D def  Neck US negative for parathyroid adenoma; will need MRI neck  Endocrine surgery consult for exploration vs adenectomy  Will give Calcitonin 4 doses- cleared by M  IV mag to keep Mg++ > 2 mg.,   start KCl 20 meq bid  Monitor lytes, Ca++,

## 2020-05-06 ENCOUNTER — TRANSCRIPTION ENCOUNTER (OUTPATIENT)
Age: 35
End: 2020-05-06

## 2020-05-06 LAB
ACE SERPL-CCNC: 25 U/L — SIGNIFICANT CHANGE UP (ref 14–82)
ALBUMIN SERPL ELPH-MCNC: 3.8 G/DL — SIGNIFICANT CHANGE UP (ref 3.3–5.2)
ALBUMIN SERPL ELPH-MCNC: 4 G/DL — SIGNIFICANT CHANGE UP (ref 3.3–5.2)
ALDOST 24H UR-MCNC: SIGNIFICANT CHANGE UP
ALP SERPL-CCNC: 106 U/L — SIGNIFICANT CHANGE UP (ref 40–120)
ALP SERPL-CCNC: 113 U/L — SIGNIFICANT CHANGE UP (ref 40–120)
ALT FLD-CCNC: 13 U/L — SIGNIFICANT CHANGE UP
ALT FLD-CCNC: 14 U/L — SIGNIFICANT CHANGE UP
ANION GAP SERPL CALC-SCNC: 13 MMOL/L — SIGNIFICANT CHANGE UP (ref 5–17)
ANION GAP SERPL CALC-SCNC: 14 MMOL/L — SIGNIFICANT CHANGE UP (ref 5–17)
AST SERPL-CCNC: 15 U/L — SIGNIFICANT CHANGE UP
AST SERPL-CCNC: 15 U/L — SIGNIFICANT CHANGE UP
BILIRUB SERPL-MCNC: 0.3 MG/DL — LOW (ref 0.4–2)
BILIRUB SERPL-MCNC: 0.3 MG/DL — LOW (ref 0.4–2)
BUN SERPL-MCNC: 6 MG/DL — LOW (ref 8–20)
BUN SERPL-MCNC: 6 MG/DL — LOW (ref 8–20)
CALCIUM SERPL-MCNC: 13.3 MG/DL — CRITICAL HIGH (ref 8.6–10.2)
CALCIUM SERPL-MCNC: 13.5 MG/DL — CRITICAL HIGH (ref 8.6–10.2)
CHLORIDE SERPL-SCNC: 101 MMOL/L — SIGNIFICANT CHANGE UP (ref 98–107)
CHLORIDE SERPL-SCNC: 99 MMOL/L — SIGNIFICANT CHANGE UP (ref 98–107)
CO2 SERPL-SCNC: 20 MMOL/L — LOW (ref 22–29)
CO2 SERPL-SCNC: 20 MMOL/L — LOW (ref 22–29)
CREAT SERPL-MCNC: 0.42 MG/DL — LOW (ref 0.5–1.3)
CREAT SERPL-MCNC: 0.44 MG/DL — LOW (ref 0.5–1.3)
GLUCOSE SERPL-MCNC: 107 MG/DL — HIGH (ref 70–99)
GLUCOSE SERPL-MCNC: 97 MG/DL — SIGNIFICANT CHANGE UP (ref 70–99)
HCT VFR BLD CALC: 32.5 % — LOW (ref 34.5–45)
HGB BLD-MCNC: 10.8 G/DL — LOW (ref 11.5–15.5)
MAGNESIUM SERPL-MCNC: 1.5 MG/DL — LOW (ref 1.6–2.6)
MAGNESIUM SERPL-MCNC: 1.7 MG/DL — SIGNIFICANT CHANGE UP (ref 1.6–2.6)
MCHC RBC-ENTMCNC: 27.7 PG — SIGNIFICANT CHANGE UP (ref 27–34)
MCHC RBC-ENTMCNC: 33.2 GM/DL — SIGNIFICANT CHANGE UP (ref 32–36)
MCV RBC AUTO: 83.3 FL — SIGNIFICANT CHANGE UP (ref 80–100)
PHOSPHATE SERPL-MCNC: 1.8 MG/DL — LOW (ref 2.4–4.7)
PHOSPHATE SERPL-MCNC: 1.9 MG/DL — LOW (ref 2.4–4.7)
PLATELET # BLD AUTO: 308 K/UL — SIGNIFICANT CHANGE UP (ref 150–400)
POTASSIUM SERPL-MCNC: 3.5 MMOL/L — SIGNIFICANT CHANGE UP (ref 3.5–5.3)
POTASSIUM SERPL-MCNC: 3.6 MMOL/L — SIGNIFICANT CHANGE UP (ref 3.5–5.3)
POTASSIUM SERPL-SCNC: 3.5 MMOL/L — SIGNIFICANT CHANGE UP (ref 3.5–5.3)
POTASSIUM SERPL-SCNC: 3.6 MMOL/L — SIGNIFICANT CHANGE UP (ref 3.5–5.3)
PROT SERPL-MCNC: 7.4 G/DL — SIGNIFICANT CHANGE UP (ref 6.6–8.7)
PROT SERPL-MCNC: 7.7 G/DL — SIGNIFICANT CHANGE UP (ref 6.6–8.7)
RBC # BLD: 3.9 M/UL — SIGNIFICANT CHANGE UP (ref 3.8–5.2)
RBC # FLD: 14.3 % — SIGNIFICANT CHANGE UP (ref 10.3–14.5)
SODIUM SERPL-SCNC: 133 MMOL/L — LOW (ref 135–145)
SODIUM SERPL-SCNC: 134 MMOL/L — LOW (ref 135–145)
WBC # BLD: 11.37 K/UL — HIGH (ref 3.8–10.5)
WBC # FLD AUTO: 11.37 K/UL — HIGH (ref 3.8–10.5)

## 2020-05-06 PROCEDURE — 99233 SBSQ HOSP IP/OBS HIGH 50: CPT

## 2020-05-06 PROCEDURE — 70540 MRI ORBIT/FACE/NECK W/O DYE: CPT | Mod: 26

## 2020-05-06 PROCEDURE — 99451 NTRPROF PH1/NTRNET/EHR 5/>: CPT

## 2020-05-06 PROCEDURE — 93010 ELECTROCARDIOGRAM REPORT: CPT

## 2020-05-06 RX ORDER — DIPHENHYDRAMINE HCL 50 MG
25 CAPSULE ORAL EVERY 6 HOURS
Refills: 0 | Status: DISCONTINUED | OUTPATIENT
Start: 2020-05-06 | End: 2020-05-07

## 2020-05-06 RX ORDER — ACETAMINOPHEN 500 MG
1000 TABLET ORAL ONCE
Refills: 0 | Status: COMPLETED | OUTPATIENT
Start: 2020-05-06 | End: 2020-05-06

## 2020-05-06 RX ORDER — MAGNESIUM SULFATE 500 MG/ML
1 VIAL (ML) INJECTION ONCE
Refills: 0 | Status: DISCONTINUED | OUTPATIENT
Start: 2020-05-06 | End: 2020-05-06

## 2020-05-06 RX ORDER — CALCITONIN SALMON 200 [IU]/ML
380 INJECTION, SOLUTION INTRAMUSCULAR EVERY 12 HOURS
Refills: 0 | Status: DISCONTINUED | OUTPATIENT
Start: 2020-05-06 | End: 2020-05-06

## 2020-05-06 RX ORDER — LANOLIN ALCOHOL/MO/W.PET/CERES
5 CREAM (GRAM) TOPICAL AT BEDTIME
Refills: 0 | Status: COMPLETED | OUTPATIENT
Start: 2020-05-06 | End: 2020-05-06

## 2020-05-06 RX ORDER — SODIUM CHLORIDE 9 MG/ML
1000 INJECTION INTRAMUSCULAR; INTRAVENOUS; SUBCUTANEOUS
Refills: 0 | Status: DISCONTINUED | OUTPATIENT
Start: 2020-05-06 | End: 2020-05-07

## 2020-05-06 RX ORDER — DIPHENHYDRAMINE HCL 50 MG
25 CAPSULE ORAL EVERY 6 HOURS
Refills: 0 | Status: DISCONTINUED | OUTPATIENT
Start: 2020-05-06 | End: 2020-05-06

## 2020-05-06 RX ORDER — SODIUM,POTASSIUM PHOSPHATES 278-250MG
1 POWDER IN PACKET (EA) ORAL
Refills: 0 | Status: DISCONTINUED | OUTPATIENT
Start: 2020-05-06 | End: 2020-05-07

## 2020-05-06 RX ORDER — MAGNESIUM SULFATE 500 MG/ML
1 VIAL (ML) INJECTION EVERY 6 HOURS
Refills: 0 | Status: DISCONTINUED | OUTPATIENT
Start: 2020-05-06 | End: 2020-05-07

## 2020-05-06 RX ORDER — MAGNESIUM SULFATE 500 MG/ML
2 VIAL (ML) INJECTION ONCE
Refills: 0 | Status: COMPLETED | OUTPATIENT
Start: 2020-05-06 | End: 2020-05-06

## 2020-05-06 RX ADMIN — Medication 325 MILLIGRAM(S): at 13:13

## 2020-05-06 RX ADMIN — DEXTROSE MONOHYDRATE, SODIUM CHLORIDE, AND POTASSIUM CHLORIDE 100 MILLILITER(S): 50; .745; 4.5 INJECTION, SOLUTION INTRAVENOUS at 01:45

## 2020-05-06 RX ADMIN — HEPARIN SODIUM 5000 UNIT(S): 5000 INJECTION INTRAVENOUS; SUBCUTANEOUS at 22:05

## 2020-05-06 RX ADMIN — Medication 400 MILLIGRAM(S): at 17:35

## 2020-05-06 RX ADMIN — Medication 1 TABLET(S): at 22:05

## 2020-05-06 RX ADMIN — Medication 5 MILLIGRAM(S): at 22:05

## 2020-05-06 RX ADMIN — Medication 20 MILLIEQUIVALENT(S): at 05:25

## 2020-05-06 RX ADMIN — Medication 100 GRAM(S): at 17:35

## 2020-05-06 RX ADMIN — Medication 1000 MILLIGRAM(S): at 13:13

## 2020-05-06 RX ADMIN — ONDANSETRON 4 MILLIGRAM(S): 8 TABLET, FILM COATED ORAL at 09:39

## 2020-05-06 RX ADMIN — Medication 50 GRAM(S): at 02:26

## 2020-05-06 RX ADMIN — Medication 1 TABLET(S): at 17:36

## 2020-05-06 RX ADMIN — HEPARIN SODIUM 5000 UNIT(S): 5000 INJECTION INTRAVENOUS; SUBCUTANEOUS at 05:24

## 2020-05-06 RX ADMIN — Medication 100 GRAM(S): at 13:13

## 2020-05-06 RX ADMIN — Medication 1 TABLET(S): at 17:29

## 2020-05-06 RX ADMIN — CALCITONIN SALMON 380 INTERNATIONAL UNIT(S): 200 INJECTION, SOLUTION INTRAMUSCULAR at 05:23

## 2020-05-06 RX ADMIN — Medication 25 MILLIGRAM(S): at 09:38

## 2020-05-06 RX ADMIN — SODIUM CHLORIDE 100 MILLILITER(S): 9 INJECTION INTRAMUSCULAR; INTRAVENOUS; SUBCUTANEOUS at 17:39

## 2020-05-06 RX ADMIN — HEPARIN SODIUM 5000 UNIT(S): 5000 INJECTION INTRAVENOUS; SUBCUTANEOUS at 13:14

## 2020-05-06 RX ADMIN — POLYETHYLENE GLYCOL 3350 17 GRAM(S): 17 POWDER, FOR SOLUTION ORAL at 13:14

## 2020-05-06 RX ADMIN — Medication 1 TABLET(S): at 13:13

## 2020-05-06 RX ADMIN — Medication 20 MILLIEQUIVALENT(S): at 17:35

## 2020-05-06 NOTE — CONSULT NOTE ADULT - ASSESSMENT
Severe hypercalcemia due to primary hyperparathyroidism, with abnormal MRI suggesting adenoma on the left side. As pt. is in her second trimester of pregnancy, and there is no effective way for medical therapy to control the hypercalcemia, would recommend surgical consult urgently for parathyroid exploration.  High potential for post-op hypocalcemia due to severity of hypercalcemia and pregnant state with increasing bone calcium demands for pt. and fetus.

## 2020-05-06 NOTE — PROGRESS NOTE ADULT - ASSESSMENT
35 yo F 14 weeks pregnant ( per her GYN attending ) , reported recent COVID positive test admittted for syncopal episode , weakness found to have K 2.7 , hypomagnesemia and hypercalcemia , reported recent covid 19 positive infection     1- Syncope likely due to electrolyte abnormality of primary hyperparathyroidism   cardiology - arrhythmia likely from electrolyte abnormality.  Cardiac monitor. Outpatient .  Eventual 21 days outpatient [facility] monitoring   Leg dopplers no DVTs  . D-dimer 155     2-Primary hyperparathyoidism with Hypokalemia / hypomagnesemia / hypophosphatemia/ Hypercalcemia  continue to aggressively replace lytes to keep K 4 and over , mg over 1.8/2   High calcium is a concern. To obtain neck/thyroid MRI to rule out parathyroid adenoma .    started IM calcitonin to bring down elevated serum calcium .  Discussed with OBGYN team and M dr Tera Suarez who is OK with calcitonin in the circumstance.   Patient refusing further dose of calcitonin for nausea headache poor po intake and lethargy.     Gitelman syndrome unlikely due to low aldosterone,   cont iv fluid   ACE/LDH  Neck/Thyroid MRI for parathyroid adenoma hunt.   Called Endocrinology dr Fitch to assist with mgt     4- Anemia of iron deficiency and vit b12 low   Iron supplement   Received vit B12 injection     5- Recent COVID infection positive 3/26 per her gyn attending outpatient records   repeat neg on 4/22 and 4/29     Cyesis -14 weeks pregnancy   Dr Afshin RAYMOND said -he does not recommend any gyn testing Sono at present   OBGYN not coming  to [facility] in patient due to COVID rules regulations he states   call as needed     LDH, ACE levels. Neck MRI to hunt for parathyroid adenoma.      Endocrine  surgery for possible resection or adenectomy of parathyroid .

## 2020-05-06 NOTE — CHART NOTE - NSCHARTNOTEFT_GEN_A_CORE
Patient is a 33yo  at approximately 15 weeks gestation admitted with severe hypercalcemia secondary to primary hyperparathyroidism with MRI findings consistent with parathyroid adenoma left side. She is cleared for surgical management. If the surgical team could please call the OB team prior to the surgery and afterwards so that we can obtain fetal heart check, we will gladly assess the fetus.   Please consult further if any concerns. Will continue to follow her chart and hospital course. Thank you.   Dr. Armenta, PGY3 all of this discussed with Dr. Biggs [facility] attending who has cleared her for the necessary procedure.

## 2020-05-06 NOTE — CONSULT NOTE ADULT - SUBJECTIVE AND OBJECTIVE BOX
HPI:  33 y/o F pt  currently 14 weeks pregnant presenting today after a syncopal episode while at the grocery store. Pt states that she was standing in line when she began to feel weak and her vision slowly faded and then passed out and she woke up on the ground. Pt states she hit her head, is currently c/o headache   Pt denies any sick contact ,no leg pain , no vision problems , she admits feeling weak for few weeks , also has nausea , appetite is fair  Denies any leg pain, leg swelling. Pt denies any chest pain, dyspnea, fevers, abdominal pain, dysuria, cough, congestion, sore throat, neck pain, weakness, numbness, tingling, or other complaint. Pt was seen and evaluated by OB and cleared prior to presenting to the ED.  Labs result in the noted to have K 2.7 , high calcium admitted for severe electrolyte depletion (2020 16:15)  WOrkup to this point has revealed a PTH level of 75 associated with calcium of 13.5, high 1.25 vitamin D level, and absent pthrp. Neck ultrasound negative but MRi revealed a structure posterior to the left superior pole ot the thyroid gland suggestive of a parathyroid adenoma.  Treatment with calcitonin ineffective.      PAST MEDICAL & SURGICAL HISTORY:  No pertinent past medical history  S/P : x2      FAMILY HISTORY:      SOCIAL HISTORY:    MEDICATIONS  (STANDING):  ascorbic acid 1000 milliGRAM(s) Oral daily  ferrous    sulfate 325 milliGRAM(s) Oral daily  heparin   Injectable 5000 Unit(s) SubCutaneous every 8 hours  magnesium sulfate  IVPB 1 Gram(s) IV Intermittent every 6 hours  polyethylene glycol 3350 17 Gram(s) Oral daily  potassium acid phosphate/sodium acid phosphate tablet (K-PHOS No. 2) 1 Tablet(s) Oral four times a day with meals  potassium chloride    Tablet ER 20 milliEquivalent(s) Oral two times a day  prenatal multivitamin 1 Tablet(s) Oral daily  sodium chloride 0.9%. 1000 milliLiter(s) (100 mL/Hr) IV Continuous <Continuous>    MEDICATIONS  (PRN):  acetaminophen   Tablet .. 975 milliGRAM(s) Oral every 6 hours PRN Severe Pain (7 - 10)  diphenhydrAMINE   Injectable 25 milliGRAM(s) IV Push every 6 hours PRN Itching  ondansetron Injectable 4 milliGRAM(s) IV Push every 6 hours PRN Nausea and/or Vomiting      Allergies    No Known Allergies    Intolerances          PHYSICAL EXAM:    Vital Signs Last 24 Hrs  T(C): 36.8 (06 May 2020 08:09), Max: 36.8 (05 May 2020 16:01)  T(F): 98.3 (06 May 2020 08:09), Max: 98.3 (06 May 2020 08:09)  HR: 84 (06 May 2020 08:09) (64 - 88)  BP: 108/67 (06 May 2020 08:09) (108/67 - 121/79)  BP(mean): --  RR: 18 (06 May 2020 08:09) (18 - 18)  SpO2: 95% (06 May 2020 08:09) (93% - 96%)        LABS:                        10.8   11.37 )-----------( 308      ( 06 May 2020 08:31 )             32.5     05-06    133<L>  |  99  |  6.0<L>  ----------------------------<  97  3.6   |  20.0<L>  |  0.42<L>    Ca    13.5<HH>      06 May 2020 08:31  Phos  1.9     05-06  Mg     1.7     05-06    TPro  7.7  /  Alb  4.0  /  TBili  0.3<L>  /  DBili  x   /  AST  15  /  ALT  14  /  AlkPhos  113  05-06      LIVER FUNCTIONS - ( 06 May 2020 08:31 )  Alb: 4.0 g/dL / Pro: 7.7 g/dL / ALK PHOS: 113 U/L / ALT: 14 U/L / AST: 15 U/L / GGT: x

## 2020-05-06 NOTE — PROGRESS NOTE ADULT - ASSESSMENT
33 yo F 14 weeks pregnant ( per her GYN attending ) , reported recent COVID positive test admittted for syncopal episode , weakness found to have K 2.7 , hypomagnesemia and hypercalcemia , reported recent covid 19 positive infection     1- Syncope likely due to electrolyte abnormality of primary hyperparathyroidism   cardiology - arrhythmia likely from electrolyte abnormality.  Cardiac monitor. Outpatient .  Eventual 21 days outpatient [facility] monitoring   Leg dopplers no DVTs  . D-dimer 155     2-Primary hyperparathyoidism with Hypokalemia / hypomagnesemia / hypophosphatemia/ Hypercalcemia  continue to aggressively replace lytes to keep K 4 and over , mg over 1.8/2   Renal is following. High calcium is a concern. To obtain neck/thyroid MRI to rule out parathyroid adenoma .    started IM calcitonin to bring down elevated serum calcium .  Discussed with OBGYN team and MFM dr Tera Suarez who is OK with calcitonin in the circumstance.   Patient refusing further dose of calcitonin for nausea headache poor po intake and lethargy.     Gitelman syndrome unlikely due to low aldolase  cont iv fluid   ACE/LDH  Neck/Thyroid MRI for parathyroid adenoma hunt.   Called Endocrinology dr Fitch to assist with mgt     4- Anemia of iron deficiency and vit b12 low   Iron supplement   Received vit B12 injection     5- Recent COVID infection positive 3/26 per her gyn attending outpatient records   repeat neg on 4/22 and 4/29     Cyesis -14 weeks pregnancy   Dr Afshin RAYMOND said -he does not recommend any gyn testing Sono at present   OBGYN not coming  to [facility] in patient due to COVID rules regulations he states   call as needed     Disp:  Nephrology recommend calcitonin X4 doses to hopefully achieve elevated serum calcium abatement.  MFM confirm as not likely toxic fetuses and may be employed.     LDH, ACE levels. Neck MRI to hunt for parathyroid adenoma.  Nephrology dr Christiansen consulting endocrinology surgery for possible resection or adenectomy of parathyroid .   Endocrine dr Fitch consulted.

## 2020-05-06 NOTE — PROGRESS NOTE ADULT - SUBJECTIVE AND OBJECTIVE BOX
CC:  Hypercalcemia in pregnancy. Primary hyperparathyroidism . Multiple electrolyte abnormality . Headache nausea poor po intake since starting calcitonin and refusing further dose of the medication. Hypercalcemia 13.5 a rebound after slight deep 12.4 with first dose of calcitonin.  Has had 4 doses of calcitonin .  Called Dr Amparo Fitch Endocrinologist to assist with further mgt.  Nephrology dr Nava is following.    HPI:  33 y/o F pt  currently 14 weeks pregnant presenting today after a syncopal episode while at the grocery store. Pt states that she was standing in line when she began to feel weak and her vision slowly faded and then passed out and she woke up on the ground. Pt states she hit her head, is currently c/o headache   Pt denies any sick contact ,no leg pain , no vision problems , she admits feeling weak for few weeks , also has nausea , appetite is fair  Denies any leg pain, leg swelling. Pt denies any chest pain, dyspnea, fevers, abdominal pain, dysuria, cough, congestion, sore throat, neck pain, weakness, numbness, tingling, or other complaint. Pt was seen and evaluated by OB and cleared prior to presenting to the ED.  Labs result in the noted to have K 2.7 , high calcium admitted for severe electrolyte depletion (2020 16:15)    REVIEW OF SYSTEMS:    Patient denied fever, chills, abdominal pain, nausea, vomiting, cough, shortness of breath, chest pain or palpitations    Vital Signs Last 24 Hrs  T(C): 36.8 (06 May 2020 08:09), Max: 36.8 (05 May 2020 16:01)  T(F): 98.3 (06 May 2020 08:09), Max: 98.3 (06 May 2020 08:09)  HR: 84 (06 May 2020 08:09) (64 - 88)  BP: 108/67 (06 May 2020 08:09) (108/67 - 121/79)  BP(mean): --  RR: 18 (06 May 2020 08:09) (18 - 18)  SpO2: 95% (06 May 2020 08:09) (93% - 96%)I&O's Summary    05 May 2020 07:01  -  06 May 2020 07:00  --------------------------------------------------------  IN: 550 mL / OUT: 1000 mL / NET: -450 mL      PHYSICAL EXAM:  GENERAL: NAD, well-groomed  HEENT: PERRL, +EOMI, anicteric, no [place]  NECK: Supple, No JVD   CHEST/LUNG: CTA bilaterally; Normal effort  HEART: S1S2 Normal intensity, no murmurs, gallops or rubs noted  ABDOMEN: Soft, BS Normoactive, NT, ND, no HSM noted  EXTREMITIES:  2+ radial and DP pulses noted, no clubbing, cyanosis, or edema noted, FROM x 4  SKIN: No rashes or lesions noted  NEURO: A&Ox3, no focal deficits noted, CN II-XII intact  PSYCH: normal mood and affect; insight/judgement appropriate  LABS:                        10.8   11.37 )-----------( 308      ( 06 May 2020 08:31 )             32.5     05-06    133<L>  |  99  |  6.0<L>  ----------------------------<  97  3.6   |  20.0<L>  |  0.42<L>    Ca    13.5<HH>      06 May 2020 08:31  Phos  1.9     05-06  Mg     1.7     05-    TPro  7.7  /  Alb  4.0  /  TBili  0.3<L>  /  DBili  x   /  AST  15  /  ALT  14  /  AlkPhos  113  05-06        RADIOLOGY & ADDITIONAL TESTS:    MEDICATIONS:  MEDICATIONS  (STANDING):  ascorbic acid 1000 milliGRAM(s) Oral daily  ferrous    sulfate 325 milliGRAM(s) Oral daily  heparin   Injectable 5000 Unit(s) SubCutaneous every 8 hours  magnesium sulfate  IVPB 1 Gram(s) IV Intermittent every 6 hours  polyethylene glycol 3350 17 Gram(s) Oral daily  potassium chloride    Tablet ER 20 milliEquivalent(s) Oral two times a day  prenatal multivitamin 1 Tablet(s) Oral daily  sodium chloride 0.9%. 1000 milliLiter(s) (100 mL/Hr) IV Continuous <Continuous>    MEDICATIONS  (PRN):  acetaminophen   Tablet .. 975 milliGRAM(s) Oral every 6 hours PRN Severe Pain (7 - 10)  diphenhydrAMINE   Injectable 25 milliGRAM(s) IV Push every 6 hours PRN Itching  ondansetron Injectable 4 milliGRAM(s) IV Push every 6 hours PRN Nausea and/or Vomiting

## 2020-05-06 NOTE — PROGRESS NOTE ADULT - SUBJECTIVE AND OBJECTIVE BOX
[facility] DIVISION OF KIDNEY DISEASES AND HYPERTENSION -- FOLLOW UP NOTE  --------------------------------------------------------------------------------  Chief Complaint: Hypercalcemia,     24 hour events/subjective:    PAST HISTORY  --------------------------------------------------------------------------------  No significant changes to PMH, PSH, FHx, SHx, unless otherwise noted    ALLERGIES & MEDICATIONS  --------------------------------------------------------------------------------  Allergies    No Known Allergies    Standing Inpatient Medications  ascorbic acid 1000 milliGRAM(s) Oral daily  ferrous    sulfate 325 milliGRAM(s) Oral daily  heparin   Injectable 5000 Unit(s) SubCutaneous every 8 hours  magnesium sulfate  IVPB 1 Gram(s) IV Intermittent every 6 hours  polyethylene glycol 3350 17 Gram(s) Oral daily  potassium acid phosphate/sodium acid phosphate tablet (K-PHOS No. 2) 1 Tablet(s) Oral four times a day with meals  potassium chloride    Tablet ER 20 milliEquivalent(s) Oral two times a day  prenatal multivitamin 1 Tablet(s) Oral daily  sodium chloride 0.9%. 1000 milliLiter(s) IV Continuous <Continuous>    PRN Inpatient Medications  acetaminophen   Tablet .. 975 milliGRAM(s) Oral every 6 hours PRN  diphenhydrAMINE   Injectable 25 milliGRAM(s) IV Push every 6 hours PRN  ondansetron Injectable 4 milliGRAM(s) IV Push every 6 hours PRN    REVIEW OF SYSTEMS  --------------------------------------------------------------------------------  Gen: + weight changes, fatigue, No fevers/chills, weakness  Skin: No rashes  Head/Eyes/Ears/Mouth: No headache; Normal hearing; Normal vision w/o blurriness; No sinus pain/discomfort, sore throat  Respiratory: No dyspnea, cough, wheezing, hemoptysis  CV: No chest pain, PND, orthopnea  GI: No abdominal pain, diarrhea, constipation, nausea, vomiting, melena, hematochezia  : No increased frequency, dysuria, hematuria, nocturia  MSK: No joint pain/swelling; no back pain; no edema  Neuro: No dizziness/lightheadedness, weakness, seizures, numbness, tingling  Heme: No easy bruising or bleeding  Endo: No heat/cold intolerance  Psych: No significant nervousness, anxiety, stress, depression    All other systems were reviewed and are negative, except as noted.    VITALS/PHYSICAL EXAM  --------------------------------------------------------------------------------  T(C): 36.8 (05-06-20 @ 08:09), Max: 36.8 (05-05-20 @ 16:01)  HR: 84 (05-06-20 @ 08:09) (64 - 88)  BP: 108/67 (05-06-20 @ 08:09) (108/67 - 121/79)  RR: 18 (05-06-20 @ 08:09) (18 - 18)  SpO2: 95% (05-06-20 @ 08:09) (93% - 96%)    05-05-20 @ 07:01  -  05-06-20 @ 07:00  --------------------------------------------------------  IN: 550 mL / OUT: 1000 mL / NET: -450 mL    Physical Exam:  	Gen: NAD, well-appearing  	HEENT: PERRL, supple neck,   	Pulm: CTA B/L  	CV: RRR, S1S2; no rub  	Back: No spinal or CVA tenderness; no sacral edema  	Abd: +BS, soft, nontender/nondistended  	: No suprapubic tenderness  	UE: Warm, FROM, no clubbing, intact strength; no edema; no asterixis  	LE: Warm, FROM, no clubbing, intact strength; no edema  	Neuro: No focal deficits, intact gait  	Psych: Normal affect and mood  	Skin: Warm, without rashes  	Vascular access: NA,     LABS/STUDIES  --------------------------------------------------------------------------------              10.8   11.37 >-----------<  308      [05-06-20 @ 08:31]              32.5     133  |  99  |  6.0  ----------------------------<  97      [05-06-20 @ 08:31]  3.6   |  20.0  |  0.42        Ca     13.5     [05-06-20 @ 08:31]      Mg     1.7     [05-06-20 @ 08:31]      Phos  1.9     [05-06-20 @ 08:31]    TPro  7.7  /  Alb  4.0  /  TBili  0.3  /  DBili  x   /  AST  15  /  ALT  14  /  AlkPhos  113  [05-06-20 @ 08:31]          [05-04-20 @ 14:16]    Creatinine Trend:  SCr 0.42 [05-06 @ 08:31]  SCr 0.44 [05-06 @ 00:56]  SCr 0.41 [05-05 @ 06:37]  SCr 0.44 [05-04 @ 05:49]  SCr 0.46 [05-03 @ 06:45]    Urinalysis - [04-28-20 @ 22:15]      Color Yellow / Appearance Clear / SG 1.010 / pH 7.0      Gluc Negative / Ketone Negative  / Bili Negative / Urobili Negative       Blood Negative / Protein Negative / Leuk Est Small / Nitrite Negative      RBC Negative / WBC 0-2 / Hyaline  / Gran  / Sq Epi  / Non Sq Epi Occasional / Bacteria     Iron 26, TIBC 420, %sat 6      [04-28-20 @ 18:11]  Ferritin 87      [04-28-20 @ 18:11]  PTH -- (Ca 12.9)      [04-29-20 @ 01:20]   76  Vitamin D (25OH) 13.1      [04-29-20 @ 01:20]  TSH 0.65      [04-29-20 @ 06:41]      Free Light Chains: kappa 1.41, lambda 1.92, ratio = 0.73      [04-30 @ 21:36]  SPEP Interpretation: Normal Electrophoresis Pattern      [04-30-20 @ 21:36]

## 2020-05-06 NOTE — CHART NOTE - NSCHARTNOTEFT_GEN_A_CORE
Medicine PA-   Pt has been having nausea, vomited x2 despite zofran at beginning of shift, denies abdominal pain.  Pt is 15 wks pregnant, admitted w/ syncope, electrolyte abnormalities and primary hyperparathyroidism. Started on calcitonin which was given at 1800. VSS- 88- 11/67-18-98.1-96%   Stat labs: 05-06  134<L>  |  101  |  6.0<L>  ----------------------------<  107<H>  3.5   |  20.0<L>  |  0.44<L>    Ca    13.3<HH>      06 May 2020 00:56  Phos  1.8     05-06  Mg     1.5     05-06    A - Hypercalcemia, hyponatremia, hypomagnesium     - Hyperparathyroidism  P- Stat mag 2gm IVPB x1   - repeat labs a.m.   - monitor closely, call PA if status changes

## 2020-05-07 ENCOUNTER — RESULT REVIEW (OUTPATIENT)
Age: 35
End: 2020-05-07

## 2020-05-07 LAB
24R-OH-CALCIDIOL SERPL-MCNC: 17 NG/ML — LOW (ref 30–80)
ALBUMIN SERPL ELPH-MCNC: 3.8 G/DL — SIGNIFICANT CHANGE UP (ref 3.3–5.2)
ALP SERPL-CCNC: 117 U/L — SIGNIFICANT CHANGE UP (ref 40–120)
ALT FLD-CCNC: 13 U/L — SIGNIFICANT CHANGE UP
ANION GAP SERPL CALC-SCNC: 13 MMOL/L — SIGNIFICANT CHANGE UP (ref 5–17)
AST SERPL-CCNC: 13 U/L — SIGNIFICANT CHANGE UP
BILIRUB SERPL-MCNC: 0.5 MG/DL — SIGNIFICANT CHANGE UP (ref 0.4–2)
BUN SERPL-MCNC: 7 MG/DL — LOW (ref 8–20)
CALCIUM SERPL-MCNC: 13.3 MG/DL — CRITICAL HIGH (ref 8.6–10.2)
CALCIUM SERPL-MCNC: 13.7 MG/DL — CRITICAL HIGH (ref 8.6–10.2)
CALCIUM SERPL-MCNC: 15 MG/DL — CRITICAL HIGH (ref 8.6–10.2)
CHLORIDE SERPL-SCNC: 102 MMOL/L — SIGNIFICANT CHANGE UP (ref 98–107)
CO2 SERPL-SCNC: 20 MMOL/L — LOW (ref 22–29)
CREAT SERPL-MCNC: 0.47 MG/DL — LOW (ref 0.5–1.3)
GLUCOSE SERPL-MCNC: 103 MG/DL — HIGH (ref 70–99)
HCT VFR BLD CALC: 31.6 % — LOW (ref 34.5–45)
HGB BLD-MCNC: 10.5 G/DL — LOW (ref 11.5–15.5)
MAGNESIUM SERPL-MCNC: 2.1 MG/DL — SIGNIFICANT CHANGE UP (ref 1.8–2.6)
MCHC RBC-ENTMCNC: 27.5 PG — SIGNIFICANT CHANGE UP (ref 27–34)
MCHC RBC-ENTMCNC: 33.2 GM/DL — SIGNIFICANT CHANGE UP (ref 32–36)
MCV RBC AUTO: 82.7 FL — SIGNIFICANT CHANGE UP (ref 80–100)
PHOSPHATE SERPL-MCNC: 2.6 MG/DL — SIGNIFICANT CHANGE UP (ref 2.4–4.7)
PLATELET # BLD AUTO: 303 K/UL — SIGNIFICANT CHANGE UP (ref 150–400)
POTASSIUM SERPL-MCNC: 3.4 MMOL/L — LOW (ref 3.5–5.3)
POTASSIUM SERPL-SCNC: 3.4 MMOL/L — LOW (ref 3.5–5.3)
PROT SERPL-MCNC: 7.1 G/DL — SIGNIFICANT CHANGE UP (ref 6.6–8.7)
PTH-INTACT IO % DIF SERPL: 128 PG/ML — HIGH (ref 12–88)
PTH-INTACT IO % DIF SERPL: 15.5 PG/ML — SIGNIFICANT CHANGE UP (ref 12–88)
PTH-INTACT IO % DIF SERPL: 22.5 PG/ML — SIGNIFICANT CHANGE UP (ref 12–88)
PTH-INTACT IO % DIF SERPL: 252.5 PG/ML — HIGH (ref 12–88)
RBC # BLD: 3.82 M/UL — SIGNIFICANT CHANGE UP (ref 3.8–5.2)
RBC # FLD: 14.2 % — SIGNIFICANT CHANGE UP (ref 10.3–14.5)
SODIUM SERPL-SCNC: 134 MMOL/L — LOW (ref 135–145)
WBC # BLD: 10.12 K/UL — SIGNIFICANT CHANGE UP (ref 3.8–10.5)
WBC # FLD AUTO: 10.12 K/UL — SIGNIFICANT CHANGE UP (ref 3.8–10.5)

## 2020-05-07 PROCEDURE — 88305 TISSUE EXAM BY PATHOLOGIST: CPT | Mod: 26

## 2020-05-07 PROCEDURE — 99233 SBSQ HOSP IP/OBS HIGH 50: CPT

## 2020-05-07 PROCEDURE — 88331 PATH CONSLTJ SURG 1 BLK 1SPC: CPT | Mod: 26

## 2020-05-07 RX ORDER — TRAMADOL HYDROCHLORIDE 50 MG/1
25 TABLET ORAL EVERY 6 HOURS
Refills: 0 | Status: DISCONTINUED | OUTPATIENT
Start: 2020-05-07 | End: 2020-05-10

## 2020-05-07 RX ORDER — SODIUM CHLORIDE 9 MG/ML
1000 INJECTION INTRAMUSCULAR; INTRAVENOUS; SUBCUTANEOUS
Refills: 0 | Status: DISCONTINUED | OUTPATIENT
Start: 2020-05-07 | End: 2020-05-08

## 2020-05-07 RX ORDER — CALCIUM CARBONATE 500(1250)
1 TABLET ORAL THREE TIMES A DAY
Refills: 0 | Status: DISCONTINUED | OUTPATIENT
Start: 2020-05-07 | End: 2020-05-10

## 2020-05-07 RX ORDER — POLYETHYLENE GLYCOL 3350 17 G/17G
17 POWDER, FOR SOLUTION ORAL DAILY
Refills: 0 | Status: DISCONTINUED | OUTPATIENT
Start: 2020-05-07 | End: 2020-05-10

## 2020-05-07 RX ORDER — ONDANSETRON 8 MG/1
4 TABLET, FILM COATED ORAL EVERY 6 HOURS
Refills: 0 | Status: DISCONTINUED | OUTPATIENT
Start: 2020-05-07 | End: 2020-05-10

## 2020-05-07 RX ORDER — DIPHENHYDRAMINE HCL 50 MG
25 CAPSULE ORAL EVERY 6 HOURS
Refills: 0 | Status: DISCONTINUED | OUTPATIENT
Start: 2020-05-07 | End: 2020-05-07

## 2020-05-07 RX ORDER — ONDANSETRON 8 MG/1
4 TABLET, FILM COATED ORAL ONCE
Refills: 0 | Status: DISCONTINUED | OUTPATIENT
Start: 2020-05-07 | End: 2020-05-07

## 2020-05-07 RX ORDER — HEPARIN SODIUM 5000 [USP'U]/ML
5000 INJECTION INTRAVENOUS; SUBCUTANEOUS EVERY 8 HOURS
Refills: 0 | Status: DISCONTINUED | OUTPATIENT
Start: 2020-05-07 | End: 2020-05-10

## 2020-05-07 RX ORDER — ASCORBIC ACID 60 MG
1000 TABLET,CHEWABLE ORAL DAILY
Refills: 0 | Status: DISCONTINUED | OUTPATIENT
Start: 2020-05-07 | End: 2020-05-10

## 2020-05-07 RX ORDER — FENTANYL CITRATE 50 UG/ML
25 INJECTION INTRAVENOUS
Refills: 0 | Status: DISCONTINUED | OUTPATIENT
Start: 2020-05-07 | End: 2020-05-07

## 2020-05-07 RX ORDER — POTASSIUM CHLORIDE 20 MEQ
20 PACKET (EA) ORAL
Refills: 0 | Status: DISCONTINUED | OUTPATIENT
Start: 2020-05-07 | End: 2020-05-10

## 2020-05-07 RX ORDER — ACETAMINOPHEN 500 MG
975 TABLET ORAL EVERY 6 HOURS
Refills: 0 | Status: DISCONTINUED | OUTPATIENT
Start: 2020-05-07 | End: 2020-05-10

## 2020-05-07 RX ORDER — FERROUS SULFATE 325(65) MG
325 TABLET ORAL DAILY
Refills: 0 | Status: DISCONTINUED | OUTPATIENT
Start: 2020-05-07 | End: 2020-05-10

## 2020-05-07 RX ORDER — SODIUM CHLORIDE 9 MG/ML
1000 INJECTION, SOLUTION INTRAVENOUS
Refills: 0 | Status: DISCONTINUED | OUTPATIENT
Start: 2020-05-07 | End: 2020-05-07

## 2020-05-07 RX ORDER — DIPHENHYDRAMINE HCL 50 MG
25 CAPSULE ORAL EVERY 6 HOURS
Refills: 0 | Status: DISCONTINUED | OUTPATIENT
Start: 2020-05-07 | End: 2020-05-10

## 2020-05-07 RX ADMIN — FENTANYL CITRATE 25 MICROGRAM(S): 50 INJECTION INTRAVENOUS at 13:15

## 2020-05-07 RX ADMIN — TRAMADOL HYDROCHLORIDE 25 MILLIGRAM(S): 50 TABLET ORAL at 15:32

## 2020-05-07 RX ADMIN — Medication 975 MILLIGRAM(S): at 05:46

## 2020-05-07 RX ADMIN — SODIUM CHLORIDE 75 MILLILITER(S): 9 INJECTION, SOLUTION INTRAVENOUS at 13:15

## 2020-05-07 RX ADMIN — Medication 100 GRAM(S): at 00:17

## 2020-05-07 RX ADMIN — Medication 975 MILLIGRAM(S): at 01:00

## 2020-05-07 RX ADMIN — HEPARIN SODIUM 5000 UNIT(S): 5000 INJECTION INTRAVENOUS; SUBCUTANEOUS at 15:32

## 2020-05-07 RX ADMIN — Medication 20 MILLIEQUIVALENT(S): at 15:33

## 2020-05-07 RX ADMIN — Medication 100 GRAM(S): at 05:46

## 2020-05-07 RX ADMIN — TRAMADOL HYDROCHLORIDE 25 MILLIGRAM(S): 50 TABLET ORAL at 21:22

## 2020-05-07 RX ADMIN — Medication 20 MILLIEQUIVALENT(S): at 05:45

## 2020-05-07 RX ADMIN — HEPARIN SODIUM 5000 UNIT(S): 5000 INJECTION INTRAVENOUS; SUBCUTANEOUS at 21:23

## 2020-05-07 RX ADMIN — Medication 975 MILLIGRAM(S): at 20:19

## 2020-05-07 NOTE — PROGRESS NOTE ADULT - ASSESSMENT
·  PRE-OP DIAGNOSIS:  Primary hyperparathyroidism 07-May-2020 13:00:39  Giorgi Worley.  ·  POST-OP DIAGNOSIS:  Primary hyperparathyroidism 07-May-2020 13:00:51  Giorgi Worley.  ·  PROCEDURES:  Left parathyroidectomy 07-May-2020 13:00:15 Left superior and inferior parathyroid resection Giorgi Worley.      Will Follow,

## 2020-05-07 NOTE — PROGRESS NOTE ADULT - ASSESSMENT
35y/o 14 weeks gravid lady with extreme hypercalcemia, presumed primary hyperparathyroidism, with localized adenoma on MRI.    Pt S/p Left parathyroidectomy. tolerating well postop.     F/u overnight.

## 2020-05-07 NOTE — CHART NOTE - NSCHARTNOTEFT_GEN_A_CORE
Elena is a 33yo  at approximately 15 weeks gestation admitted with severe hypercalcemia and hypokalemia secondary to primary hyperparathyroidism with MRI findings consistent with parathyroid adenoma left side. She is cleared for surgical management. The preoperative fetal heart check was performed, was found to be 135-140 range.  Will follow up with postop fetal heart rate check.

## 2020-05-07 NOTE — CHART NOTE - NSCHARTNOTEFT_GEN_A_CORE
Elena is a 33yo  at approximately 15 weeks gestation admitted with severe hypercalcemia and hypokalemia secondary to primary hyperparathyroidism with MRI findings consistent with parathyroid adenoma left side. She is status post surgical resection of parathyroid adenoma. The postoperative fetal heart rate check was performed, was found to be 120-125 range.  Patient is going to stay overnight per medicine for evaluation of her postop electrolyte levels.

## 2020-05-07 NOTE — BRIEF OPERATIVE NOTE - NSICDXBRIEFPROCEDURE_GEN_ALL_CORE_FT
PROCEDURES:  Left parathyroidectomy 07-May-2020 13:00:15 Left superior and inferior parathyroid resection Giorgi Worley

## 2020-05-07 NOTE — CONSULT NOTE ADULT - ASSESSMENT
33y/o 14 weeks gravid lady with extreme hypercalcemia, presumed primary hyperparathyroidism, with localized adenoma on MRI.  Pt taken for emergent parathyroidectomy today as she could not tolerate medical therapy and limited in therapeutics given her pregnancy.  OB has cleared her for surgery.  Risks reviewed with the patient over  phone, to include but not limited to bleeding, infection, recurrent laryngeal and superior laryngeal nerve injury, need for calcium replacement therapy, post operative medical complications including MI, stroke, DVT, PE, PNA, minimal risk of fetal demise, patient demise.  She expresses understanding and is amenable to proceeding with this emergent case.  Consent signed and on chart.

## 2020-05-07 NOTE — BRIEF OPERATIVE NOTE - OPERATION/FINDINGS
- left medial mobilization of thyroid gland with exposure of inferior and superior parathyroid glands, which appeared grossly enlarged  - intraop PTH sent after parathyroid gland resection, with a drop of >50% in serum PTH level

## 2020-05-07 NOTE — PROGRESS NOTE ADULT - SUBJECTIVE AND OBJECTIVE BOX
Pt seen and examined at bedside  Pt A&O x3, sitting on couch in room  Pt denies further nausea/vomiting  Complains of b/l puritis to hands, secondary to hypercalcemia. Benadryl PRN   Has been NPO since midnight  Aware of plan for OR today for adenoma resection  ROS neg  VSS     REVIEW OF SYSTEMS:    Patient denied fever, chills, abdominal pain, nausea, vomiting, cough, shortness of breath, chest pain or palpitations    Vital Signs Last 24 Hrs  T(C): 36.7 (07 May 2020 08:15), Max: 36.7 (06 May 2020 15:57)  T(F): 98 (07 May 2020 08:15), Max: 98.1 (06 May 2020 23:30)  HR: 83 (07 May 2020 08:15) (81 - 85)  BP: 104/60 (07 May 2020 08:15) (104/60 - 123/64)  BP(mean): --  RR: 18 (07 May 2020 08:15) (18 - 18)  SpO2: 95% (07 May 2020 08:15) (93% - 95%) on RA    PHYSICAL EXAM:  GENERAL: NAD, well-groomed  HEENT: PERRL, +EOMI, anicteric, no [place]  NECK: Supple, No JVD   CHEST/LUNG: CTA bilaterally; Normal effort  HEART: S1S2 Normal intensity, no murmurs, gallops or rubs noted  ABDOMEN: Soft, BS Normoactive, NT, ND, no HSM noted  EXTREMITIES:  2+ radial and DP pulses noted, no clubbing, cyanosis, or edema noted, FROM x 4  SKIN: No rashes or lesions noted  NEURO: A&Ox3, no focal deficits noted, CN II-XII intact  PSYCH: normal mood and affect; insight/judgement appropriate      LABS/IMAGING: REVIEWED

## 2020-05-07 NOTE — PROGRESS NOTE ADULT - SUBJECTIVE AND OBJECTIVE BOX
POSTOP CHECK    Patient examined at bedside, found in bed comfortable, pain under control. Denies SOB, chest pain, n/v/f/c.     MEDICATIONS  (STANDING):  ascorbic acid 1000 milliGRAM(s) Oral daily  calcium carbonate    500 mG (Tums) Chewable 1 Tablet(s) Chew three times a day  ferrous    sulfate 325 milliGRAM(s) Oral daily  heparin   Injectable 5000 Unit(s) SubCutaneous every 8 hours  polyethylene glycol 3350 17 Gram(s) Oral daily  potassium chloride    Tablet ER 20 milliEquivalent(s) Oral two times a day  prenatal multivitamin 1 Tablet(s) Oral daily  sodium chloride 0.9%. 1000 milliLiter(s) (100 mL/Hr) IV Continuous <Continuous>    MEDICATIONS  (PRN):  acetaminophen   Tablet .. 975 milliGRAM(s) Oral every 6 hours PRN Severe Pain (7 - 10)  diphenhydrAMINE   Injectable 25 milliGRAM(s) IV Push every 6 hours PRN Itching  ondansetron Injectable 4 milliGRAM(s) IV Push every 6 hours PRN Nausea and/or Vomiting  traMADol 25 milliGRAM(s) Oral every 6 hours PRN Moderate Pain (4 - 6)      Vital Signs Last 24 Hrs  T(C): 36.8 (07 May 2020 16:44), Max: 36.8 (07 May 2020 13:00)  T(F): 98.2 (07 May 2020 16:44), Max: 98.3 (07 May 2020 13:00)  HR: 97 (07 May 2020 16:44) (81 - 106)  BP: 125/67 (07 May 2020 16:44) (104/60 - 142/85)  BP(mean): 85 (07 May 2020 13:15) (79 - 85)  RR: 18 (07 May 2020 16:44) (16 - 26)  SpO2: 95% (07 May 2020 16:44) (95% - 99%)    Constitutional: patient resting comfortably in bed, in no acute distress  HEENT: EOMI / PERRL b/l, no active drainage or redness. Incision present 5 cm horizontal well approximated, not signs of bleeding or dehiscence.   Neck: No JVD, full ROM without pain  Respiratory: respirations are unlabored, no accessory muscle use, no conversational dyspnea  Cardiovascular: regular rate & rhythm  Gastrointestinal: Abdomen soft, non-tender, non-distended, no rebound tenderness / guarding  Neurological: GCS: 15 (4/5/6). A&O x 3; no gross sensory / motor / coordination deficits  Psychiatric: Normal mood, normal affect  Musculoskeletal: No joint pain, swelling or deformity; no limitation of movement      I&O's Detail    06 May 2020 07:01  -  07 May 2020 07:00  --------------------------------------------------------  IN:    sodium chloride 0.9%: 1200 mL    Solution: 100 mL  Total IN: 1300 mL    OUT:  Total OUT: 0 mL    Total NET: 1300 mL          LABS:                        10.5   10.12 )-----------( 303      ( 07 May 2020 08:32 )             31.6     05-07    134<L>  |  102  |  7.0<L>  ----------------------------<  103<H>  3.4<L>   |  20.0<L>  |  0.47<L>    Ca    13.7<HH>      07 May 2020 13:31  Phos  2.6     05-07  Mg     2.1     05-07    TPro  7.1  /  Alb  3.8  /  TBili  0.5  /  DBili  x   /  AST  13  /  ALT  13  /  AlkPhos  117  05-07

## 2020-05-07 NOTE — PROGRESS NOTE ADULT - ASSESSMENT
35 yo F 14 weeks pregnant ( per her GYN attending ) , reported recent COVID positive test admittted for syncopal episode , weakness found to have K 2.7 , hypomagnesemia and hypercalcemia , reported recent covid 19 positive infection     1. Syncope   - Likely due to electrolyte abnormality of primary hyperparathyroidism   - TTE- EF 55-60%, unremarkable   - Seen by cardio, eventual set up for 21 day MCOT monitor as an outpatient on DC    2. Primary hyperparathyroidism  - Complicated by Hypokalemia / hypomagnesemia / hypophosphatemia/ Hypercalcemia  - MRI neck reviewed, parathyroid adenoma   - Seen in consult by endo, plan for OR resection today  - Continue to aggressively replace lytes   - S/p 4 doses of calcitonin, refractory nausea/vomiting. Pt refusing further doses of calcitonin due to headache/n/v  - Renal is following    - Discussed with OBGYN team and M Dr. Giorgi Suarez who is OK with calcitonin in the circumstance    - Cont iv fluid     4. Anemia of iron deficiency and vit b12 low   - Iron supplement   - Received vit B12 injection     5. Recent COVID infection   - Positive 3/26 per her GYN attending outpatient records   - Repeat neg on 4/22 and 4/29     6. Cyesis  - 14 weeks pregnancy   - Dr Afshin RAYMOND said - he does not recommend any gyn testing Sono at present   - Seen in consult by [facility]    DVT PPE scds 33 yo F 14 weeks pregnant ( per her GYN attending ) , reported recent COVID positive test admittted for syncopal episode , weakness found to have K 2.7 , hypomagnesemia and hypercalcemia , reported recent covid 19 positive infection     1. Syncope   - Likely due to electrolyte abnormality of primary hyperparathyroidism   - TTE- EF 55-60%, unremarkable   - Seen by cardio, eventual set up for 21 day MCOT monitor as an outpatient on DC    2. Primary hyperparathyroidism  - Complicated by Hypokalemia / hypomagnesemia / hypophosphatemia/ Hypercalcemia  - MRI neck reviewed, parathyroid adenoma   - Seen in consult by endo, plan for OR resection today  - Continue to aggressively replace lytes   - S/p 4 doses of calcitonin, refractory nausea/vomiting. Pt refusing further doses of calcitonin due to headache/n/v  - Renal is following    - Discussed with OBGYN team and M Dr. Giorgi Suarez who is OK with calcitonin in the circumstance    - Cont iv fluid     4. Anemia of iron deficiency and vit b12 low   - Iron supplement   - Received vit B12 injection     5. Recent COVID infection   - Positive 3/26 per her GYN attending outpatient records   - Repeat neg on 4/22 and 4/29     6. Cyesis  - 14 weeks pregnancy   - Dr Afshin RAYMOND said - he does not recommend any gyn testing Sono at present   - Seen in consult by [facility]    DVT PPE Heparin 35 yo F 14 weeks pregnant ( per her GYN attending ) admitted with synocpal episode secondary to severe electrolyte abnormalities. Found with primary hypperparathyroidisim secondary to parathyroid adenoma. Seen in consult by OB, nephro and endo. For surgical resection today.    1. Syncope   - Likely due to electrolyte abnormality of primary hyperparathyroidism   - TTE- EF 55-60%, unremarkable   - Seen by cardio, eventual set up for 21 day MCOT monitor as an outpatient on DC    2. Primary hyperparathyroidism  - Complicated by Hypokalemia / hypomagnesemia / hypophosphatemia/ Hypercalcemia  - MRI neck reviewed, parathyroid adenoma   - Seen in consult by endo, plan for OR resection today  - Continue to aggressively replace lytes   - S/p 4 doses of calcitonin, refractory nausea/vomiting. Pt refusing further doses of calcitonin due to headache/n/v  - Renal is following    - Discussed with OBGYN team and M Dr. Giorgi Suarez who is OK with calcitonin in the circumstance    - Cont iv fluid     4. Anemia of iron deficiency and vit b12 low   - Iron supplement   - Received vit B12 injection     5. Recent COVID infection   - Positive 3/26 per her GYN attending outpatient records   - Repeat neg on 4/22 and 4/29     6. Cyesis  - 14 weeks pregnancy   - Dr Afshin RAYMOND said - he does not recommend any gyn testing Sono at present   - [facility]    DVT PPE Heparin

## 2020-05-07 NOTE — CONSULT NOTE ADULT - SUBJECTIVE AND OBJECTIVE BOX
HPI:  33 y/o female pt ( currently 14 weeks pregnant )presented after a syncopal episode while at the grocery store. Pt reported feeling weak when standing in line, her vision faded slowly and she then woke up on the ground. Pt was tested positive for COVID-19 in march but repeat testing in ED was negative. Pt found to have hypokalemia, hypercalcemia and hypermagnesemia. Nephrology consulted for further evaluation.    Pt seen and examined at bedside. She is complaining of headache; denies any other complaint. Denies history of hypokalemia in the past. Denies nausea, vomiting, diarrhea, decreased po intake. Reports she was feeling weak for the past few weeks. Her only medication is prenatal vitamins. Reports history of low potassium in mother.     MRI reveals left superior pole parathyroid adenoma    Calcium 15.0      PAST HISTORY  --------------------------------------------------------------------------------  PAST MEDICAL & SURGICAL HISTORY:  No pertinent past medical history  S/P : x2    FAMILY HISTORY: FH of hypokalemia in mother    PAST SOCIAL HISTORY: ; works at a factory    ALLERGIES & MEDICATIONS  --------------------------------------------------------------------------------  Allergies    No Known Allergies    Intolerances      Standing Inpatient Medications  ascorbic acid 1000 milliGRAM(s) Oral daily  cyanocobalamin Injectable 1000 MICROGram(s) SubCutaneous daily  ergocalciferol 75967 Unit(s) Oral every week  ferrous    sulfate 325 milliGRAM(s) Oral daily  heparin   Injectable 5000 Unit(s) SubCutaneous every 8 hours  iron sucrose IVPB 200 milliGRAM(s) IV Intermittent every 24 hours  magnesium oxide 400 milliGRAM(s) Oral three times a day with meals  magnesium sulfate  IVPB 2 Gram(s) IV Intermittent every 1 hour  potassium phosphate / sodium phosphate powder 1 Packet(s) Oral three times a day  potassium phosphate IVPB 15 milliMole(s) IV Intermittent once  prenatal multivitamin 1 Tablet(s) Oral daily  sodium chloride 0.9% with potassium chloride 40 mEq/L 1000 milliLiter(s) IV Continuous <Continuous>    PRN Inpatient Medications  acetaminophen   Tablet .. 975 milliGRAM(s) Oral every 6 hours PRN  ondansetron Injectable 4 milliGRAM(s) IV Push every 6 hours PRN      REVIEW OF SYSTEMS  --------------------------------------------------------------------------------  Gen: No weight changes, fatigue, fevers/chills, weakness  Skin: No rashes  Head/Eyes/Ears/Mouth:  headache+; Normal hearing; Normal vision w/o blurriness; No sinus pain/discomfort, sore throat  Respiratory: No dyspnea, cough, wheezing, hemoptysis  CV: No chest pain, PND, orthopnea  GI: No abdominal pain, diarrhea, constipation, nausea, vomiting, melena, hematochezia  : No increased frequency, dysuria, hematuria, nocturia  MSK: No joint pain/swelling; no back pain; no edema  Neuro: No dizziness/lightheadedness, weakness, seizures, numbness, tingling  Heme: No easy bruising or bleeding  Endo: No heat/cold intolerance  Psych: No significant nervousness, anxiety, stress, depression    All other systems were reviewed and are negative, except as noted.    Vitals stable    Gen NAD  HEENT PERRLA, EOMI  Neck supple, no masses  Chest CTAB  Abd soft, 14 week gravid  Extr no edema, paresthesias in BUE  Neuro grossly intact      NECK MRI  IMPRESSION:    1.  A 1.0 x 0.5 x 1.5 cm T2 hyperintense lesion along the left tracheoesophageal groove and posterior to the superior pole of the left thyroid. This is incompletely evaluated on this exam but could represent a parathyroid adenoma given its location. Recommend further evaluation with CT neck with contrast using parathyroid protocol.  2.  Diffusely low T1 marrow signal suggesting marrow reactivation such as in anemia or marrow replacement.

## 2020-05-07 NOTE — PROGRESS NOTE ADULT - SUBJECTIVE AND OBJECTIVE BOX
Vital Signs Last 24 Hrs,    T(C): 36.8 (07 May 2020 13:00), Max: 36.8 (07 May 2020 13:00)  T(F): 98.3 (07 May 2020 13:00), Max: 98.3 (07 May 2020 13:00)  HR: 81 (07 May 2020 13:15) (81 - 85)  BP: 126/72 (07 May 2020 13:15) (104/60 - 142/85)  BP(mean): 85 (07 May 2020 13:15) (79 - 85)  RR: 21 (07 May 2020 13:15) (16 - 26)  SpO2: 98% (07 May 2020 13:15) (93% - 99%)    134<L>  |  102    |  7.0<L>  ----------------------------<  103<H>  Ca:15.0<HH> (07 May 2020 08:32)  3.4<L>   |  20.0<L>  |  0.47<L>    eGFR if Non : 129  eGFR if : 149    TPro  7.1    /  Alb  3.8    /  TBili  0.5    /  DBili  x      /  AST  13     /  ALT  13     /  AlkPhos  117    07 May 2020 08:32                        10.5<L>  10.12 )-----------( 303      ( 07 May 2020 08:32 )             31.6<L>    Phos:2.6 mg/dL M.1 mg/dL     Kaity:55 mmol/L UCl:48 mmol/L UK:12 mmol/L ( @ 22:13)    Chief Complaint: Hypercalcemia,     24 hour events/subjective:    PAST HISTORY  --------------------------------------------------------------------------------  No significant changes to PMH, PSH, FHx, SHx, unless otherwise noted    ALLERGIES & MEDICATIONS  --------------------------------------------------------------------------------  Allergies    No Known Allergies    Standing Inpatient Medications  ascorbic acid 1000 milliGRAM(s) Oral daily  ferrous    sulfate 325 milliGRAM(s) Oral daily  heparin   Injectable 5000 Unit(s) SubCutaneous every 8 hours  magnesium sulfate  IVPB 1 Gram(s) IV Intermittent every 6 hours  polyethylene glycol 3350 17 Gram(s) Oral daily  potassium acid phosphate/sodium acid phosphate tablet (K-PHOS No. 2) 1 Tablet(s) Oral four times a day with meals  potassium chloride    Tablet ER 20 milliEquivalent(s) Oral two times a day  prenatal multivitamin 1 Tablet(s) Oral daily  sodium chloride 0.9%. 1000 milliLiter(s) IV Continuous <Continuous>    PRN Inpatient Medications  acetaminophen   Tablet .. 975 milliGRAM(s) Oral every 6 hours PRN  diphenhydrAMINE   Injectable 25 milliGRAM(s) IV Push every 6 hours PRN  ondansetron Injectable 4 milliGRAM(s) IV Push every 6 hours PRN    REVIEW OF SYSTEMS  --------------------------------------------------------------------------------  Gen: + weight changes, fatigue, No fevers/chills, weakness  Skin: No rashes  Head/Eyes/Ears/Mouth: No headache; Normal hearing; Normal vision w/o blurriness; No sinus pain/discomfort, sore throat  Respiratory: No dyspnea, cough, wheezing, hemoptysis  CV: No chest pain, PND, orthopnea  GI: No abdominal pain, diarrhea, constipation, nausea, vomiting, melena, hematochezia  : No increased frequency, dysuria, hematuria, nocturia  MSK: No joint pain/swelling; no back pain; no edema  Neuro: No dizziness/lightheadedness, weakness, seizures, numbness, tingling  Heme: No easy bruising or bleeding  Endo: No heat/cold intolerance  Psych: No significant nervousness, anxiety, stress, depression    All other systems were reviewed and are negative, except as noted.    VITALS/PHYSICAL EXAM  --------------------------------------------------------------------------------  T(C): 36.8 (20 @ 08:09), Max: 36.8 (20 @ 16:01)  HR: 84 (20 @ 08:09) (64 - 88)  BP: 108/67 (20 @ 08:09) (108/67 - 121/79)  RR: 18 (20 @ 08:09) (18 - 18)  SpO2: 95% (20 @ 08:09) (93% - 96%)    20 @ 07:01  -  20 @ 07:00  --------------------------------------------------------  IN: 550 mL / OUT: 1000 mL / NET: -450 mL    Physical Exam:  	Gen: NAD, well-appearing  	HEENT: PERRL, supple neck,   	Pulm: CTA B/L  	CV: RRR, S1S2; no rub  	Back: No spinal or CVA tenderness; no sacral edema  	Abd: +BS, soft, nontender/nondistended  	: No suprapubic tenderness  	UE: Warm, FROM, no clubbing, intact strength; no edema; no asterixis  	LE: Warm, FROM, no clubbing, intact strength; no edema  	Neuro: No focal deficits, intact gait  	Psych: Normal affect and mood  	Skin: Warm, without rashes  	Vascular access: NA,     LABS/STUDIES  --------------------------------------------------------------------------------              10.8   11.37 >-----------<  308      [20 08:31]              32.5     133  |  99  |  6.0  ----------------------------<  97      [20 08:31]  3.6   |  20.0  |  0.42        Ca     13.5     [20 08:31]      Mg     1.7     [20 08:31]      Phos  1.9     [20 08:31]    TPro  7.7  /  Alb  4.0  /  TBili  0.3  /  DBili  x   /  AST  15  /  ALT  14  /  AlkPhos  113  [20 08:31]          [20 @ 14:16]    Creatinine Trend:  SCr 0.42 [ 08:31]  SCr 0.44 [ 00:56]  SCr 0.41 [ 06:37]  SCr 0.44 [ 05:49]  SCr 0.46 [ 06:45]    Urinalysis - [20 @ 22:15]      Color Yellow / Appearance Clear / SG 1.010 / pH 7.0      Gluc Negative / Ketone Negative  / Bili Negative / Urobili Negative       Blood Negative / Protein Negative / Leuk Est Small / Nitrite Negative      RBC Negative / WBC 0-2 / Hyaline  / Gran  / Sq Epi  / Non Sq Epi Occasional / Bacteria     Iron 26, TIBC 420, %sat 6      [20 @ 18:11]  Ferritin 87      [20 18:11]  PTH -- (Ca 12.9)      [20 01:20]   76  Vitamin D (25OH) 13.1      [20 01:20]  TSH 0.65      [20 @ 06:41]    Free Light Chains: kappa 1.41, lambda 1.92, ratio = 0.73      [ 21:36]  SPEP Interpretation: Normal Electrophoresis Pattern      [20 @ 21:36]      33 yo F 14 weeks pregnant ( per her GYN attending ) , reported recent COVID positive test admittted for syncopal episode , weakness found to have K 2.7 , hypomagnesemia and hypercalcemia , reported recent covid 19 positive infection     1- Syncope likely due to electrolyte abnormality of primary hyperparathyroidism   cardiology - arrhythmia likely from electrolyte abnormality.  Cardiac monitor. Outpatient .  Eventual 21 days outpatient MCOT monitoring   Leg dopplers no DVTs  . D-dimer 155     2-Primary hyperparathyoidism with Hypokalemia / hypomagnesemia / hypophosphatemia/ Hypercalcemia  continue to aggressively replace lytes to keep K 4 and over , mg over 1.8/2   High calcium is a concern. To obtain neck/thyroid MRI to rule out parathyroid adenoma .    started IM calcitonin to bring down elevated serum calcium .  Discussed with OBGYN team and MFM dr Tera Suarez who is OK with calcitonin in the circumstance.   Patient refusing further dose of calcitonin for nausea headache poor po intake and lethargy.     Gitelman syndrome unlikely due to low aldosterone,   cont iv fluid   ACE/LDH  Neck/Thyroid MRI for parathyroid adenoma hunt.   Called Endocrinology dr Fitch to assist with mgt     4- Anemia of iron deficiency and vit b12 low   Iron supplement   Received vit B12 injection     5- Recent COVID infection positive 3/26 per her gyn attending outpatient records   repeat neg on  and      14 weeks pregnancy   Dr Afshin RAYMOND said -he does not recommend any gyn testing [facility] at present   OBGYN not coming  to [facility] in patient due to COVID rules regulations he states   call as needed     LDH, ACE levels. Neck MRI to hunt for parathyroid adenoma.      Endocrine  surgery for  resection or adenectomy of parathyroid .

## 2020-05-08 DIAGNOSIS — Z3A.18 18 WEEKS GESTATION OF PREGNANCY: ICD-10-CM

## 2020-05-08 DIAGNOSIS — E83.52 HYPERCALCEMIA: ICD-10-CM

## 2020-05-08 DIAGNOSIS — E87.6 HYPOKALEMIA: ICD-10-CM

## 2020-05-08 LAB
ALDOST SERPL-MCNC: 7.5 NG/DL — SIGNIFICANT CHANGE UP
ANION GAP SERPL CALC-SCNC: 12 MMOL/L — SIGNIFICANT CHANGE UP (ref 5–17)
ANION GAP SERPL CALC-SCNC: 13 MMOL/L — SIGNIFICANT CHANGE UP (ref 5–17)
BASOPHILS # BLD AUTO: 0.03 K/UL — SIGNIFICANT CHANGE UP (ref 0–0.2)
BASOPHILS NFR BLD AUTO: 0.3 % — SIGNIFICANT CHANGE UP (ref 0–2)
BUN SERPL-MCNC: 10 MG/DL — SIGNIFICANT CHANGE UP (ref 8–20)
BUN SERPL-MCNC: 8 MG/DL — SIGNIFICANT CHANGE UP (ref 8–20)
CALCIUM SERPL-MCNC: 10.7 MG/DL — HIGH (ref 8.6–10.2)
CALCIUM SERPL-MCNC: 11.1 MG/DL — HIGH (ref 8.6–10.2)
CALCIUM SERPL-MCNC: 11.1 MG/DL — HIGH (ref 8.6–10.2)
CHLORIDE SERPL-SCNC: 102 MMOL/L — SIGNIFICANT CHANGE UP (ref 98–107)
CHLORIDE SERPL-SCNC: 104 MMOL/L — SIGNIFICANT CHANGE UP (ref 98–107)
CO2 SERPL-SCNC: 19 MMOL/L — LOW (ref 22–29)
CO2 SERPL-SCNC: 22 MMOL/L — SIGNIFICANT CHANGE UP (ref 22–29)
CREAT SERPL-MCNC: 0.49 MG/DL — LOW (ref 0.5–1.3)
CREAT SERPL-MCNC: 0.53 MG/DL — SIGNIFICANT CHANGE UP (ref 0.5–1.3)
EOSINOPHIL # BLD AUTO: 0.04 K/UL — SIGNIFICANT CHANGE UP (ref 0–0.5)
EOSINOPHIL NFR BLD AUTO: 0.4 % — SIGNIFICANT CHANGE UP (ref 0–6)
GLUCOSE SERPL-MCNC: 76 MG/DL — SIGNIFICANT CHANGE UP (ref 70–99)
GLUCOSE SERPL-MCNC: 93 MG/DL — SIGNIFICANT CHANGE UP (ref 70–99)
HCT VFR BLD CALC: 28.2 % — LOW (ref 34.5–45)
HGB BLD-MCNC: 9.2 G/DL — LOW (ref 11.5–15.5)
IMM GRANULOCYTES NFR BLD AUTO: 0.5 % — SIGNIFICANT CHANGE UP (ref 0–1.5)
LYMPHOCYTES # BLD AUTO: 2.65 K/UL — SIGNIFICANT CHANGE UP (ref 1–3.3)
LYMPHOCYTES # BLD AUTO: 26.2 % — SIGNIFICANT CHANGE UP (ref 13–44)
MAGNESIUM SERPL-MCNC: 1.1 MG/DL — LOW (ref 1.6–2.6)
MAGNESIUM SERPL-MCNC: 1.6 MG/DL — SIGNIFICANT CHANGE UP (ref 1.6–2.6)
MCHC RBC-ENTMCNC: 27.5 PG — SIGNIFICANT CHANGE UP (ref 27–34)
MCHC RBC-ENTMCNC: 32.6 GM/DL — SIGNIFICANT CHANGE UP (ref 32–36)
MCV RBC AUTO: 84.4 FL — SIGNIFICANT CHANGE UP (ref 80–100)
MONOCYTES # BLD AUTO: 0.59 K/UL — SIGNIFICANT CHANGE UP (ref 0–0.9)
MONOCYTES NFR BLD AUTO: 5.8 % — SIGNIFICANT CHANGE UP (ref 2–14)
NEUTROPHILS # BLD AUTO: 6.74 K/UL — SIGNIFICANT CHANGE UP (ref 1.8–7.4)
NEUTROPHILS NFR BLD AUTO: 66.8 % — SIGNIFICANT CHANGE UP (ref 43–77)
PHOSPHATE SERPL-MCNC: 1.5 MG/DL — LOW (ref 2.4–4.7)
PLATELET # BLD AUTO: 266 K/UL — SIGNIFICANT CHANGE UP (ref 150–400)
POTASSIUM SERPL-MCNC: 2.9 MMOL/L — CRITICAL LOW (ref 3.5–5.3)
POTASSIUM SERPL-MCNC: 3.1 MMOL/L — LOW (ref 3.5–5.3)
POTASSIUM SERPL-SCNC: 2.9 MMOL/L — CRITICAL LOW (ref 3.5–5.3)
POTASSIUM SERPL-SCNC: 3.1 MMOL/L — LOW (ref 3.5–5.3)
POTASSIUM UR-SCNC: 26 MMOL/L — SIGNIFICANT CHANGE UP
RBC # BLD: 3.34 M/UL — LOW (ref 3.8–5.2)
RBC # FLD: 14 % — SIGNIFICANT CHANGE UP (ref 10.3–14.5)
RENIN DIRECT, PLASMA: 33.9 PG/ML — HIGH
SODIUM SERPL-SCNC: 135 MMOL/L — SIGNIFICANT CHANGE UP (ref 135–145)
SODIUM SERPL-SCNC: 136 MMOL/L — SIGNIFICANT CHANGE UP (ref 135–145)
SODIUM UR-SCNC: 57 MMOL/L — SIGNIFICANT CHANGE UP
VIT D25+D1,25 OH+D1,25 PNL SERPL-MCNC: 145 PG/ML — HIGH (ref 19.9–79.3)
WBC # BLD: 10.1 K/UL — SIGNIFICANT CHANGE UP (ref 3.8–10.5)
WBC # FLD AUTO: 10.1 K/UL — SIGNIFICANT CHANGE UP (ref 3.8–10.5)

## 2020-05-08 PROCEDURE — 99233 SBSQ HOSP IP/OBS HIGH 50: CPT

## 2020-05-08 RX ORDER — MAGNESIUM SULFATE 500 MG/ML
4 VIAL (ML) INJECTION ONCE
Refills: 0 | Status: COMPLETED | OUTPATIENT
Start: 2020-05-08 | End: 2020-05-08

## 2020-05-08 RX ORDER — POTASSIUM CHLORIDE 20 MEQ
40 PACKET (EA) ORAL ONCE
Refills: 0 | Status: COMPLETED | OUTPATIENT
Start: 2020-05-08 | End: 2020-05-08

## 2020-05-08 RX ORDER — POTASSIUM CHLORIDE 20 MEQ
40 PACKET (EA) ORAL DAILY
Refills: 0 | Status: COMPLETED | OUTPATIENT
Start: 2020-05-08 | End: 2020-05-09

## 2020-05-08 RX ORDER — DEXTROSE MONOHYDRATE, SODIUM CHLORIDE, AND POTASSIUM CHLORIDE 50; .745; 4.5 G/1000ML; G/1000ML; G/1000ML
1000 INJECTION, SOLUTION INTRAVENOUS
Refills: 0 | Status: COMPLETED | OUTPATIENT
Start: 2020-05-08 | End: 2020-05-09

## 2020-05-08 RX ORDER — POTASSIUM CHLORIDE 20 MEQ
10 PACKET (EA) ORAL
Refills: 0 | Status: COMPLETED | OUTPATIENT
Start: 2020-05-08 | End: 2020-05-08

## 2020-05-08 RX ADMIN — Medication 25 MILLIGRAM(S): at 01:08

## 2020-05-08 RX ADMIN — Medication 1 TABLET(S): at 21:33

## 2020-05-08 RX ADMIN — Medication 1000 MILLIGRAM(S): at 11:21

## 2020-05-08 RX ADMIN — Medication 100 GRAM(S): at 11:20

## 2020-05-08 RX ADMIN — Medication 20 MILLIEQUIVALENT(S): at 05:21

## 2020-05-08 RX ADMIN — Medication 1 TABLET(S): at 16:32

## 2020-05-08 RX ADMIN — HEPARIN SODIUM 5000 UNIT(S): 5000 INJECTION INTRAVENOUS; SUBCUTANEOUS at 11:21

## 2020-05-08 RX ADMIN — POLYETHYLENE GLYCOL 3350 17 GRAM(S): 17 POWDER, FOR SOLUTION ORAL at 11:21

## 2020-05-08 RX ADMIN — Medication 100 MILLIEQUIVALENT(S): at 16:25

## 2020-05-08 RX ADMIN — Medication 325 MILLIGRAM(S): at 11:22

## 2020-05-08 RX ADMIN — Medication 975 MILLIGRAM(S): at 05:21

## 2020-05-08 RX ADMIN — Medication 40 MILLIEQUIVALENT(S): at 21:40

## 2020-05-08 RX ADMIN — Medication 100 MILLIEQUIVALENT(S): at 12:30

## 2020-05-08 RX ADMIN — Medication 100 MILLIEQUIVALENT(S): at 14:37

## 2020-05-08 RX ADMIN — Medication 20 MILLIEQUIVALENT(S): at 17:53

## 2020-05-08 RX ADMIN — Medication 40 MILLIEQUIVALENT(S): at 11:21

## 2020-05-08 RX ADMIN — HEPARIN SODIUM 5000 UNIT(S): 5000 INJECTION INTRAVENOUS; SUBCUTANEOUS at 21:33

## 2020-05-08 RX ADMIN — HEPARIN SODIUM 5000 UNIT(S): 5000 INJECTION INTRAVENOUS; SUBCUTANEOUS at 05:20

## 2020-05-08 RX ADMIN — DEXTROSE MONOHYDRATE, SODIUM CHLORIDE, AND POTASSIUM CHLORIDE 100 MILLILITER(S): 50; .745; 4.5 INJECTION, SOLUTION INTRAVENOUS at 19:50

## 2020-05-08 NOTE — PROGRESS NOTE ADULT - SUBJECTIVE AND OBJECTIVE BOX
INTERVAL HPI/OVERNIGHT EVENTS: follow up hyperparathyroidism    MEDICATIONS  (STANDING):  ascorbic acid 1000 milliGRAM(s) Oral daily  calcium carbonate    500 mG (Tums) Chewable 1 Tablet(s) Chew three times a day  ferrous    sulfate 325 milliGRAM(s) Oral daily  heparin   Injectable 5000 Unit(s) SubCutaneous every 8 hours  polyethylene glycol 3350 17 Gram(s) Oral daily  potassium chloride    Tablet ER 20 milliEquivalent(s) Oral two times a day  potassium chloride  10 mEq/100 mL IVPB 10 milliEquivalent(s) IV Intermittent every 1 hour  prenatal multivitamin 1 Tablet(s) Oral daily    MEDICATIONS  (PRN):  acetaminophen   Tablet .. 975 milliGRAM(s) Oral every 6 hours PRN Severe Pain (7 - 10)  diphenhydrAMINE   Injectable 25 milliGRAM(s) IV Push every 6 hours PRN Insomnia/rash and itchiness  ondansetron Injectable 4 milliGRAM(s) IV Push every 6 hours PRN Nausea and/or Vomiting  traMADol 25 milliGRAM(s) Oral every 6 hours PRN Moderate Pain (4 - 6)      Allergies    No Known Allergies    Intolerances      Vital Signs Last 24 Hrs  T(C): 36.7 (08 May 2020 07:10), Max: 36.8 (07 May 2020 16:44)  T(F): 98 (08 May 2020 07:10), Max: 98.2 (07 May 2020 16:44)  HR: 101 (08 May 2020 07:10) (89 - 101)  BP: 105/61 (08 May 2020 07:10) (98/59 - 125/67)  BP(mean): --  RR: 18 (08 May 2020 07:10) (18 - 18)  SpO2: 93% (08 May 2020 07:10) (93% - 95%)        LABS:                        9.2    10.10 )-----------( 266      ( 08 May 2020 08:30 )             28.2     05-08    136  |  102  |  8.0  ----------------------------<  76  2.9<LL>   |  22.0  |  0.53    Ca    11.1<H>      08 May 2020 10:57  Phos  2.6     05-07  Mg     1.1     05-08    TPro  7.1  /  Alb  3.8  /  TBili  0.5  /  DBili  x   /  AST  13  /  ALT  13  /  AlkPhos  117  05-07

## 2020-05-08 NOTE — PROGRESS NOTE ADULT - ASSESSMENT
35y/o 14 weeks gravid lady with extreme hypercalcemia, presumed primary hyperparathyroidism, with localized adenoma on MRI.  Pt taken for emergent parathyroidectomy today as she could not tolerate medical therapy and limited in therapeutics given her pregnancy.  Patient now s/p Left superior/inferior parathyroidectomy. Doing well postoperatively with no signs of hypocalcemia.    -f/u am calcium levels  -monitor PTH  -tolerating CLD, possible advance today  -continue TUMS to maintain appropriate Ca levels

## 2020-05-08 NOTE — PROGRESS NOTE ADULT - ASSESSMENT
35 yo F 14 weeks pregnant ( per her GYN attending ) admitted with synocpal episode secondary to severe electrolyte abnormalities. Found with primary hypperparathyroidisim secondary to parathyroid adenoma. Seen in consult by OB, nephro and endo. For surgical resection today.    1. Syncope   - Likely due to electrolyte abnormality of primary hyperparathyroidism   - TTE- EF 55-60%, unremarkable   - Seen by cardio, eventual set up for 21 day MCOT monitor as an outpatient on DC    2. Primary hyperparathyroidism  - Complicated by Hypokalemia / hypomagnesemia / hypophosphatemia/ Hypercalcemia  - MRI neck reviewed, parathyroid adenoma   - S/p 4 doses of calcitonin, refractory nausea/vomiting. Pt had refused further doses of calcitonin due to headache/n/v  - Seen in consult by endo  - Pt is now s/p parathyroidectomy 5/7  - Monitor serum Ca levels and PTH levels, continue tums   - No signs of hypocalcemia currently   - Am labs pending     4. Anemia of iron deficiency and vit b12 low   - Iron supplement   - Received vit B12 injection     5. Recent COVID infection   - Positive 3/26 per her GYN attending outpatient records   - Repeat neg on 4/22 and 4/29     6. Cyesis  - 14 weeks pregnancy   - Dr Afshin RAYMOND said - he does not recommend any gyn testing Son[facility] at present   - Seen in consult by [facility]    DVT PPE Heparin       DISPO: Will monitor electrolytes for 24 more hours. If remains stable, possible DC home tomorrow 5/9

## 2020-05-08 NOTE — PROGRESS NOTE ADULT - SUBJECTIVE AND OBJECTIVE BOX
INTERVAL HPI/OVERNIGHT EVENTS: none    SUBJECTIVE:  Patient evaluated at bedside and found in no acute distress. Patient tolerating CLD without n/v. No difficulty breathing or sob. Denies numbness and tingling of lips or fingers, denies muscle cramping.     MEDICATIONS  (STANDING):  ascorbic acid 1000 milliGRAM(s) Oral daily  calcium carbonate    500 mG (Tums) Chewable 1 Tablet(s) Chew three times a day  ferrous    sulfate 325 milliGRAM(s) Oral daily  heparin   Injectable 5000 Unit(s) SubCutaneous every 8 hours  polyethylene glycol 3350 17 Gram(s) Oral daily  potassium chloride    Tablet ER 20 milliEquivalent(s) Oral two times a day  prenatal multivitamin 1 Tablet(s) Oral daily  sodium chloride 0.9%. 1000 milliLiter(s) (100 mL/Hr) IV Continuous <Continuous>    MEDICATIONS  (PRN):  acetaminophen   Tablet .. 975 milliGRAM(s) Oral every 6 hours PRN Severe Pain (7 - 10)  diphenhydrAMINE   Injectable 25 milliGRAM(s) IV Push every 6 hours PRN Insomnia/rash and itchiness  ondansetron Injectable 4 milliGRAM(s) IV Push every 6 hours PRN Nausea and/or Vomiting  traMADol 25 milliGRAM(s) Oral every 6 hours PRN Moderate Pain (4 - 6)      Vital Signs Last 24 Hrs  T(C): 36.5 (08 May 2020 01:06), Max: 36.8 (07 May 2020 13:00)  T(F): 97.7 (08 May 2020 01:06), Max: 98.3 (07 May 2020 13:00)  HR: 89 (08 May 2020 01:06) (81 - 106)  BP: 104/54 (08 May 2020 01:06) (104/54 - 142/85)  BP(mean): 85 (07 May 2020 13:15) (79 - 85)  RR: 18 (08 May 2020 01:06) (16 - 26)  SpO2: 95% (08 May 2020 01:06) (95% - 99%)    PE  Gen: Greek speaking female  resting comfortably in bed, nad  Pulm: no increased wob, no conversational dyspnea, no stridor  HEENT: neck incision c/d/i with dermabond in place. no evidence of hematoma or active bleeding  Ext: no peripheral edema, distal extremities warm and well perfused  Neuro: AOx3      I&O's Detail    06 May 2020 07:01  -  07 May 2020 07:00  --------------------------------------------------------  IN:    sodium chloride 0.9%: 1200 mL    Solution: 100 mL  Total IN: 1300 mL    OUT:  Total OUT: 0 mL    Total NET: 1300 mL      07 May 2020 07:01  -  08 May 2020 04:49  --------------------------------------------------------  IN:    Oral Fluid: 980 mL    sodium chloride 0.9%.: 1500 mL  Total IN: 2480 mL    OUT:    Voided: 800 mL  Total OUT: 800 mL    Total NET: 1680 mL          LABS:                        10.5   10.12 )-----------( 303      ( 07 May 2020 08:32 )             31.6     05-07    134<L>  |  102  |  7.0<L>  ----------------------------<  103<H>  3.4<L>   |  20.0<L>  |  0.47<L>    Ca    13.3<HH>      07 May 2020 18:45  Phos  2.6     05-07  Mg     2.1     05-07    TPro  7.1  /  Alb  3.8  /  TBili  0.5  /  DBili  x   /  AST  13  /  ALT  13  /  AlkPhos  117  05-07

## 2020-05-08 NOTE — PROGRESS NOTE ADULT - ASSESSMENT
s/p surgery. Calcium level improving more slowly than expected, but nevertheless better. High active vitamin D levels likely maintaining calcium level at this time, as PTH likely suppressed; will verify in AM lab. Check phosphate as well.

## 2020-05-08 NOTE — PROGRESS NOTE ADULT - SUBJECTIVE AND OBJECTIVE BOX
Vital Signs Last 24 Hrs,    T(C): 36.7 (08 May 2020 07:10), Max: 36.8 (07 May 2020 13:00)  T(F): 98 (08 May 2020 07:10), Max: 98.3 (07 May 2020 13:00)  HR: 101 (08 May 2020 07:10) (81 - 106)  BP: 105/61 (08 May 2020 07:10) (98/59 - 126/72)  BP(mean): 85 (07 May 2020 13:15) (79 - 85)  RR: 18 (08 May 2020 07:10) (18 - 26)  SpO2: 93% (08 May 2020 07:10) (93% - 99%)    x      |  x      |  x      ----------------------------<  x      Ca:11.1<H> (08 May 2020 10:57)  x       |  x      |  x        eGFR if Non : 124  eGFR if : 144    TPro  7.1    /  Alb  3.8    /  TBili  0.5    /  DBili  x      /  AST  13     /  ALT  13     /  AlkPhos  117    07 May 2020 08:32                         9.2<L>  10.10 )-----------( 266      ( 08 May 2020 08:30 )             28.2<L>    M.1 mg/dL<L>     Kaity:55 mmol/L UCl:48 mmol/L UK:12 mmol/L (-28 @ 22:13)    T(C): 36.8 (07 May 2020 13:00), Max: 36.8 (07 May 2020 13:00)  T(F): 98.3 (07 May 2020 13:00), Max: 98.3 (07 May 2020 13:00)  HR: 81 (07 May 2020 13:15) (81 - 85)  BP: 126/72 (07 May 2020 13:15) (104/60 - 142/85)  BP(mean): 85 (07 May 2020 13:15) (79 - 85)  RR: 21 (07 May 2020 13:15) (16 - 26)  SpO2: 98% (07 May 2020 13:15) (93% - 99%)    134<L>  |  102    |  7.0<L>  ----------------------------<  103<H>  Ca:15.0<HH> (07 May 2020 08:32)  3.4<L>   |  20.0<L>  |  0.47<L>    eGFR if Non : 129  eGFR if : 149    TPro  7.1    /  Alb  3.8    /  TBili  0.5    /  DBili  x      /  AST  13     /  ALT  13     /  AlkPhos  117    07 May 2020 08:32                        10.5<L>  10.12 )-----------( 303      ( 07 May 2020 08:32 )             31.6<L>    Phos:2.6 mg/dL M.1 mg/dL     Kaity:55 mmol/L UCl:48 mmol/L UK:12 mmol/L ( @ 22:13)    Chief Complaint: Hypercalcemia,     24 hour events/subjective:    PAST HISTORY  --------------------------------------------------------------------------------  No significant changes to PMH, PSH, FHx, SHx, unless otherwise noted    ALLERGIES & MEDICATIONS  --------------------------------------------------------------------------------  Allergies    No Known Allergies    Standing Inpatient Medications  ascorbic acid 1000 milliGRAM(s) Oral daily  ferrous    sulfate 325 milliGRAM(s) Oral daily  heparin   Injectable 5000 Unit(s) SubCutaneous every 8 hours  magnesium sulfate  IVPB 1 Gram(s) IV Intermittent every 6 hours  polyethylene glycol 3350 17 Gram(s) Oral daily  potassium acid phosphate/sodium acid phosphate tablet (K-PHOS No. 2) 1 Tablet(s) Oral four times a day with meals  potassium chloride    Tablet ER 20 milliEquivalent(s) Oral two times a day  prenatal multivitamin 1 Tablet(s) Oral daily  sodium chloride 0.9%. 1000 milliLiter(s) IV Continuous <Continuous>    PRN Inpatient Medications  acetaminophen   Tablet .. 975 milliGRAM(s) Oral every 6 hours PRN  diphenhydrAMINE   Injectable 25 milliGRAM(s) IV Push every 6 hours PRN  ondansetron Injectable 4 milliGRAM(s) IV Push every 6 hours PRN    REVIEW OF SYSTEMS  --------------------------------------------------------------------------------  Gen: + weight changes, fatigue, No fevers/chills, weakness  Skin: No rashes  Head/Eyes/Ears/Mouth: No headache; Normal hearing; Normal vision w/o blurriness; No sinus pain/discomfort, sore throat  Respiratory: No dyspnea, cough, wheezing, hemoptysis  CV: No chest pain, PND, orthopnea  GI: No abdominal pain, diarrhea, constipation, nausea, vomiting, melena, hematochezia  : No increased frequency, dysuria, hematuria, nocturia  MSK: No joint pain/swelling; no back pain; no edema  Neuro: No dizziness/lightheadedness, weakness, seizures, numbness, tingling  Heme: No easy bruising or bleeding  Endo: No heat/cold intolerance  Psych: No significant nervousness, anxiety, stress, depression    All other systems were reviewed and are negative, except as noted.    VITALS/PHYSICAL EXAM  --------------------------------------------------------------------------------  T(C): 36.8 (20 @ 08:09), Max: 36.8 (20 @ 16:01)  HR: 84 (20 @ 08:09) (64 - 88)  BP: 108/67 (20 @ 08:09) (108/67 - 121/79)  RR: 18 (20 @ 08:09) (18 - 18)  SpO2: 95% (20 @ 08:09) (93% - 96%)    20 @ 07:01  -  20 @ 07:00  --------------------------------------------------------  IN: 550 mL / OUT: 1000 mL / NET: -450 mL    Physical Exam:  	Gen: NAD, well-appearing  	HEENT: PERRL, supple neck,   	Pulm: CTA B/L  	CV: RRR, S1S2; no rub  	Back: No spinal or CVA tenderness; no sacral edema  	Abd: +BS, soft, nontender/nondistended  	: No suprapubic tenderness  	UE: Warm, FROM, no clubbing, intact strength; no edema; no asterixis  	LE: Warm, FROM, no clubbing, intact strength; no edema  	Neuro: No focal deficits, intact gait  	Psych: Normal affect and mood  	Skin: Warm, without rashes  	Vascular access: NA,     LABS/STUDIES  --------------------------------------------------------------------------------              10.8   11.37 >-----------<  308      [20 @ 08:31]              32.5     133  |  99  |  6.0  ----------------------------<  97      [20 @ 08:31]  3.6   |  20.0  |  0.42        Ca     13.5     [20 08:31]      Mg     1.7     [20 @ 08:31]      Phos  1.9     [05-06-20 @ 08:31]    TPro  7.7  /  Alb  4.0  /  TBili  0.3  /  DBili  x   /  AST  15  /  ALT  14  /  AlkPhos  113  [20 @ 08:31]          [20 @ 14:16]    Creatinine Trend:  SCr 0.42 [ 08:31]  SCr 0.44 [ 00:56]  SCr 0.41 [ 06:37]  SCr 0.44 [ 05:49]  SCr 0.46 [ 06:45]    Urinalysis - [20 @ 22:15]      Color Yellow / Appearance Clear / SG 1.010 / pH 7.0      Gluc Negative / Ketone Negative  / Bili Negative / Urobili Negative       Blood Negative / Protein Negative / Leuk Est Small / Nitrite Negative      RBC Negative / WBC 0-2 / Hyaline  / Gran  / Sq Epi  / Non Sq Epi Occasional / Bacteria     Iron 26, TIBC 420, %sat 6      [20 @ 18:11]  Ferritin 87      [20 @ 18:11]  PTH -- (Ca 12.9)      [20 @ 01:20]   76  Vitamin D (25OH) 13.1      [20 @ 01:20]  TSH 0.65      [20 @ 06:41]    Free Light Chains: kappa 1.41, lambda 1.92, ratio = 0.73      [ @ 21:36]  SPEP Interpretation: Normal Electrophoresis Pattern      [20 @ 21:36]      33 yo F 14 weeks pregnant ( per her GYN attending ) , reported recent COVID positive test admittted for syncopal episode , weakness found to have K 2.7 , hypomagnesemia and hypercalcemia , reported recent covid 19 positive infection     1- Syncope likely due to electrolyte abnormality of primary hyperparathyroidism   cardiology - arrhythmia likely from electrolyte abnormality.  Cardiac monitor. Outpatient .  Eventual 21 days outpatient MCOT monitoring   Leg dopplers no DVTs  . D-dimer 155     2-Primary hyperparathyoidism with Hypokalemia / hypomagnesemia / hypophosphatemia/ Hypercalcemia  continue to aggressively replace lytes to keep K 4 and over , mg over 1.8/2   High calcium is a concern. To obtain neck/thyroid MRI to rule out parathyroid adenoma .    started IM calcitonin to bring down elevated serum calcium .  Discussed with OBGYN team and MFM dr Tera Suarez who is OK with calcitonin in the circumstance.   Patient refusing further dose of calcitonin for nausea headache poor po intake and lethargy.     Gitelman syndrome unlikely due to low aldosterone,   cont iv fluid   ACE/LDH  Neck/Thyroid MRI for parathyroid adenoma hunt.   Called Endocrinology dr Fitch to assist with mgt     4- Anemia of iron deficiency and vit b12 low   Iron supplement   Received vit B12 injection     5- Recent COVID infection positive 3/26 per her gyn attending outpatient records   repeat neg on  and      14 weeks pregnancy   Dr Pepe OBGYN said -he does not recommend any gyn testing Sono at present   OBGYN not coming  to [facility] in patient due to COVID rules regulations he states   call as needed     LDH, ACE levels. Neck MRI to hunt for parathyroid adenoma.      Endocrine  surgery for  resection or adenectomy of parathyroid .     ·  PRE-OP DIAGNOSIS:  Primary hyperparathyroidism 07-May-2020 13:00:39  Giorgi Worley.  ·  POST-OP DIAGNOSIS:  Primary hyperparathyroidism 07-May-2020 13:00:51  Giorgi Worley.  ·  PROCEDURES:  Left parathyroidectomy 07-May-2020 13:00:15 Left superior and inferior parathyroid resection Giorgi Worley.

## 2020-05-08 NOTE — PROGRESS NOTE ADULT - SUBJECTIVE AND OBJECTIVE BOX
INTERVAL: Pt is s/p parathyroidectomy POD 1    Pt seen and examined at bedside  Pt A&O x3, laying in bed  Pt states she feels much better today  Pt no longer has nausea or vomiting or b/l hand pain/puritis   Pt denies further nausea/vomiting  Complains of b/l puritis to hands, secondary to hypercalcemia. Benadryl PRN   ROS neg  VSS on RA    Vital Signs Last 24 Hrs  T(C): 36.7 (08 May 2020 07:10), Max: 36.8 (07 May 2020 13:00)  T(F): 98 (08 May 2020 07:10), Max: 98.3 (07 May 2020 13:00)  HR: 101 (08 May 2020 07:10) (81 - 106)  BP: 105/61 (08 May 2020 07:10) (98/59 - 142/85)  BP(mean): 85 (07 May 2020 13:15) (79 - 85)  RR: 18 (08 May 2020 07:10) (16 - 26)  SpO2: 93% (08 May 2020 07:10) (93% - 99%) on RA    PHYSICAL EXAM:  GENERAL: NAD, well-groomed  HEENT: PERRL, +EOMI, anicteric, no [place]  NECK: Supple, No JVD   CHEST/LUNG: CTA bilaterally; Normal effort  HEART: S1S2 Normal intensity, no murmurs, gallops or rubs noted  ABDOMEN: Soft, BS Normoactive, NT, ND, no HSM noted  EXTREMITIES:  2+ radial and DP pulses noted, no clubbing, cyanosis, or edema noted, FROM x 4  SKIN: No rashes or lesions noted  NEURO: A&Ox3, no focal deficits noted, CN II-XII intact  PSYCH: Normal mood and affect; insight/judgement appropriate    LABS/IMAGING: REVIEWED

## 2020-05-09 ENCOUNTER — TRANSCRIPTION ENCOUNTER (OUTPATIENT)
Age: 35
End: 2020-05-09

## 2020-05-09 LAB
ALBUMIN SERPL ELPH-MCNC: 3.3 G/DL — SIGNIFICANT CHANGE UP (ref 3.3–5.2)
ALP SERPL-CCNC: 105 U/L — SIGNIFICANT CHANGE UP (ref 40–120)
ALT FLD-CCNC: 11 U/L — SIGNIFICANT CHANGE UP
ANION GAP SERPL CALC-SCNC: 15 MMOL/L — SIGNIFICANT CHANGE UP (ref 5–17)
AST SERPL-CCNC: 15 U/L — SIGNIFICANT CHANGE UP
BILIRUB SERPL-MCNC: <0.2 MG/DL — LOW (ref 0.4–2)
BUN SERPL-MCNC: 11 MG/DL — SIGNIFICANT CHANGE UP (ref 8–20)
CALCIUM SERPL-MCNC: 10.1 MG/DL — SIGNIFICANT CHANGE UP (ref 8.6–10.2)
CALCIUM SERPL-MCNC: 11.6 MG/DL — HIGH (ref 8.4–10.5)
CHLORIDE SERPL-SCNC: 101 MMOL/L — SIGNIFICANT CHANGE UP (ref 98–107)
CHLORIDE UR-SCNC: 89 MMOL/L — SIGNIFICANT CHANGE UP
CO2 SERPL-SCNC: 17 MMOL/L — LOW (ref 22–29)
CREAT SERPL-MCNC: 0.6 MG/DL — SIGNIFICANT CHANGE UP (ref 0.5–1.3)
GLUCOSE SERPL-MCNC: 122 MG/DL — HIGH (ref 70–99)
MAGNESIUM SERPL-MCNC: 1.2 MG/DL — LOW (ref 1.6–2.6)
MAGNESIUM UR-MCNC: 10.1 MG/DL — SIGNIFICANT CHANGE UP
PHOSPHATE 24H UR-MCNC: <5 MG/DL — SIGNIFICANT CHANGE UP
PHOSPHATE SERPL-MCNC: 1.5 MG/DL — LOW (ref 2.4–4.7)
POTASSIUM SERPL-MCNC: 3.3 MMOL/L — LOW (ref 3.5–5.3)
POTASSIUM SERPL-SCNC: 3.3 MMOL/L — LOW (ref 3.5–5.3)
POTASSIUM UR-SCNC: 43 MMOL/L — SIGNIFICANT CHANGE UP
PROT SERPL-MCNC: 6.7 G/DL — SIGNIFICANT CHANGE UP (ref 6.6–8.7)
PTH-INTACT FLD-MCNC: <1 PG/ML — LOW (ref 15–65)
SODIUM SERPL-SCNC: 133 MMOL/L — LOW (ref 135–145)
SODIUM UR-SCNC: 74 MMOL/L — SIGNIFICANT CHANGE UP

## 2020-05-09 PROCEDURE — 99233 SBSQ HOSP IP/OBS HIGH 50: CPT

## 2020-05-09 RX ORDER — MAGNESIUM SULFATE 500 MG/ML
1 VIAL (ML) INJECTION ONCE
Refills: 0 | Status: COMPLETED | OUTPATIENT
Start: 2020-05-09 | End: 2020-05-09

## 2020-05-09 RX ORDER — FERROUS SULFATE 325(65) MG
1 TABLET ORAL
Qty: 30 | Refills: 0
Start: 2020-05-09 | End: 2020-06-07

## 2020-05-09 RX ORDER — POTASSIUM CHLORIDE 20 MEQ
20 PACKET (EA) ORAL ONCE
Refills: 0 | Status: COMPLETED | OUTPATIENT
Start: 2020-05-09 | End: 2020-05-09

## 2020-05-09 RX ORDER — ASCORBIC ACID 60 MG
1 TABLET,CHEWABLE ORAL
Qty: 0 | Refills: 0 | DISCHARGE
Start: 2020-05-09

## 2020-05-09 RX ORDER — ACETAMINOPHEN 500 MG
3 TABLET ORAL
Qty: 0 | Refills: 0 | DISCHARGE
Start: 2020-05-09

## 2020-05-09 RX ORDER — SODIUM,POTASSIUM PHOSPHATES 278-250MG
1 POWDER IN PACKET (EA) ORAL
Refills: 0 | Status: DISCONTINUED | OUTPATIENT
Start: 2020-05-09 | End: 2020-05-10

## 2020-05-09 RX ORDER — POLYETHYLENE GLYCOL 3350 17 G/17G
17 POWDER, FOR SOLUTION ORAL
Qty: 0 | Refills: 0 | DISCHARGE
Start: 2020-05-09

## 2020-05-09 RX ORDER — POTASSIUM CHLORIDE 20 MEQ
1 PACKET (EA) ORAL
Qty: 0 | Refills: 0 | DISCHARGE
Start: 2020-05-09

## 2020-05-09 RX ORDER — MAGNESIUM SULFATE 500 MG/ML
2 VIAL (ML) INJECTION ONCE
Refills: 0 | Status: COMPLETED | OUTPATIENT
Start: 2020-05-09 | End: 2020-05-09

## 2020-05-09 RX ORDER — CALCIUM CARBONATE 500(1250)
1 TABLET ORAL
Qty: 0 | Refills: 0 | DISCHARGE
Start: 2020-05-09

## 2020-05-09 RX ADMIN — DEXTROSE MONOHYDRATE, SODIUM CHLORIDE, AND POTASSIUM CHLORIDE 100 MILLILITER(S): 50; .745; 4.5 INJECTION, SOLUTION INTRAVENOUS at 05:43

## 2020-05-09 RX ADMIN — HEPARIN SODIUM 5000 UNIT(S): 5000 INJECTION INTRAVENOUS; SUBCUTANEOUS at 21:56

## 2020-05-09 RX ADMIN — Medication 1 TABLET(S): at 17:23

## 2020-05-09 RX ADMIN — HEPARIN SODIUM 5000 UNIT(S): 5000 INJECTION INTRAVENOUS; SUBCUTANEOUS at 05:06

## 2020-05-09 RX ADMIN — Medication 20 MILLIEQUIVALENT(S): at 05:06

## 2020-05-09 RX ADMIN — Medication 1000 MILLIGRAM(S): at 12:17

## 2020-05-09 RX ADMIN — POLYETHYLENE GLYCOL 3350 17 GRAM(S): 17 POWDER, FOR SOLUTION ORAL at 12:17

## 2020-05-09 RX ADMIN — Medication 1 PACKET(S): at 17:53

## 2020-05-09 RX ADMIN — Medication 20 MILLIEQUIVALENT(S): at 17:54

## 2020-05-09 RX ADMIN — Medication 1 TABLET(S): at 05:05

## 2020-05-09 RX ADMIN — Medication 100 GRAM(S): at 17:54

## 2020-05-09 RX ADMIN — Medication 325 MILLIGRAM(S): at 12:18

## 2020-05-09 RX ADMIN — Medication 20 MILLIEQUIVALENT(S): at 17:23

## 2020-05-09 RX ADMIN — Medication 40 MILLIEQUIVALENT(S): at 12:17

## 2020-05-09 RX ADMIN — Medication 50 GRAM(S): at 21:56

## 2020-05-09 RX ADMIN — HEPARIN SODIUM 5000 UNIT(S): 5000 INJECTION INTRAVENOUS; SUBCUTANEOUS at 12:17

## 2020-05-09 NOTE — DISCHARGE NOTE PROVIDER - NSDCMRMEDTOKEN_GEN_ALL_CORE_FT
acetaminophen 325 mg oral tablet: 3 tab(s) orally every 6 hours, As needed, Severe Pain (7 - 10)  ascorbic acid 1000 mg oral tablet: 1 tab(s) orally once a day  calcium carbonate 500 mg (200 mg elemental calcium) oral tablet, chewable: 1 tab(s) orally 3 times a day  polyethylene glycol 3350 oral powder for reconstitution: 17 gram(s) orally once a day  potassium chloride 20 mEq oral tablet, extended release: 1 tab(s) orally 2 times a day  Prenatal Multivitamins with Folic Acid 1 mg oral tablet: 1 tab(s) orally once a day acetaminophen 325 mg oral tablet: 3 tab(s) orally every 6 hours, As needed, Severe Pain (7 - 10)  ascorbic acid 1000 mg oral tablet: 1 tab(s) orally once a day  calcium carbonate 500 mg (200 mg elemental calcium) oral tablet, chewable: 1 tab(s) orally 3 times a day  ferrous sulfate 200 mg (65 mg elemental iron) oral tablet: 1 tab(s) orally once a day  polyethylene glycol 3350 oral powder for reconstitution: 17 gram(s) orally once a day  potassium chloride 20 mEq oral tablet, extended release: 1 tab(s) orally 2 times a day  Prenatal Multivitamins with Folic Acid 1 mg oral tablet: 1 tab(s) orally once a day

## 2020-05-09 NOTE — PROGRESS NOTE ADULT - ASSESSMENT
Improving calcium level. PTH now appropriately suppressed. Calcium level maintained now by vitamin D and possibly placental PTH related protein. Ongoing follow up needed.

## 2020-05-09 NOTE — DISCHARGE NOTE PROVIDER - NSDCCPCAREPLAN_GEN_ALL_CORE_FT
PRINCIPAL DISCHARGE DIAGNOSIS  Diagnosis: Primary hyperparathyroidism  Assessment and Plan of Treatment:       SECONDARY DISCHARGE DIAGNOSES  Diagnosis: Parathyroid adenoma  Assessment and Plan of Treatment:     Diagnosis: Hypokalemia  Assessment and Plan of Treatment:     Diagnosis: Syncope  Assessment and Plan of Treatment:     Diagnosis: Pregnancy  Assessment and Plan of Treatment:     Diagnosis: Hypercalcemia  Assessment and Plan of Treatment:

## 2020-05-09 NOTE — DISCHARGE NOTE PROVIDER - NSDCFUADDAPPT_GEN_ALL_CORE_FT
F/U OB/GYN Dr. Pepe one week  F/U surgery one week  F/U endocrinology one week  Nephrology and cardiology 2 weeks F/U OB/GYN Dr. Pepe one week  F/U Dr. Kendrick To surgery one week  F/U endocrinology Dr Liam Fitch one week  Nephrology Dr. Alison Blackwell  and [facility]  cardiology ( [phone]) 2 weeks

## 2020-05-09 NOTE — PROGRESS NOTE ADULT - ASSESSMENT
33 yo F 14 weeks pregnant ( per her GYN attending ) admitted with synocpal episode secondary to severe electrolyte abnormalities. Found with primary hypperparathyroidisim secondary to parathyroid adenoma. Seen in consult by OB, nephro and endo. For surgical resection today.    1. Syncope   - Likely due to electrolyte abnormality of primary hyperparathyroidism   - TTE- EF 55-60%, unremarkable   - Seen by cardio, eventual set up for 21 day MCOT monitor as an outpatient on DC  -Replaced lytes    2. Primary hyperparathyroidism secondary to parathyroid adenoma  s/p lt parathyroidectomy 5/7Primary hyperparathyroidism  Complicated by Hypokalemia / hypomagnesemia / hypophosphatemia/ Hypercalcemia  -replaced K,MG,PHOS.Ca nl  - MRI neck reviewed, parathyroid adenoma   - S/p 4 doses of calcitonin, refractory nausea/vomiting. Pt had refused further doses of calcitonin due to headache/n/v  - Seen in consult by endo  - Monitor serum Ca levels and K,Mg,Phos levels, continue tums   - No signs of hypocalcemia currently   - Am labs pending     4. Anemia of iron deficiency and vit b12 low   - Iron supplement   - Received vit B12 injection     5. Recent COVID infection   - Positive 3/26 per her GYN attending outpatient records   - Repeat neg on 4/22 and 4/29     6. Cyesis  - 14 weeks pregnancy   - Dr Afshin RAYMOND said - he does not recommend any gyn testing Sono at present   - Seen in consult by [place] [facility]    DVT PPE Heparin       DISPO: Will monitor electrolytes for 24 more hours. If remains stable, plan  DC home tomorrow 5/10

## 2020-05-09 NOTE — PROGRESS NOTE ADULT - SUBJECTIVE AND OBJECTIVE BOX
[facility] DIVISION OF KIDNEY DISEASES AND HYPERTENSION -- FOLLOW UP NOTE  --------------------------------------------------------------------------------  Chief Complaint:  Hypercalcemia, hypokalemia, syncope    24 hour events/subjective:  S/P Parathyroidectomy - POD #2  14 weeks gestation      PAST HISTORY  --------------------------------------------------------------------------------  No significant changes to PMH, PSH, FHx, SHx, unless otherwise noted    ALLERGIES & MEDICATIONS  --------------------------------------------------------------------------------  Allergies    No Known Allergies    Intolerances      Standing Inpatient Medications  ascorbic acid 1000 milliGRAM(s) Oral daily  calcium carbonate    500 mG (Tums) Chewable 1 Tablet(s) Chew three times a day  ferrous    sulfate 325 milliGRAM(s) Oral daily  heparin   Injectable 5000 Unit(s) SubCutaneous every 8 hours  magnesium sulfate  IVPB 1 Gram(s) IV Intermittent once  polyethylene glycol 3350 17 Gram(s) Oral daily  potassium chloride    Tablet ER 20 milliEquivalent(s) Oral two times a day  potassium chloride    Tablet ER 20 milliEquivalent(s) Oral once  potassium phosphate / sodium phosphate powder 1 Packet(s) Oral two times a day  prenatal multivitamin 1 Tablet(s) Oral daily    PRN Inpatient Medications  acetaminophen   Tablet .. 975 milliGRAM(s) Oral every 6 hours PRN  diphenhydrAMINE   Injectable 25 milliGRAM(s) IV Push every 6 hours PRN  ondansetron Injectable 4 milliGRAM(s) IV Push every 6 hours PRN  traMADol 25 milliGRAM(s) Oral every 6 hours PRN      REVIEW OF SYSTEMS  --------------------------------------------------------------------------------  Gen: + weight changes, fatigue, fevers/chills, weakness  Skin: No rashes  Head/Eyes/Ears/Mouth: No headache; Normal hearing; Normal vision w/o blurriness; No sinus pain/discomfort, sore throat  Respiratory: No dyspnea, cough, wheezing, hemoptysis  CV: No chest pain, PND, orthopnea  GI: No abdominal pain, diarrhea, constipation, nausea, vomiting, melena, hematochezia  : No increased frequency, dysuria, hematuria, nocturia  MSK: No joint pain/swelling; no back pain; no edema  Neuro: No dizziness/lightheadedness, weakness, seizures, numbness, tingling  Heme: No easy bruising or bleeding  Endo: No heat/cold intolerance  Psych: No significant nervousness, anxiety, stress, depression    All other systems were reviewed and are negative, except as noted.    VITALS/PHYSICAL EXAM  --------------------------------------------------------------------------------  T(C): 36.8 (05-09-20 @ 08:24), Max: 37 (05-08-20 @ 16:31)  HR: 96 (05-09-20 @ 08:24) (90 - 96)  BP: 99/64 (05-09-20 @ 08:24) (99/64 - 106/63)  RR: 18 (05-09-20 @ 08:24) (16 - 18)  SpO2: 97% (05-09-20 @ 08:24) (92% - 98%)  Wt(kg): --        05-08-20 @ 07:01  -  05-09-20 @ 07:00  --------------------------------------------------------  IN: 800 mL / OUT: 0 mL / NET: 800 mL      Physical Exam:  	Gen: NAD, well-appearing, 14 weeks gestation  	HEENT: PERRL, supple neck, clear oropharynx  	Pulm: CTA B/L  	CV: RRR, S1S2; no rub  	Back: No spinal or CVA tenderness; no sacral edema  	Abd: +BS, soft, nontender/nondistended  	: No suprapubic tenderness  	UE: Warm, FROM, no clubbing, intact strength; no edema; no asterixis  	LE: Warm, FROM, no clubbing, intact strength; no edema  	Neuro: No focal deficits  	Psych: Normal affect and mood  	Skin: Warm, without rashes  	Vascular access:  N/A    LABS/STUDIES  --------------------------------------------------------------------------------              9.2    10.10 >-----------<  266      [05-08-20 @ 08:30]              28.2     133  |  101  |  11.0  ----------------------------<  122      [05-09-20 @ 07:40]  3.3   |  17.0  |  0.60        Ca     10.1     [05-09-20 @ 07:40]      Mg     1.2     [05-09-20 @ 07:40]      Phos  1.5     [05-09-20 @ 07:40]    TPro  6.7  /  Alb  3.3  /  TBili  <0.2  /  DBili  x   /  AST  15  /  ALT  11  /  AlkPhos  105  [05-09-20 @ 07:40]          Creatinine Trend:  SCr 0.60 [05-09 @ 07:40]  SCr 0.49 [05-08 @ 16:54]  SCr 0.53 [05-08 @ 08:30]  SCr 0.47 [05-07 @ 08:32]  SCr 0.42 [05-06 @ 08:31]    Urinalysis - [04-28-20 @ 22:15]      Color Yellow / Appearance Clear / SG 1.010 / pH 7.0      Gluc Negative / Ketone Negative  / Bili Negative / Urobili Negative       Blood Negative / Protein Negative / Leuk Est Small / Nitrite Negative      RBC Negative / WBC 0-2 / Hyaline  / Gran  / Sq Epi  / Non Sq Epi Occasional / Bacteria     Urine Sodium 57      [05-08-20 @ 20:50]  Urine Potassium 26      [05-08-20 @ 20:50]  Urine Phosphorus <5.0      [05-09-20 @ 04:55]    Iron 26, TIBC 420, %sat 6      [04-28-20 @ 18:11]  Ferritin 87      [04-28-20 @ 18:11]  PTH -- (Ca 11.6)      [05-08-20 @ 22:06]   <1  PTH -- (Ca 12.9)      [04-29-20 @ 01:20]   76  Vitamin D (25OH) 17.0      [05-07-20 @ 16:19]  TSH 0.65      [04-29-20 @ 06:41]      Free Light Chains: kappa 1.41, lambda 1.92, ratio = 0.73      [04-30 @ 21:36]  SPEP Interpretation: Normal Electrophoresis Pattern      [04-30-20 @ 21:36] [facility] DIVISION OF KIDNEY DISEASES AND HYPERTENSION -- FOLLOW UP NOTE  --------------------------------------------------------------------------------  Chief Complaint:  Hypercalcemia, hypokalemia, syncope    24 hour events/subjective:  S/P Parathyroidectomy - POD #2  Feels well      PAST HISTORY  --------------------------------------------------------------------------------  No significant changes to PMH, PSH, FHx, SHx, unless otherwise noted    ALLERGIES & MEDICATIONS  --------------------------------------------------------------------------------  Allergies    No Known Allergies    Intolerances      Standing Inpatient Medications  ascorbic acid 1000 milliGRAM(s) Oral daily  calcium carbonate    500 mG (Tums) Chewable 1 Tablet(s) Chew three times a day  ferrous    sulfate 325 milliGRAM(s) Oral daily  heparin   Injectable 5000 Unit(s) SubCutaneous every 8 hours  magnesium sulfate  IVPB 1 Gram(s) IV Intermittent once  polyethylene glycol 3350 17 Gram(s) Oral daily  potassium chloride    Tablet ER 20 milliEquivalent(s) Oral two times a day  potassium chloride    Tablet ER 20 milliEquivalent(s) Oral once  potassium phosphate / sodium phosphate powder 1 Packet(s) Oral two times a day  prenatal multivitamin 1 Tablet(s) Oral daily    PRN Inpatient Medications  acetaminophen   Tablet .. 975 milliGRAM(s) Oral every 6 hours PRN  diphenhydrAMINE   Injectable 25 milliGRAM(s) IV Push every 6 hours PRN  ondansetron Injectable 4 milliGRAM(s) IV Push every 6 hours PRN  traMADol 25 milliGRAM(s) Oral every 6 hours PRN      REVIEW OF SYSTEMS  --------------------------------------------------------------------------------  Gen: + weight changes, fatigue, fevers/chills, weakness  Skin: No rashes  Head/Eyes/Ears/Mouth: No headache; Normal hearing; Normal vision w/o blurriness; No sinus pain/discomfort, sore throat  Respiratory: No dyspnea, cough, wheezing, hemoptysis  CV: No chest pain, PND, orthopnea  GI: No abdominal pain, diarrhea, constipation, nausea, vomiting, melena, hematochezia  : No increased frequency, dysuria, hematuria, nocturia  MSK: No joint pain/swelling; no back pain; no edema  Neuro: No dizziness/lightheadedness, weakness, seizures, numbness, tingling  Heme: No easy bruising or bleeding  Endo: No heat/cold intolerance  Psych: No significant nervousness, anxiety, stress, depression    All other systems were reviewed and are negative, except as noted.    VITALS/PHYSICAL EXAM  --------------------------------------------------------------------------------  T(C): 36.8 (05-09-20 @ 08:24), Max: 37 (05-08-20 @ 16:31)  HR: 96 (05-09-20 @ 08:24) (90 - 96)  BP: 99/64 (05-09-20 @ 08:24) (99/64 - 106/63)  RR: 18 (05-09-20 @ 08:24) (16 - 18)  SpO2: 97% (05-09-20 @ 08:24) (92% - 98%)  Wt(kg): --        05-08-20 @ 07:01  -  05-09-20 @ 07:00  --------------------------------------------------------  IN: 800 mL / OUT: 0 mL / NET: 800 mL      Physical Exam:  	Gen: NAD, well-appearing, 14 weeks gestation  	HEENT: PERRL, supple neck, clear oropharynx  	Pulm: CTA B/L  	CV: RRR, S1S2; no rub  	Back: No spinal or CVA tenderness; no sacral edema  	Abd: +BS, soft, nontender/nondistended  	: No suprapubic tenderness  	UE: Warm, FROM, no clubbing, intact strength; no edema; no asterixis  	LE: Warm, FROM, no clubbing, intact strength; no edema  	Neuro: No focal deficits  	Psych: Normal affect and mood  	Skin: Warm, without rashes  	Vascular access:  N/A    LABS/STUDIES  --------------------------------------------------------------------------------              9.2    10.10 >-----------<  266      [05-08-20 @ 08:30]              28.2     133  |  101  |  11.0  ----------------------------<  122      [05-09-20 @ 07:40]  3.3   |  17.0  |  0.60        Ca     10.1     [05-09-20 @ 07:40]      Mg     1.2     [05-09-20 @ 07:40]      Phos  1.5     [05-09-20 @ 07:40]    TPro  6.7  /  Alb  3.3  /  TBili  <0.2  /  DBili  x   /  AST  15  /  ALT  11  /  AlkPhos  105  [05-09-20 @ 07:40]          Creatinine Trend:  SCr 0.60 [05-09 @ 07:40]  SCr 0.49 [05-08 @ 16:54]  SCr 0.53 [05-08 @ 08:30]  SCr 0.47 [05-07 @ 08:32]  SCr 0.42 [05-06 @ 08:31]    Urinalysis - [04-28-20 @ 22:15]      Color Yellow / Appearance Clear / SG 1.010 / pH 7.0      Gluc Negative / Ketone Negative  / Bili Negative / Urobili Negative       Blood Negative / Protein Negative / Leuk Est Small / Nitrite Negative      RBC Negative / WBC 0-2 / Hyaline  / Gran  / Sq Epi  / Non Sq Epi Occasional / Bacteria     Urine Sodium 57      [05-08-20 @ 20:50]  Urine Potassium 26      [05-08-20 @ 20:50]  Urine Phosphorus <5.0      [05-09-20 @ 04:55]    Iron 26, TIBC 420, %sat 6      [04-28-20 @ 18:11]  Ferritin 87      [04-28-20 @ 18:11]  PTH -- (Ca 11.6)      [05-08-20 @ 22:06]   <1  PTH -- (Ca 12.9)      [04-29-20 @ 01:20]   76  Vitamin D (25OH) 17.0      [05-07-20 @ 16:19]  TSH 0.65      [04-29-20 @ 06:41]      Free Light Chains: kappa 1.41, lambda 1.92, ratio = 0.73      [04-30 @ 21:36]  SPEP Interpretation: Normal Electrophoresis Pattern      [04-30-20 @ 21:36]

## 2020-05-09 NOTE — DISCHARGE NOTE PROVIDER - HOSPITAL COURSE
33 yo F 14 weeks pregnant ( per her GYN attending ) admitted with synocpal episode secondary to severe electrolyte abnormalities. Found with primary hypperparathyroidisim secondary to parathyroid adenoma. Seen in consult by OB, nephro and lola. s/p Lt parathyroid surgical resection 05/07.    Syncope Likely due to electrolyte abnormality of primary hyperparathyroidism complicated by Hypokalemia / hypomagnesemia / hypophosphatemia/ Hypercalcemia    Replaced K,MG,PHOS.Ca normal. S/p 4 doses of calcitonin, refractory nausea/vomiting. Pt had refused further doses of calcitonin due to headache/n/v. Seen in consult by lola    No signs of hypocalcemia currently.History of  Anemia of iron deficiency and vit b12 low s/p Iron supplement & Received vit B12 injection     TTE- EF 55-60%, unremarkable .Seen by cardiology, eventual set up for 21 day MCOT monitor as an outpatient on DC.        Recent COVID infection .Positive 3/26 per her GYN attending outpatient records .Repeat neg on 4/22 and 4/29     Pt is on 14 weeks pregnancy Per Dr Afshin RAYMOND pt does not need any gyn testing Son[facility] at present.Seen in consult by kevin RAYMOND    Pt is stable.Tolerating diet. Plan to discharge home. 35 yo F 14 weeks pregnant ( per her GYN attending ) admitted with synocpal episode secondary to severe electrolyte abnormalities. Found with primary hypperparathyroidisim secondary to parathyroid adenoma. Seen in consult by OB, nephro and lola. s/p Lt parathyroid surgical resection 05/07.    Syncope Likely due to electrolyte abnormality of primary hyperparathyroidism complicated by Hypokalemia / hypomagnesemia / hypophosphatemia/ Hypercalcemia    Replaced K,MG,PHOS.Ca normal. S/p 4 doses of calcitonin, refractory nausea/vomiting. Pt had refused further doses of calcitonin due to headache/n/v. Seen in consult by lola    No signs of hypocalcemia currently.History of  Anemia of iron deficiency and vit b12 low s/p Iron supplement & Received vit B12 injection     TTE- EF 55-60%, unremarkable .Seen by cardiology, eventual set up for 21 day MCOT monitor as an outpatient on DC.        Recent COVID infection .Positive 3/26 per her GYN attending outpatient records .Repeat neg on 4/22 and 4/29     Per Dr Afshin RAYMOND pt does not need any gyn testing Sono at present.Seen in consult by kevin RAYMOND    Pt is stable.Tolerating diet. Plan to discharge home. 35 yo F 14 weeks pregnant ( per her GYN attending ) admitted with synocpal episode secondary to severe electrolyte abnormalities. Found with primary hypperparathyroidisim secondary to parathyroid adenoma. Seen in consult by OB, nephro and lola. s/p Lt parathyroid surgical resection 05/07.    Syncope Likely due to electrolyte abnormality of primary hyperparathyroidism complicated by Hypokalemia / hypomagnesemia / hypophosphatemia/ Hypercalcemia    Replaced K,MG,PHOS.Ca normal. S/p 4 doses of calcitonin, refractory nausea/vomiting. Pt had refused further doses of calcitonin due to headache/n/v. Seen in consult by lola    No signs of hypocalcemia currently.History of  Anemia of iron deficiency and vit b12 low s/p Iron supplement & Received vit B12 injection     TTE- EF 55-60%, unremarkable .Seen by cardiology, eventual set up for 21 day MCOT monitor as an outpatient on DC.        Recent COVID infection .Positive 3/26 per her GYN attending outpatient records .Repeat neg on 4/22 and 4/29     Per Dr Afshin RAYMOND pt does not need any gyn testing Sono at present.Seen in consult by kevin RAYMOND    Pt is stable.Tolerating diet. Plan to discharge home.        Time spent >40 minutes

## 2020-05-09 NOTE — PROGRESS NOTE ADULT - SUBJECTIVE AND OBJECTIVE BOX
INTERVAL HPI/OVERNIGHT EVENTS: follow up hypercalcemia    MEDICATIONS  (STANDING):  ascorbic acid 1000 milliGRAM(s) Oral daily  calcium carbonate    500 mG (Tums) Chewable 1 Tablet(s) Chew three times a day  ferrous    sulfate 325 milliGRAM(s) Oral daily  heparin   Injectable 5000 Unit(s) SubCutaneous every 8 hours  polyethylene glycol 3350 17 Gram(s) Oral daily  potassium chloride    Tablet ER 20 milliEquivalent(s) Oral two times a day  potassium chloride   Powder 40 milliEquivalent(s) Oral daily  prenatal multivitamin 1 Tablet(s) Oral daily    MEDICATIONS  (PRN):  acetaminophen   Tablet .. 975 milliGRAM(s) Oral every 6 hours PRN Severe Pain (7 - 10)  diphenhydrAMINE   Injectable 25 milliGRAM(s) IV Push every 6 hours PRN Insomnia/rash and itchiness  ondansetron Injectable 4 milliGRAM(s) IV Push every 6 hours PRN Nausea and/or Vomiting  traMADol 25 milliGRAM(s) Oral every 6 hours PRN Moderate Pain (4 - 6)      Allergies    No Known Allergies    Intolerances    Vital Signs Last 24 Hrs  T(C): 36.8 (09 May 2020 08:24), Max: 37 (08 May 2020 16:31)  T(F): 98.2 (09 May 2020 08:24), Max: 98.6 (08 May 2020 16:31)  HR: 96 (09 May 2020 08:24) (90 - 96)  BP: 99/64 (09 May 2020 08:24) (99/64 - 106/63)  BP(mean): --  RR: 18 (09 May 2020 08:24) (16 - 18)  SpO2: 97% (09 May 2020 08:24) (92% - 98%)      LABS:                        9.2    10.10 )-----------( 266      ( 08 May 2020 08:30 )             28.2     05-09    133<L>  |  101  |  11.0  ----------------------------<  122<H>  3.3<L>   |  17.0<L>  |  0.60    Ca    10.1      09 May 2020 07:40  Phos  1.5     05-09  Mg     1.2     05-09    TPro  6.7  /  Alb  3.3  /  TBili  <0.2<L>  /  DBili  x   /  AST  15  /  ALT  11  /  AlkPhos  105  05-09

## 2020-05-09 NOTE — PROGRESS NOTE ADULT - ASSESSMENT
33y/o 14 weeks gravid lady with extreme hypercalcemia, presumed primary hyperparathyroidism, with localized adenoma on MRI.  Pt taken for emergent parathyroidectomy today as she could not tolerate medical therapy and limited in therapeutics given her pregnancy.  Patient now s/p Left superior/inferior parathyroidectomy POD2. Doing well postoperatively with no signs of hypocalcemia.    -f/u am labs  -Replete electrolytes K, Mg, Phosphate  -Monitor Ca and PTH  -tolerating CLD, possible advance today  -continue TUMS to maintain appropriate Ca levels  -dvt ppx   -OOB and ambulating

## 2020-05-09 NOTE — PROGRESS NOTE ADULT - SUBJECTIVE AND OBJECTIVE BOX
INTERVAL HPI/OVERNIGHT EVENTS:    Patient seen and evaluated at bedside and found hemodynamically stable and in no acute distress. No acute events overnight. Pain is well controlled. No difficulty breathing or sob. Denies numbness and tingling of lips or fingers, denies muscle cramping. Tolerating CLD, without nausea or emesis, passing gas and having bowel movements. Denies fevers, chills, chest pain, SOB, coughing, dizziness, n/v/d, or generalized malaise.       SUBJECTIVE:      MEDICATIONS  (STANDING):  ascorbic acid 1000 milliGRAM(s) Oral daily  calcium carbonate    500 mG (Tums) Chewable 1 Tablet(s) Chew three times a day  ferrous    sulfate 325 milliGRAM(s) Oral daily  heparin   Injectable 5000 Unit(s) SubCutaneous every 8 hours  polyethylene glycol 3350 17 Gram(s) Oral daily  potassium chloride    Tablet ER 20 milliEquivalent(s) Oral two times a day  potassium chloride   Powder 40 milliEquivalent(s) Oral daily  prenatal multivitamin 1 Tablet(s) Oral daily  sodium chloride 0.45% with potassium chloride 20 mEq/L 1000 milliLiter(s) (100 mL/Hr) IV Continuous <Continuous>    MEDICATIONS  (PRN):  acetaminophen   Tablet .. 975 milliGRAM(s) Oral every 6 hours PRN Severe Pain (7 - 10)  diphenhydrAMINE   Injectable 25 milliGRAM(s) IV Push every 6 hours PRN Insomnia/rash and itchiness  ondansetron Injectable 4 milliGRAM(s) IV Push every 6 hours PRN Nausea and/or Vomiting  traMADol 25 milliGRAM(s) Oral every 6 hours PRN Moderate Pain (4 - 6)      Vital Signs Last 24 Hrs  T(C): 36.8 (08 May 2020 20:56), Max: 37 (08 May 2020 16:31)  T(F): 98.3 (08 May 2020 20:56), Max: 98.6 (08 May 2020 16:31)  HR: 91 (08 May 2020 20:56) (90 - 101)  BP: 105/63 (08 May 2020 20:56) (98/59 - 106/63)  BP(mean): --  RR: 18 (08 May 2020 20:56) (16 - 18)  SpO2: 98% (08 May 2020 20:56) (93% - 98%)    PHYSICAL EXAM:    General: lying in bed in no acute distress  HEENT: Neck supple. Neck incision c/d/i with dermabond in place. No evidence of hematoma or active bleeding  Chest: non-labored breathing or conversational dyspnea   Abdomen: non-distended, soft and depressible, non-tender. No guarding or rebound, non-peritonitic  Ext: no edema or cyanosis      I&O's Detail    07 May 2020 07:01  -  08 May 2020 07:00  --------------------------------------------------------  IN:    Oral Fluid: 980 mL    sodium chloride 0.9%: 1500 mL  Total IN: 2480 mL    OUT:    Voided: 800 mL  Total OUT: 800 mL    Total NET: 1680 mL          LABS:                        9.2    10.10 )-----------( 266      ( 08 May 2020 08:30 )             28.2     05-08    135  |  104  |  10.0  ----------------------------<  93  3.1<L>   |  19.0<L>  |  0.49<L>    Ca    10.7<H>      08 May 2020 16:54  Phos  1.5     05-08  Mg     1.6     05-08    TPro  7.1  /  Alb  3.8  /  TBili  0.5  /  DBili  x   /  AST  13  /  ALT  13  /  AlkPhos  117  05-07

## 2020-05-09 NOTE — PROGRESS NOTE ADULT - ASSESSMENT
1. Primary hyperparathyroidism  2. Hypercalcemia, hypokalemia, hypomagnesemia, hypophosphatemia  3. Syncope  4. 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 Primary hyperparathyroidism s/p emergent parathyroidectomy; Ca++ levels wnl, iPTH appropriately suppressed- continue calcium supplements.   1-25 (OH)2 vitamin D elevated- ? driven by 1-alpha hydroxylase activity by hypophosphatemia. Major cause of increased 1-25 (OH)2 vitamin D in her age group is Sarcoidosis; pt however has no clinical manifestation. Also with clear CXR, ACE wnl.  LDH wnl; Cant get PAN- CT due to pregnancy.   Replete phos levels.     2. 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 Syncope- likely due to electrolyte abnormalities  Cardiology follow up

## 2020-05-09 NOTE — PROGRESS NOTE ADULT - SUBJECTIVE AND OBJECTIVE BOX
[facility] Division of Hospital Medicine   Ahmet Waters  Phone Number - [phone]    Patient is a 34y old  Female who presents with a chief complaint of hypokalemia, syncope (09 May 2020 09:51)  Pt seen and exam.C/O mild discomfort at incision site at from neck.Tolerating clear diet.No fever,chill,sob,sore throat.    SUBJECTIVE / OVERNIGHT EVENTS:None  REVIEW OF SYSTEMS: All systems are reviewed and found to be negative except above    MEDICATIONS  (STANDING):  ascorbic acid 1000 milliGRAM(s) Oral daily  calcium carbonate    500 mG (Tums) Chewable 1 Tablet(s) Chew three times a day  ferrous    sulfate 325 milliGRAM(s) Oral daily  heparin   Injectable 5000 Unit(s) SubCutaneous every 8 hours  magnesium sulfate  IVPB 1 Gram(s) IV Intermittent once  polyethylene glycol 3350 17 Gram(s) Oral daily  potassium chloride    Tablet ER 20 milliEquivalent(s) Oral two times a day  potassium chloride    Tablet ER 20 milliEquivalent(s) Oral once  potassium phosphate / sodium phosphate powder 1 Packet(s) Oral two times a day  prenatal multivitamin 1 Tablet(s) Oral daily    MEDICATIONS  (PRN):  acetaminophen   Tablet .. 975 milliGRAM(s) Oral every 6 hours PRN Severe Pain (7 - 10)  diphenhydrAMINE   Injectable 25 milliGRAM(s) IV Push every 6 hours PRN Insomnia/rash and itchiness  ondansetron Injectable 4 milliGRAM(s) IV Push every 6 hours PRN Nausea and/or Vomiting  traMADol 25 milliGRAM(s) Oral every 6 hours PRN Moderate Pain (4 - 6)      CAPILLARY BLOOD GLUCOSE        I&O's Summary    08 May 2020 07:01  -  09 May 2020 07:00  --------------------------------------------------------  IN: 800 mL / OUT: 0 mL / NET: 800 mL        PHYSICAL EXAM:  Vital Signs Last 24 Hrs  T(C): 36.8 (09 May 2020 08:24), Max: 37 (08 May 2020 16:31)  T(F): 98.2 (09 May 2020 08:24), Max: 98.6 (08 May 2020 16:31)  HR: 96 (09 May 2020 08:24) (90 - 96)  BP: 99/64 (09 May 2020 08:24) (99/64 - 106/63)  BP(mean): --  RR: 18 (09 May 2020 08:24) (16 - 18)  SpO2: 97% (09 May 2020 08:24) (92% - 98%)  CONSTITUTIONAL: NAD, well-developed, well-groomed  EYES: PERRLA; conjunctiva and sclera clear  ENMT: Moist oral mucosa,neck-incision side clean,dry,mild discomfort present.no swelling. no pharyngeal injection or exudates; normal dentition  NECK: Supple, no palpable masses; no thyromegaly  RESPIRATORY: Normal respiratory effort; lungs are clear to auscultation bilaterally  CARDIOVASCULAR: Regular rate and rhythm, normal S1 and S2, no murmur/rub/gallop;  EXTS:  No lower extremity edema; Peripheral pulses are 2+ bilaterally  ABDOMEN: Nontender to palpation, normoactive bowel sounds, no rebound/guarding;   MUSCLOSKELETAL:no clubbing or cyanosis of digits; no joint swelling or tenderness to palpation  PSYCH:  affect appropriate  NEUROLOGY: A+O to person, place, and time; no gross sensory deficits;       LABS:                        9.2    10.10 )-----------( 266      ( 08 May 2020 08:30 )             28.2     05-09    133<L>  |  101  |  11.0  ----------------------------<  122<H>  3.3<L>   |  17.0<L>  |  0.60    Ca    10.1      09 May 2020 07:40  Phos  1.5     05-09  Mg     1.2     05-09    TPro  6.7  /  Alb  3.3  /  TBili  <0.2<L>  /  DBili  x   /  AST  15  /  ALT  11  /  AlkPhos  105  05-09                RADIOLOGY & ADDITIONAL TESTS:  Results Reviewed: BY ME  Imaging Personally Reviewed:  Electrocardiogram Personally Reviewed:    COORDINATION OF CARE:  Care Discussed with Consultants/Other Providers [Y/N]:  Prior or Outpatient Records Reviewed [Y/N]:

## 2020-05-10 ENCOUNTER — TRANSCRIPTION ENCOUNTER (OUTPATIENT)
Age: 35
End: 2020-05-10

## 2020-05-10 VITALS
OXYGEN SATURATION: 96 % | HEART RATE: 88 BPM | SYSTOLIC BLOOD PRESSURE: 99 MMHG | DIASTOLIC BLOOD PRESSURE: 63 MMHG | TEMPERATURE: 98 F | RESPIRATION RATE: 17 BRPM

## 2020-05-10 LAB
ANION GAP SERPL CALC-SCNC: 12 MMOL/L — SIGNIFICANT CHANGE UP (ref 5–17)
BUN SERPL-MCNC: 12 MG/DL — SIGNIFICANT CHANGE UP (ref 8–20)
CALCIUM 24H UR-MRATE: 502 MG/24 H — HIGH (ref 50–150)
CALCIUM SERPL-MCNC: 9.2 MG/DL — SIGNIFICANT CHANGE UP (ref 8.6–10.2)
CHLORIDE SERPL-SCNC: 104 MMOL/L — SIGNIFICANT CHANGE UP (ref 98–107)
CO2 SERPL-SCNC: 19 MMOL/L — LOW (ref 22–29)
COLLECT DURATION TIME UR: 24 HR — SIGNIFICANT CHANGE UP
CREAT SERPL-MCNC: 0.49 MG/DL — LOW (ref 0.5–1.3)
GLUCOSE SERPL-MCNC: 82 MG/DL — SIGNIFICANT CHANGE UP (ref 70–99)
MAGNESIUM SERPL-MCNC: 1.5 MG/DL — LOW (ref 1.6–2.6)
MAGNESIUM UR-MCNC: 9.6 MG/DL — SIGNIFICANT CHANGE UP
PHOSPHATE SERPL-MCNC: 2.6 MG/DL — SIGNIFICANT CHANGE UP (ref 2.4–4.7)
POTASSIUM SERPL-MCNC: 3.9 MMOL/L — SIGNIFICANT CHANGE UP (ref 3.5–5.3)
POTASSIUM SERPL-SCNC: 3.9 MMOL/L — SIGNIFICANT CHANGE UP (ref 3.5–5.3)
SODIUM SERPL-SCNC: 135 MMOL/L — SIGNIFICANT CHANGE UP (ref 135–145)
TOTAL VOLUME - 24 HOUR: 3350 ML — SIGNIFICANT CHANGE UP
URINE CREATININE CALCULATION: 1.3 G/24 H — SIGNIFICANT CHANGE UP (ref 0.8–1.8)

## 2020-05-10 PROCEDURE — 85379 FIBRIN DEGRADATION QUANT: CPT

## 2020-05-10 PROCEDURE — 84540 ASSAY OF URINE/UREA-N: CPT

## 2020-05-10 PROCEDURE — 83550 IRON BINDING TEST: CPT

## 2020-05-10 PROCEDURE — 84443 ASSAY THYROID STIM HORMONE: CPT

## 2020-05-10 PROCEDURE — 99239 HOSP IP/OBS DSCHRG MGMT >30: CPT

## 2020-05-10 PROCEDURE — 84105 ASSAY OF URINE PHOSPHORUS: CPT

## 2020-05-10 PROCEDURE — 84155 ASSAY OF PROTEIN SERUM: CPT

## 2020-05-10 PROCEDURE — 83615 LACTATE (LD) (LDH) ENZYME: CPT

## 2020-05-10 PROCEDURE — 84133 ASSAY OF URINE POTASSIUM: CPT

## 2020-05-10 PROCEDURE — 84300 ASSAY OF URINE SODIUM: CPT

## 2020-05-10 PROCEDURE — 82436 ASSAY OF URINE CHLORIDE: CPT

## 2020-05-10 PROCEDURE — 84156 ASSAY OF PROTEIN URINE: CPT

## 2020-05-10 PROCEDURE — 82746 ASSAY OF FOLIC ACID SERUM: CPT

## 2020-05-10 PROCEDURE — 93970 EXTREMITY STUDY: CPT

## 2020-05-10 PROCEDURE — 82164 ANGIOTENSIN I ENZYME TEST: CPT

## 2020-05-10 PROCEDURE — 83540 ASSAY OF IRON: CPT

## 2020-05-10 PROCEDURE — 93005 ELECTROCARDIOGRAM TRACING: CPT

## 2020-05-10 PROCEDURE — 82306 VITAMIN D 25 HYDROXY: CPT

## 2020-05-10 PROCEDURE — 88305 TISSUE EXAM BY PATHOLOGIST: CPT

## 2020-05-10 PROCEDURE — 81001 URINALYSIS AUTO W/SCOPE: CPT

## 2020-05-10 PROCEDURE — C8929: CPT

## 2020-05-10 PROCEDURE — 83735 ASSAY OF MAGNESIUM: CPT

## 2020-05-10 PROCEDURE — 83521 IG LIGHT CHAINS FREE EACH: CPT

## 2020-05-10 PROCEDURE — 84165 PROTEIN E-PHORESIS SERUM: CPT

## 2020-05-10 PROCEDURE — 80053 COMPREHEN METABOLIC PANEL: CPT

## 2020-05-10 PROCEDURE — 82607 VITAMIN B-12: CPT

## 2020-05-10 PROCEDURE — 80076 HEPATIC FUNCTION PANEL: CPT

## 2020-05-10 PROCEDURE — 82340 ASSAY OF CALCIUM IN URINE: CPT

## 2020-05-10 PROCEDURE — 80048 BASIC METABOLIC PNL TOTAL CA: CPT

## 2020-05-10 PROCEDURE — 83970 ASSAY OF PARATHORMONE: CPT

## 2020-05-10 PROCEDURE — 70540 MRI ORBIT/FACE/NECK W/O DYE: CPT

## 2020-05-10 PROCEDURE — 82310 ASSAY OF CALCIUM: CPT

## 2020-05-10 PROCEDURE — 87635 SARS-COV-2 COVID-19 AMP PRB: CPT

## 2020-05-10 PROCEDURE — 84466 ASSAY OF TRANSFERRIN: CPT

## 2020-05-10 PROCEDURE — 82088 ASSAY OF ALDOSTERONE: CPT

## 2020-05-10 PROCEDURE — 83519 RIA NONANTIBODY: CPT

## 2020-05-10 PROCEDURE — 84702 CHORIONIC GONADOTROPIN TEST: CPT

## 2020-05-10 PROCEDURE — 82652 VIT D 1 25-DIHYDROXY: CPT

## 2020-05-10 PROCEDURE — 71045 X-RAY EXAM CHEST 1 VIEW: CPT

## 2020-05-10 PROCEDURE — 88331 PATH CONSLTJ SURG 1 BLK 1SPC: CPT

## 2020-05-10 PROCEDURE — 99233 SBSQ HOSP IP/OBS HIGH 50: CPT

## 2020-05-10 PROCEDURE — 36415 COLL VENOUS BLD VENIPUNCTURE: CPT

## 2020-05-10 PROCEDURE — 84244 ASSAY OF RENIN: CPT

## 2020-05-10 PROCEDURE — 76801 OB US < 14 WKS SINGLE FETUS: CPT

## 2020-05-10 PROCEDURE — 76536 US EXAM OF HEAD AND NECK: CPT

## 2020-05-10 PROCEDURE — 85027 COMPLETE CBC AUTOMATED: CPT

## 2020-05-10 PROCEDURE — T1013: CPT

## 2020-05-10 PROCEDURE — 99285 EMERGENCY DEPT VISIT HI MDM: CPT

## 2020-05-10 PROCEDURE — 84100 ASSAY OF PHOSPHORUS: CPT

## 2020-05-10 PROCEDURE — 82728 ASSAY OF FERRITIN: CPT

## 2020-05-10 RX ORDER — POLYETHYLENE GLYCOL 3350 17 G/17G
17 POWDER, FOR SOLUTION ORAL
Qty: 30 | Refills: 0
Start: 2020-05-10 | End: 2020-06-08

## 2020-05-10 RX ORDER — FERROUS SULFATE 325(65) MG
1 TABLET ORAL
Qty: 30 | Refills: 0
Start: 2020-05-10 | End: 2020-06-08

## 2020-05-10 RX ORDER — MAGNESIUM SULFATE 500 MG/ML
1 VIAL (ML) INJECTION ONCE
Refills: 0 | Status: COMPLETED | OUTPATIENT
Start: 2020-05-10 | End: 2020-05-10

## 2020-05-10 RX ORDER — MAGNESIUM SULFATE 500 MG/ML
2 VIAL (ML) INJECTION ONCE
Refills: 0 | Status: COMPLETED | OUTPATIENT
Start: 2020-05-10 | End: 2020-05-10

## 2020-05-10 RX ORDER — CHOLECALCIFEROL (VITAMIN D3) 125 MCG
2000 CAPSULE ORAL DAILY
Refills: 0 | Status: DISCONTINUED | OUTPATIENT
Start: 2020-05-10 | End: 2020-05-10

## 2020-05-10 RX ORDER — SODIUM,POTASSIUM PHOSPHATES 278-250MG
1 POWDER IN PACKET (EA) ORAL
Refills: 0 | Status: DISCONTINUED | OUTPATIENT
Start: 2020-05-10 | End: 2020-05-10

## 2020-05-10 RX ORDER — POTASSIUM CHLORIDE 20 MEQ
40 PACKET (EA) ORAL
Refills: 0 | Status: DISCONTINUED | OUTPATIENT
Start: 2020-05-10 | End: 2020-05-10

## 2020-05-10 RX ADMIN — POLYETHYLENE GLYCOL 3350 17 GRAM(S): 17 POWDER, FOR SOLUTION ORAL at 11:16

## 2020-05-10 RX ADMIN — HEPARIN SODIUM 5000 UNIT(S): 5000 INJECTION INTRAVENOUS; SUBCUTANEOUS at 05:23

## 2020-05-10 RX ADMIN — HEPARIN SODIUM 5000 UNIT(S): 5000 INJECTION INTRAVENOUS; SUBCUTANEOUS at 13:09

## 2020-05-10 RX ADMIN — Medication 325 MILLIGRAM(S): at 11:16

## 2020-05-10 RX ADMIN — Medication 1000 MILLIGRAM(S): at 11:19

## 2020-05-10 RX ADMIN — Medication 1 TABLET(S): at 13:08

## 2020-05-10 RX ADMIN — Medication 2000 UNIT(S): at 13:09

## 2020-05-10 RX ADMIN — Medication 50 GRAM(S): at 11:54

## 2020-05-10 RX ADMIN — Medication 1 TABLET(S): at 11:15

## 2020-05-10 RX ADMIN — Medication 1 PACKET(S): at 11:54

## 2020-05-10 RX ADMIN — Medication 100 GRAM(S): at 13:07

## 2020-05-10 RX ADMIN — Medication 1 PACKET(S): at 05:23

## 2020-05-10 RX ADMIN — Medication 1 TABLET(S): at 11:16

## 2020-05-10 RX ADMIN — Medication 20 MILLIEQUIVALENT(S): at 05:24

## 2020-05-10 NOTE — PROGRESS NOTE ADULT - ASSESSMENT
1. Primary hyperparathyroidism s/p emergent parathyroidectomy; Ca++ levels wnl, iPTH appropriately suppressed- continue calcium supplements.   1-25 (OH)2 vitamin D elevated- ? driven by 1-alpha hydroxylase activity by hypophosphatemia. Major cause of increased 1-25 (OH)2 vitamin D in her age group is Sarcoidosis; pt however has no clinical manifestation. Also with clear CXR, ACE wnl.  LDH wnl; Cant get PAN- CT due to pregnancy.   Replete phos levels.     2. 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 Syncope- likely due to electrolyte abnormalities  Cardiology follow up 1. Primary hyperparathyroidism s/p emergent parathyroidectomy; Ca++ levels wnl, iPTH appropriately suppressed- continue calcium supplements.   1-25 (OH)2 vitamin D elevated- ? driven by 1-alpha hydroxylase activity by hypophosphatemia. Major cause of increased 1-25 (OH)2 vitamin D in her age group is Sarcoidosis; pt however has no clinical manifestation. Also with clear CXR, ACE wnl.  LDH wnl; Cant get PAN- CT due to pregnancy.   Continue Kphos  Start Vitamin D 2000 IU daily     2. 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 Medications used for the treatment of cardiac arrest in pregnancy are the same as in the non-pregnant woman. Doses and indications should follow current Advanced Cardiovascular Life Support guidelines. Appropriate medications should not be withheld due to concerns of fetal teratogenicity (Denny 2009; Jeejeebhoy [AHA] 2015). Antacids containing sodium bicarbonate should not be used during pregnancy due to their potential to cause metabolic alkalosis and fluid overload (Mena 2007)      3. Syncope- likely due to electrolyte abnormalities  Cardiology follow up      Nephrology follow up out pt in 1 week  MFM follow up outpt

## 2020-05-10 NOTE — PROGRESS NOTE ADULT - REASON FOR ADMISSION
hypokalemia, syncope

## 2020-05-10 NOTE — PROGRESS NOTE ADULT - ATTENDING COMMENTS
Above care of plan DW Pt and she agreed with above plan  TARAS RN
Above care of plan DW Pt and she agreed with above plan  TARAS RN
MRI neck showing mass 1X0.5X 1.5 cm left upper pole of thyroid gland concerning for parathyroid adenoma.    Discussed with Dr Fitch endocrinology.  Recommend immediate adenectomy surgery as no further medical modality for management in 2nd trimester pregnancy.    Discussed with Dr To endocrine surgery.  Patient is getting on the list for adenectomy tomorrow 5/7 .  Will be NPO after midnight today.   Discussed with OB resident/OB team, who will clear patient OB-wise for surgery.  Ob team will like to be called one hour prior to surgery to do FH test and also immediately after patient is wheeled out OB will like to be called to repeat FH test/Monitoring.     Patient is medically optimized for surgery.
Primary hyperparathyroidism with hypercalcemia and multiple electrolyte abnormality  Status post left parathyroids resection  Improving calcium levels  Following electrolytes, mag and phos
Primary hyperparathyroidism   Left upper pole parathyroid adenoma  Hypercalcemia. Multiple electrolyte abnormalities  Cyesis 15 week  Poor tolerance and no improvement with mycalamine   Planned for adenoma resection 5/7/

## 2020-05-10 NOTE — PROGRESS NOTE ADULT - SUBJECTIVE AND OBJECTIVE BOX
[facility] DIVISION OF KIDNEY DISEASES AND HYPERTENSION -- FOLLOW UP NOTE  --------------------------------------------------------------------------------  Chief Complaint:  Hypercalcemia, hypokalemia, syncope    24 hour events/subjective:  S/P Parathyroidectomy - POD #3  No acute events      PAST HISTORY  --------------------------------------------------------------------------------  No significant changes to PMH, PSH, FHx, SHx, unless otherwise noted    ALLERGIES & MEDICATIONS  --------------------------------------------------------------------------------  Allergies    No Known Allergies    Intolerances      Standing Inpatient Medications  ascorbic acid 1000 milliGRAM(s) Oral daily  calcium carbonate    500 mG (Tums) Chewable 1 Tablet(s) Chew three times a day  ferrous    sulfate 325 milliGRAM(s) Oral daily  heparin   Injectable 5000 Unit(s) SubCutaneous every 8 hours  polyethylene glycol 3350 17 Gram(s) Oral daily  potassium chloride    Tablet ER 20 milliEquivalent(s) Oral two times a day  potassium phosphate / sodium phosphate powder 1 Packet(s) Oral two times a day  prenatal multivitamin 1 Tablet(s) Oral daily    PRN Inpatient Medications  acetaminophen   Tablet .. 975 milliGRAM(s) Oral every 6 hours PRN  diphenhydrAMINE   Injectable 25 milliGRAM(s) IV Push every 6 hours PRN  ondansetron Injectable 4 milliGRAM(s) IV Push every 6 hours PRN  traMADol 25 milliGRAM(s) Oral every 6 hours PRN      REVIEW OF SYSTEMS  --------------------------------------------------------------------------------  Gen:  + weight changes, fatigue, fevers/chills, weakness  Skin: No rashes  Head/Eyes/Ears/Mouth: No headache; Normal hearing; Normal vision w/o blurriness; No sinus pain/discomfort, sore throat  Respiratory: No dyspnea, cough, wheezing, hemoptysis  CV: No chest pain, PND, orthopnea  GI: No abdominal pain, diarrhea, constipation, nausea, vomiting, melena, hematochezia  : No increased frequency, dysuria, hematuria, nocturia  MSK: No joint pain/swelling; no back pain; no edema  Neuro: No dizziness/lightheadedness, weakness, seizures, numbness, tingling  Heme: No easy bruising or bleeding  Endo: No heat/cold intolerance  Psych: No significant nervousness, anxiety, stress, depression    All other systems were reviewed and are negative, except as noted.    VITALS/PHYSICAL EXAM  --------------------------------------------------------------------------------  T(C): 36.4 (05-10-20 @ 08:00), Max: 36.8 (05-09-20 @ 15:30)  HR: 88 (05-10-20 @ 08:00) (88 - 97)  BP: 99/63 (05-10-20 @ 08:00) (94/55 - 108/56)  RR: 17 (05-10-20 @ 08:00) (17 - 18)  SpO2: 96% (05-10-20 @ 08:00) (96% - 97%)  Wt(kg): --        Physical Exam:  	Gen: NAD, well-appearing, 14 weeks gestation  	HEENT: PERRL, supple neck, clear oropharynx  	Pulm: CTA B/L  	CV: RRR, S1S2; no rub  	Back: No spinal or CVA tenderness; no sacral edema  	Abd: +BS, soft, nontender/nondistended  	: No suprapubic tenderness  	UE: Warm, FROM, no clubbing, intact strength; no edema; no asterixis  	LE: Warm, FROM, no clubbing, intact strength; no edema  	Neuro: No focal deficits  	Psych: Normal affect and mood  	Skin: Warm, without rashes  	Vascular access:  N/A      LABS/STUDIES  --------------------------------------------------------------------------------    135  |  104  |  12.0  ----------------------------<  82      [05-10-20 @ 09:18]  3.9   |  19.0  |  0.49        Ca     9.2     [05-10-20 @ 09:18]      Mg     1.5     [05-10-20 @ 09:18]      Phos  2.6     [05-10-20 @ 09:18]    TPro  6.7  /  Alb  3.3  /  TBili  <0.2  /  DBili  x   /  AST  15  /  ALT  11  /  AlkPhos  105  [05-09-20 @ 07:40]          Creatinine Trend:  SCr 0.49 [05-10 @ 09:18]  SCr 0.60 [05-09 @ 07:40]  SCr 0.49 [05-08 @ 16:54]  SCr 0.53 [05-08 @ 08:30]  SCr 0.47 [05-07 @ 08:32]    Urinalysis - [04-28-20 @ 22:15]      Color Yellow / Appearance Clear / SG 1.010 / pH 7.0      Gluc Negative / Ketone Negative  / Bili Negative / Urobili Negative       Blood Negative / Protein Negative / Leuk Est Small / Nitrite Negative      RBC Negative / WBC 0-2 / Hyaline  / Gran  / Sq Epi  / Non Sq Epi Occasional / Bacteria     Urine Sodium 74      [05-09-20 @ 22:33]  Urine Potassium 43      [05-09-20 @ 22:33]  Urine Chloride 89      [05-09-20 @ 22:33]  Urine Phosphorus <5.0      [05-09-20 @ 04:55]    Iron 26, TIBC 420, %sat 6      [04-28-20 @ 18:11]  Ferritin 87      [04-28-20 @ 18:11]  PTH -- (Ca 11.6)      [05-08-20 @ 22:06]   <1  PTH -- (Ca 12.9)      [04-29-20 @ 01:20]   76  Vitamin D (25OH) 17.0      [05-07-20 @ 16:19]  TSH 0.65      [04-29-20 @ 06:41]      Free Light Chains: kappa 1.41, lambda 1.92, ratio = 0.73      [04-30 @ 21:36]  SPEP Interpretation: Normal Electrophoresis Pattern      [04-30-20 @ 21:36] [facility] DIVISION OF KIDNEY DISEASES AND HYPERTENSION -- FOLLOW UP NOTE  --------------------------------------------------------------------------------  Chief Complaint:  Hypercalcemia, hypokalemia, syncope    24 hour events/subjective:  S/P Parathyroidectomy - POD #3  No acute events  Feels well      PAST HISTORY  --------------------------------------------------------------------------------  No significant changes to PMH, PSH, FHx, SHx, unless otherwise noted    ALLERGIES & MEDICATIONS  --------------------------------------------------------------------------------  Allergies    No Known Allergies    Intolerances      Standing Inpatient Medications  ascorbic acid 1000 milliGRAM(s) Oral daily  calcium carbonate    500 mG (Tums) Chewable 1 Tablet(s) Chew three times a day  ferrous    sulfate 325 milliGRAM(s) Oral daily  heparin   Injectable 5000 Unit(s) SubCutaneous every 8 hours  polyethylene glycol 3350 17 Gram(s) Oral daily  potassium chloride    Tablet ER 20 milliEquivalent(s) Oral two times a day  potassium phosphate / sodium phosphate powder 1 Packet(s) Oral two times a day  prenatal multivitamin 1 Tablet(s) Oral daily    PRN Inpatient Medications  acetaminophen   Tablet .. 975 milliGRAM(s) Oral every 6 hours PRN  diphenhydrAMINE   Injectable 25 milliGRAM(s) IV Push every 6 hours PRN  ondansetron Injectable 4 milliGRAM(s) IV Push every 6 hours PRN  traMADol 25 milliGRAM(s) Oral every 6 hours PRN      REVIEW OF SYSTEMS  --------------------------------------------------------------------------------  Gen:  + weight changes, fatigue, fevers/chills, weakness  Skin: No rashes  Head/Eyes/Ears/Mouth: No headache; Normal hearing; Normal vision w/o blurriness; No sinus pain/discomfort, sore throat  Respiratory: No dyspnea, cough, wheezing, hemoptysis  CV: No chest pain, PND, orthopnea  GI: No abdominal pain, diarrhea, constipation, nausea, vomiting, melena, hematochezia  : No increased frequency, dysuria, hematuria, nocturia  MSK: No joint pain/swelling; no back pain; no edema  Neuro: No dizziness/lightheadedness, weakness, seizures, numbness, tingling  Heme: No easy bruising or bleeding  Endo: No heat/cold intolerance  Psych: No significant nervousness, anxiety, stress, depression    All other systems were reviewed and are negative, except as noted.    VITALS/PHYSICAL EXAM  --------------------------------------------------------------------------------  T(C): 36.4 (05-10-20 @ 08:00), Max: 36.8 (05-09-20 @ 15:30)  HR: 88 (05-10-20 @ 08:00) (88 - 97)  BP: 99/63 (05-10-20 @ 08:00) (94/55 - 108/56)  RR: 17 (05-10-20 @ 08:00) (17 - 18)  SpO2: 96% (05-10-20 @ 08:00) (96% - 97%)  Wt(kg): --        Physical Exam:  	Gen: NAD, well-appearing, 14 weeks gestation  	HEENT: PERRL, supple neck, clear oropharynx  	Pulm: CTA B/L  	CV: RRR, S1S2; no rub  	Back: No spinal or CVA tenderness; no sacral edema  	Abd: +BS, soft, nontender/nondistended  	: No suprapubic tenderness  	UE: Warm, FROM, no clubbing, intact strength; no edema; no asterixis  	LE: Warm, FROM, no clubbing, intact strength; no edema  	Neuro: No focal deficits  	Psych: Normal affect and mood  	Skin: Warm, without rashes  	Vascular access:  N/A      LABS/STUDIES  --------------------------------------------------------------------------------    135  |  104  |  12.0  ----------------------------<  82      [05-10-20 @ 09:18]  3.9   |  19.0  |  0.49        Ca     9.2     [05-10-20 @ 09:18]      Mg     1.5     [05-10-20 @ 09:18]      Phos  2.6     [05-10-20 @ 09:18]    TPro  6.7  /  Alb  3.3  /  TBili  <0.2  /  DBili  x   /  AST  15  /  ALT  11  /  AlkPhos  105  [05-09-20 @ 07:40]          Creatinine Trend:  SCr 0.49 [05-10 @ 09:18]  SCr 0.60 [05-09 @ 07:40]  SCr 0.49 [05-08 @ 16:54]  SCr 0.53 [05-08 @ 08:30]  SCr 0.47 [05-07 @ 08:32]    Urinalysis - [04-28-20 @ 22:15]      Color Yellow / Appearance Clear / SG 1.010 / pH 7.0      Gluc Negative / Ketone Negative  / Bili Negative / Urobili Negative       Blood Negative / Protein Negative / Leuk Est Small / Nitrite Negative      RBC Negative / WBC 0-2 / Hyaline  / Gran  / Sq Epi  / Non Sq Epi Occasional / Bacteria     Urine Sodium 74      [05-09-20 @ 22:33]  Urine Potassium 43      [05-09-20 @ 22:33]  Urine Chloride 89      [05-09-20 @ 22:33]  Urine Phosphorus <5.0      [05-09-20 @ 04:55]    Iron 26, TIBC 420, %sat 6      [04-28-20 @ 18:11]  Ferritin 87      [04-28-20 @ 18:11]  PTH -- (Ca 11.6)      [05-08-20 @ 22:06]   <1  PTH -- (Ca 12.9)      [04-29-20 @ 01:20]   76  Vitamin D (25OH) 17.0      [05-07-20 @ 16:19]  TSH 0.65      [04-29-20 @ 06:41]      Free Light Chains: kappa 1.41, lambda 1.92, ratio = 0.73      [04-30 @ 21:36]  SPEP Interpretation: Normal Electrophoresis Pattern      [04-30-20 @ 21:36]

## 2020-05-10 NOTE — PROGRESS NOTE ADULT - ASSESSMENT
35 yo F 14 weeks pregnant ( per her GYN attending ) admitted with synocpal episode secondary to severe electrolyte abnormalities. Found with primary hypperparathyroidisim secondary to parathyroid adenoma. Seen in consult by OB, nephro and endo. For surgical resection today.    1. Syncope   - Likely due to electrolyte abnormality of primary hyperparathyroidism   - TTE- EF 55-60%, unremarkable   - Seen by cardio, eventual set up for 21 day MCOT monitor as an outpatient on DC  -Replaced lytes    2. Primary hyperparathyroidism secondary to parathyroid adenoma  s/p lt parathyroidectomy 5/7Primary hyperparathyroidism  Complicated by Hypokalemia / hypomagnesemia / hypophosphatemia/ Hypercalcemia  -replaced K,MG,PHOS.Ca nl  - MRI neck reviewed, parathyroid adenoma   - S/p 4 doses of calcitonin, refractory nausea/vomiting. Pt had refused further doses of calcitonin due to headache/n/v  - Seen in consult by endo  - Monitor serum Ca levels and K,Mg,Phos levels, continue tums   - No signs of hypocalcemia currently   - Am labs pending     4. Anemia of iron deficiency and vit b12 low   - Iron supplement   - Received vit B12 injection     5. Recent COVID infection   - Positive 3/26 per her GYN attending outpatient records   - Repeat neg on 4/22 and 4/29     6. Cyesis  - 14 weeks pregnancy   - Dr Afshin RAYMOND said - he does not recommend any gyn testing Sono at present   - Seen in consult by [facility]    DVT PPE Heparin       DISPO: Will monitor electrolytes for 24 more hours. If remains stable, plan  DC home tomorrow 5/10 33 yo F 14 weeks pregnant ( per her GYN attending ) admitted with synocpal episode secondary to severe electrolyte abnormalities. Found with primary hypperparathyroidisim secondary to parathyroid adenoma. Seen in consult by OB, nephro and endo. For surgical resection today.    1. Syncope   - Likely due to electrolyte abnormality of primary hyperparathyroidism   - TTE- EF 55-60%, unremarkable   - Seen by cardio, eventual set up for 21 day MCOT monitor as an outpatient on DC  -Replaced lytes    2. Primary hyperparathyroidism secondary to parathyroid adenoma  s/p lt parathyroidectomy 5/7Primary hyperparathyroidism  Complicated by Hypokalemia / hypomagnesemia / hypophosphatemia/ Hypercalcemia  -replaced K,MG,PHOS.Ca nl. WAITING FOR AM LAB  - MRI neck reviewed, parathyroid adenoma   - S/p 4 doses of calcitonin, refractory nausea/vomiting. Pt had refused further doses of calcitonin due to headache/n/v  - Seen in consult by endo  - Monitor serum Ca levels and K,Mg,Phos levels, continue tums   - No signs of hypocalcemia currently   - Am labs pending     4. Anemia of iron deficiency and vit b12 low   - Iron supplement   - Received vit B12 injection     5. Recent COVID infection   - Positive 3/26 per her GYN attending outpatient records   - Repeat neg on 4/22 and 4/29     6. Cyesis  - 14 weeks pregnancy   - Dr Afshin RAYMOND said - he does not recommend any gyn testing Sono at present   - Seen in consult by kevin RAYMOND    DVT PPE Heparin       DISPO: Will monitor electrolytes ,AWAITING FOR AM LABS If remains stable, plan  DC home

## 2020-05-10 NOTE — DISCHARGE NOTE NURSING/CASE MANAGEMENT/SOCIAL WORK - PATIENT PORTAL LINK FT
You can access the FollowMyHealth Patient Portal offered by [facility] by registering at the following website: [email or web address]. By joining [employer]’s FollowMyHealth portal, you will also be able to view your health information using other applications (apps) compatible with our system.

## 2020-05-10 NOTE — PROGRESS NOTE ADULT - SUBJECTIVE AND OBJECTIVE BOX
[facility] Division of Hospital Medicine   Ahmet Waters  Phone Number - [phone]    Patient is a 34y old  Female who presents with a chief complaint of hypokalemia, syncope (09 May 2020 13:22)      SUBJECTIVE / OVERNIGHT EVENTS:  ADDITIONAL REVIEW OF SYSTEMS:    MEDICATIONS  (STANDING):  ascorbic acid 1000 milliGRAM(s) Oral daily  calcium carbonate    500 mG (Tums) Chewable 1 Tablet(s) Chew three times a day  ferrous    sulfate 325 milliGRAM(s) Oral daily  heparin   Injectable 5000 Unit(s) SubCutaneous every 8 hours  polyethylene glycol 3350 17 Gram(s) Oral daily  potassium chloride    Tablet ER 20 milliEquivalent(s) Oral two times a day  potassium phosphate / sodium phosphate powder 1 Packet(s) Oral two times a day  prenatal multivitamin 1 Tablet(s) Oral daily    MEDICATIONS  (PRN):  acetaminophen   Tablet .. 975 milliGRAM(s) Oral every 6 hours PRN Severe Pain (7 - 10)  diphenhydrAMINE   Injectable 25 milliGRAM(s) IV Push every 6 hours PRN Insomnia/rash and itchiness  ondansetron Injectable 4 milliGRAM(s) IV Push every 6 hours PRN Nausea and/or Vomiting  traMADol 25 milliGRAM(s) Oral every 6 hours PRN Moderate Pain (4 - 6)      CAPILLARY BLOOD GLUCOSE        I&O's Summary      PHYSICAL EXAM:  Vital Signs Last 24 Hrs  T(C): 36.8 (10 May 2020 05:19), Max: 36.8 (09 May 2020 08:24)  T(F): 98.3 (10 May 2020 05:19), Max: 98.3 (10 May 2020 05:19)  HR: 92 (10 May 2020 05:19) (92 - 97)  BP: 104/63 (10 May 2020 05:19) (94/55 - 108/56)  BP(mean): --  RR: 18 (10 May 2020 05:19) (18 - 18)  SpO2: 96% (10 May 2020 05:19) (96% - 97%)  CONSTITUTIONAL: NAD, well-developed, well-groomed  EYES: PERRLA; conjunctiva and sclera clear  ENMT: Moist oral mucosa, no pharyngeal injection or exudates; normal dentition  NECK: Supple, no palpable masses; no thyromegaly  RESPIRATORY: Normal respiratory effort; lungs are clear to auscultation bilaterally  CARDIOVASCULAR: Regular rate and rhythm, normal S1 and S2, no murmur/rub/gallop; No lower extremity edema; Peripheral pulses are 2+ bilaterally  ABDOMEN: Nontender to palpation, normoactive bowel sounds, no rebound/guarding; No hepatosplenomegaly  MUSCLOSKELETAL:  Normal gait; no clubbing or cyanosis of digits; no joint swelling or tenderness to palpation  PSYCH: A+O to person, place, and time; affect appropriate  NEUROLOGY: CN 2-12 are intact and symmetric; no gross sensory deficits;   SKIN: No rashes; no palpable lesions    LABS:                        9.2    10.10 )-----------( 266      ( 08 May 2020 08:30 )             28.2     05-09    133<L>  |  101  |  11.0  ----------------------------<  122<H>  3.3<L>   |  17.0<L>  |  0.60    Ca    10.1      09 May 2020 07:40  Phos  1.5     05-09  Mg     1.2     05-09    TPro  6.7  /  Alb  3.3  /  TBili  <0.2<L>  /  DBili  x   /  AST  15  /  ALT  11  /  AlkPhos  105  05-09                RADIOLOGY & ADDITIONAL TESTS:  Results Reviewed:   Imaging Personally Reviewed:  Electrocardiogram Personally Reviewed:    COORDINATION OF CARE:  Care Discussed with Consultants/Other Providers [Y/N]:  Prior or Outpatient Records Reviewed [Y/N]: [facility] Division of Hospital Medicine   Ahmet Waters  Phone Number - [phone]    Patient is a 34y old  Female who presents with a chief complaint of hypokalemia, syncope (09 May 2020 13:22)    Pt seen and exam. No neck pain,sore throat,sob,chest pain,muscle cramp.  Feeling better.Tolerating clear.Pt is asking for regular diet.      SUBJECTIVE / OVERNIGHT EVENTS: None  REVIEW OF SYSTEMS: All systems are reviewed and found to be negative    MEDICATIONS  (STANDING):  ascorbic acid 1000 milliGRAM(s) Oral daily  calcium carbonate    500 mG (Tums) Chewable 1 Tablet(s) Chew three times a day  ferrous    sulfate 325 milliGRAM(s) Oral daily  heparin   Injectable 5000 Unit(s) SubCutaneous every 8 hours  polyethylene glycol 3350 17 Gram(s) Oral daily  potassium chloride    Tablet ER 20 milliEquivalent(s) Oral two times a day  potassium phosphate / sodium phosphate powder 1 Packet(s) Oral two times a day  prenatal multivitamin 1 Tablet(s) Oral daily    MEDICATIONS  (PRN):  acetaminophen   Tablet .. 975 milliGRAM(s) Oral every 6 hours PRN Severe Pain (7 - 10)  diphenhydrAMINE   Injectable 25 milliGRAM(s) IV Push every 6 hours PRN Insomnia/rash and itchiness  ondansetron Injectable 4 milliGRAM(s) IV Push every 6 hours PRN Nausea and/or Vomiting  traMADol 25 milliGRAM(s) Oral every 6 hours PRN Moderate Pain (4 - 6)      CAPILLARY BLOOD GLUCOSE        I&O's Summary      PHYSICAL EXAM:  Vital Signs Last 24 Hrs  T(C): 36.8 (10 May 2020 05:19), Max: 36.8 (09 May 2020 08:24)  T(F): 98.3 (10 May 2020 05:19), Max: 98.3 (10 May 2020 05:19)  HR: 92 (10 May 2020 05:19) (92 - 97)  BP: 104/63 (10 May 2020 05:19) (94/55 - 108/56)  BP(mean): --  RR: 18 (10 May 2020 05:19) (18 - 18)  SpO2: 96% (10 May 2020 05:19) (96% - 97%)  CONSTITUTIONAL: NAD, well-developed, well-groomed  EYES: PERRLA; conjunctiva and sclera clear  ENMT: Moist oral mucosa, no pharyngeal injection or exudates; normal dentition  NECK: Supple, incision front of neck dry,no discharge seen,no swelling.mild tender  RESPIRATORY: Normal respiratory effort; lungs are clear to auscultation bilaterally  CARDIOVASCULAR: Regular rate and rhythm, normal S1 and S2, no murmur/rub/gallop;  EXTS:  No lower extremity edema; Peripheral pulses are 2+ bilaterally  ABDOMEN: Nontender to palpation, normoactive bowel sounds, no rebound/guarding;   MUSCLOSKELETAL:  Normal gait; no clubbing or cyanosis of digits; no joint swelling or tenderness to palpation  PSYCH:; affect appropriate  NEUROLOGY:  no gross sensory deficits;  A+O to person, place, and time  SKIN: No rashes; no palpable lesions    LABS:                        9.2    10.10 )-----------( 266      ( 08 May 2020 08:30 )             28.2     05-09    133<L>  |  101  |  11.0  ----------------------------<  122<H>  3.3<L>   |  17.0<L>  |  0.60    Ca    10.1      09 May 2020 07:40  Phos  1.5     05-09  Mg     1.2     05-09    TPro  6.7  /  Alb  3.3  /  TBili  <0.2<L>  /  DBili  x   /  AST  15  /  ALT  11  /  AlkPhos  105  05-09                RADIOLOGY & ADDITIONAL TESTS:  Results Reviewed: BY ME  Imaging Personally Reviewed:  Electrocardiogram Personally Reviewed:    COORDINATION OF CARE:  Care Discussed with Consultants/Other Providers [Y/N]:  Prior or Outpatient Records Reviewed [Y/N]:

## 2020-05-10 NOTE — DISCHARGE NOTE NURSING/CASE MANAGEMENT/SOCIAL WORK - NSDCFUADDAPPT_GEN_ALL_CORE_FT
F/U OB/GYN Dr. Peep one week  F/U surgery one week  F/U endocrinology one week  Nephrology and cardiology 2 weeks

## 2020-05-11 LAB — SURGICAL PATHOLOGY STUDY: SIGNIFICANT CHANGE UP

## 2020-05-12 LAB — RENIN PLAS-CCNC: 7.48 NG/ML/HR — HIGH (ref 0.17–5.38)

## 2020-05-14 LAB — ALDOST 24H UR-MCNC: SIGNIFICANT CHANGE UP

## 2020-05-20 ENCOUNTER — EMERGENCY (EMERGENCY)
Facility: HOSPITAL | Age: 35
LOS: 1 days | End: 2020-05-20
Attending: STUDENT IN AN ORGANIZED HEALTH CARE EDUCATION/TRAINING PROGRAM
Payer: COMMERCIAL

## 2020-05-20 ENCOUNTER — APPOINTMENT (OUTPATIENT)
Dept: NEPHROLOGY | Facility: CLINIC | Age: 35
End: 2020-05-20
Payer: MEDICAID

## 2020-05-20 VITALS
HEART RATE: 100 BPM | TEMPERATURE: 98 F | HEIGHT: 65 IN | OXYGEN SATURATION: 100 % | DIASTOLIC BLOOD PRESSURE: 76 MMHG | WEIGHT: 214.95 LBS | SYSTOLIC BLOOD PRESSURE: 120 MMHG | RESPIRATION RATE: 18 BRPM

## 2020-05-20 VITALS
HEART RATE: 97 BPM | WEIGHT: 215 LBS | TEMPERATURE: 98.4 F | SYSTOLIC BLOOD PRESSURE: 104 MMHG | OXYGEN SATURATION: 99 % | HEIGHT: 65 IN | DIASTOLIC BLOOD PRESSURE: 62 MMHG | BODY MASS INDEX: 35.82 KG/M2

## 2020-05-20 VITALS
DIASTOLIC BLOOD PRESSURE: 53 MMHG | OXYGEN SATURATION: 98 % | SYSTOLIC BLOOD PRESSURE: 104 MMHG | TEMPERATURE: 98 F | RESPIRATION RATE: 18 BRPM | HEART RATE: 85 BPM

## 2020-05-20 DIAGNOSIS — E83.42 HYPOMAGNESEMIA: ICD-10-CM

## 2020-05-20 DIAGNOSIS — E87.2 ACIDOSIS: ICD-10-CM

## 2020-05-20 DIAGNOSIS — Z98.89 OTHER SPECIFIED POSTPROCEDURAL STATES: Chronic | ICD-10-CM

## 2020-05-20 DIAGNOSIS — E87.6 HYPOKALEMIA: ICD-10-CM

## 2020-05-20 LAB
ALBUMIN SERPL ELPH-MCNC: 3.6 G/DL — SIGNIFICANT CHANGE UP (ref 3.3–5.2)
ALP SERPL-CCNC: 124 U/L — HIGH (ref 40–120)
ALT FLD-CCNC: 11 U/L — SIGNIFICANT CHANGE UP
ANION GAP SERPL CALC-SCNC: 15 MMOL/L — SIGNIFICANT CHANGE UP (ref 5–17)
APPEARANCE UR: CLEAR — SIGNIFICANT CHANGE UP
APTT BLD: 36.1 SEC — SIGNIFICANT CHANGE UP (ref 27.5–36.3)
AST SERPL-CCNC: 15 U/L — SIGNIFICANT CHANGE UP
BACTERIA # UR AUTO: ABNORMAL
BASOPHILS # BLD AUTO: 0.03 K/UL — SIGNIFICANT CHANGE UP (ref 0–0.2)
BASOPHILS NFR BLD AUTO: 0.3 % — SIGNIFICANT CHANGE UP (ref 0–2)
BILIRUB SERPL-MCNC: <0.2 MG/DL — LOW (ref 0.4–2)
BILIRUB UR-MCNC: NEGATIVE — SIGNIFICANT CHANGE UP
BUN SERPL-MCNC: 5 MG/DL — LOW (ref 8–20)
CALCIUM SERPL-MCNC: 9 MG/DL — SIGNIFICANT CHANGE UP (ref 8.6–10.2)
CHLORIDE SERPL-SCNC: 102 MMOL/L — SIGNIFICANT CHANGE UP (ref 98–107)
CO2 SERPL-SCNC: 19 MMOL/L — LOW (ref 22–29)
COLOR SPEC: YELLOW — SIGNIFICANT CHANGE UP
CREAT SERPL-MCNC: 0.42 MG/DL — LOW (ref 0.5–1.3)
DIFF PNL FLD: NEGATIVE — SIGNIFICANT CHANGE UP
EOSINOPHIL # BLD AUTO: 0.1 K/UL — SIGNIFICANT CHANGE UP (ref 0–0.5)
EOSINOPHIL NFR BLD AUTO: 1 % — SIGNIFICANT CHANGE UP (ref 0–6)
EPI CELLS # UR: SIGNIFICANT CHANGE UP
GLUCOSE SERPL-MCNC: 111 MG/DL — HIGH (ref 70–99)
GLUCOSE UR QL: NEGATIVE MG/DL — SIGNIFICANT CHANGE UP
HCT VFR BLD CALC: 30.1 % — LOW (ref 34.5–45)
HGB BLD-MCNC: 10 G/DL — LOW (ref 11.5–15.5)
IMM GRANULOCYTES NFR BLD AUTO: 0.4 % — SIGNIFICANT CHANGE UP (ref 0–1.5)
INR BLD: 1.15 RATIO — SIGNIFICANT CHANGE UP (ref 0.88–1.16)
KETONES UR-MCNC: NEGATIVE — SIGNIFICANT CHANGE UP
LEUKOCYTE ESTERASE UR-ACNC: ABNORMAL
LYMPHOCYTES # BLD AUTO: 2.34 K/UL — SIGNIFICANT CHANGE UP (ref 1–3.3)
LYMPHOCYTES # BLD AUTO: 23.3 % — SIGNIFICANT CHANGE UP (ref 13–44)
MAGNESIUM SERPL-MCNC: 1.8 MG/DL — SIGNIFICANT CHANGE UP (ref 1.6–2.6)
MCHC RBC-ENTMCNC: 28.2 PG — SIGNIFICANT CHANGE UP (ref 27–34)
MCHC RBC-ENTMCNC: 33.2 GM/DL — SIGNIFICANT CHANGE UP (ref 32–36)
MCV RBC AUTO: 85 FL — SIGNIFICANT CHANGE UP (ref 80–100)
MONOCYTES # BLD AUTO: 0.61 K/UL — SIGNIFICANT CHANGE UP (ref 0–0.9)
MONOCYTES NFR BLD AUTO: 6.1 % — SIGNIFICANT CHANGE UP (ref 2–14)
NEUTROPHILS # BLD AUTO: 6.93 K/UL — SIGNIFICANT CHANGE UP (ref 1.8–7.4)
NEUTROPHILS NFR BLD AUTO: 68.9 % — SIGNIFICANT CHANGE UP (ref 43–77)
NITRITE UR-MCNC: NEGATIVE — SIGNIFICANT CHANGE UP
PH UR: 7 — SIGNIFICANT CHANGE UP (ref 5–8)
PLATELET # BLD AUTO: 304 K/UL — SIGNIFICANT CHANGE UP (ref 150–400)
POTASSIUM SERPL-MCNC: 3.5 MMOL/L — SIGNIFICANT CHANGE UP (ref 3.5–5.3)
POTASSIUM SERPL-SCNC: 3.5 MMOL/L — SIGNIFICANT CHANGE UP (ref 3.5–5.3)
PROT SERPL-MCNC: 6.9 G/DL — SIGNIFICANT CHANGE UP (ref 6.6–8.7)
PROT UR-MCNC: NEGATIVE MG/DL — SIGNIFICANT CHANGE UP
PROTHROM AB SERPL-ACNC: 13 SEC — HIGH (ref 10–12.9)
RBC # BLD: 3.54 M/UL — LOW (ref 3.8–5.2)
RBC # FLD: 13.9 % — SIGNIFICANT CHANGE UP (ref 10.3–14.5)
RBC CASTS # UR COMP ASSIST: SIGNIFICANT CHANGE UP /HPF (ref 0–4)
SODIUM SERPL-SCNC: 136 MMOL/L — SIGNIFICANT CHANGE UP (ref 135–145)
SP GR SPEC: 1.01 — SIGNIFICANT CHANGE UP (ref 1.01–1.02)
UROBILINOGEN FLD QL: NEGATIVE MG/DL — SIGNIFICANT CHANGE UP
WBC # BLD: 10.05 K/UL — SIGNIFICANT CHANGE UP (ref 3.8–10.5)
WBC # FLD AUTO: 10.05 K/UL — SIGNIFICANT CHANGE UP (ref 3.8–10.5)
WBC UR QL: SIGNIFICANT CHANGE UP

## 2020-05-20 PROCEDURE — 85610 PROTHROMBIN TIME: CPT

## 2020-05-20 PROCEDURE — 80053 COMPREHEN METABOLIC PANEL: CPT

## 2020-05-20 PROCEDURE — 85027 COMPLETE CBC AUTOMATED: CPT

## 2020-05-20 PROCEDURE — 99283 EMERGENCY DEPT VISIT LOW MDM: CPT

## 2020-05-20 PROCEDURE — T1013: CPT

## 2020-05-20 PROCEDURE — 83735 ASSAY OF MAGNESIUM: CPT

## 2020-05-20 PROCEDURE — 81001 URINALYSIS AUTO W/SCOPE: CPT

## 2020-05-20 PROCEDURE — 99284 EMERGENCY DEPT VISIT MOD MDM: CPT

## 2020-05-20 PROCEDURE — 85730 THROMBOPLASTIN TIME PARTIAL: CPT

## 2020-05-20 PROCEDURE — 36415 COLL VENOUS BLD VENIPUNCTURE: CPT

## 2020-05-20 PROCEDURE — 99205 OFFICE O/P NEW HI 60 MIN: CPT

## 2020-05-20 NOTE — REVIEW OF SYSTEMS
[Eyesight Problems] : eyesight problems [Feeling Tired] : feeling tired [Constipation] : constipation [Dizziness] : dizziness [Feelings Of Weakness] : feelings of weakness [Negative] : ENT [Chills] : no chills [Fever] : no fever [Palpitations] : no palpitations [Chest Pain] : no chest pain [Shortness Of Breath] : no shortness of breath [Lower Ext Edema] : no lower extremity edema [SOB on Exertion] : no shortness of breath during exertion [Cough] : no cough [Abdominal Pain] : no abdominal pain [Vomiting] : no vomiting [Dysuria] : no dysuria [Fainting] : no fainting [Anxiety] : no anxiety [Easy Bleeding] : no tendency for easy bleeding [Depression] : no depression

## 2020-05-20 NOTE — ED PROVIDER NOTE - PHYSICAL EXAMINATION
Constitutional - well-developed; well nourished. Head - NCAT. Airway patent. Eyes - PERRL. CV - RRR. no murmur. no edema. Pulm - CTAB. Abd - soft, nt. no rebound. no guarding. Neuro - A&Ox3. strength 5/5 x4. sensation intact x4. normal gait. Skin - No rash. MSK - normal ROM, (+) Parathyroid wound, clean, dry, and intact. (+) Fetal heart rate, 146.

## 2020-05-20 NOTE — ED ADULT NURSE NOTE - OBJECTIVE STATEMENT
Patient A&Ox4 complaining of dizziness in the am, denies any dizziness in the pm. Patient stated was recently hospitalized x14 days, had parathyroidectomy done on 2020. Was discharged with iron supplements only, no other meds. Patient sent to ED by Dr. Romero for blood work. Patient is . Denies any chest pain, shortness of breath, nausea, vomiting, diarrhea, cough, fever, chills or body aches. Denies any vaginal bleeding or abdominal cramping. Respirations even & unlabored.

## 2020-05-20 NOTE — PHYSICAL EXAM
[General Appearance - Alert] : alert [Sclera] : the sclera and conjunctiva were normal [Neck Appearance] : the appearance of the neck was normal [Outer Ear] : the ears and nose were normal in appearance [Jugular Venous Distention Increased] : there was no jugular-venous distention [Auscultation Breath Sounds / Voice Sounds] : lungs were clear to auscultation bilaterally [Heart Sounds] : normal S1 and S2 [No CVA Tenderness] : no ~M costovertebral angle tenderness [Edema] : there was no peripheral edema [Nail Clubbing] : no clubbing  or cyanosis of the fingernails [] : no rash [FreeTextEntry1] : vertical scar neck. well healed [Mood] : the mood was normal [Oriented To Time, Place, And Person] : oriented to person, place, and time [Affect] : the affect was normal

## 2020-05-20 NOTE — HISTORY OF PRESENT ILLNESS
[FreeTextEntry1] : 34 year old woman  (16 weeks pregnant) recently admitted to [facility] for syncope; found to have multiple electrolyte abnormalities (hypokalemia, hypercalcemia, hypomagnesemia). Work up consistent with primary hyperparathyroidism ; also however with high 1-25 (OH)2 Vitamin D and low 25-Oh vitamin D. Localized adenoma on neck MRI. S/p emergent left superior and inferior parathyroidectomy (on ) given limited therapeutic options in pregnancy. Did well post op with no hypocalcemia. She also had hypokalemia , hypophosphatemia and hypomagnesemia and was  discharged home on supplements.\par \par \par Pt presents today for a follow up appt.\par # [id number]\par She c/o feeling dizzy in the AM, occasional nausea, no vomiting\par States she feels tired and unwell right now\par c/o feeling numbness in fingers; also c/o dull pain in left arm\par Also c/o tingling sensation around the mouth\par Reports she is not taking any medication other than Iron and PEG.  Not taking K+. Mg++ ,phos or Ca++ supplements because she never got the medication.\par Recently saw her OB Dr. Pepe; also went to [facility] for pelvic US\par Also had blood work but does not know results.

## 2020-05-20 NOTE — ED STATDOCS - PROGRESS NOTE DETAILS
33 y/o F pt with significant PMHx of parathyroid removal presents to the ED c/o generalized weakness. She is 16 weeks pregnant. S/p parathyroid removal. She had surgery on 5/7/20. Pt is not taking her medications. No further complaints at this time.

## 2020-05-20 NOTE — ED PROVIDER NOTE - NSCAREINITIATED _GEN_ER
DEMETRIUSW checked in with patient  He is being visited by a friend from [employer] and his wife  Patient says that he continues to be hopeful that he receives the chemo therapy today  Patient is thankful for the regular check  JIM will check in with patient again on Thursday  Itzel Shearer(Attending)

## 2020-05-20 NOTE — ASSESSMENT
[FreeTextEntry1] : Pregnancy; 16 week gestational age\par primary hyperparathyroidism; s/p parathyroidectomy\par Vitamin D deficiency\par hypokalemia, hypomagnesemia, hypophosphatemia ? RTA\par \par She has not taken any of her supplements since discharge\par Currently feels unwell, with perioral tingling and finger tip numbness\par Suspect severe electrolyte abnormalities\par Will send to ED; pt agrees to go to [facility] ED\par Called OB's office and left voicemail\par \par

## 2020-05-20 NOTE — ED PROVIDER NOTE - PATIENT PORTAL LINK FT
You can access the FollowMyHealth Patient Portal offered by [facility] by registering at the following website: [email or web address]. By joining [facility]’s FollowMyHealth portal, you will also be able to view your health information using other applications (apps) compatible with our system.

## 2020-05-20 NOTE — ED PROVIDER NOTE - CLINICAL SUMMARY MEDICAL DECISION MAKING FREE TEXT BOX
labs reviewed.  calcium normal s/p parathyroidectomy.  Fatigue likely related to pregnancy. Pt reassured and instructed to f/up with her OB Dr. Pepe as an outpatient. instructed to return for any new/concerning symptoms.  Pt given a copy of all results and instructed to f/up with pcp regarding any abnormal results.

## 2020-05-20 NOTE — ED ADULT TRIAGE NOTE - CHIEF COMPLAINT QUOTE
pt sent from OB Dr Pepe for not taking meds since hospital d/c 5/10. c/o generalized weakness. pt pregnant LMP 12/23. denies any cramping, vaginal bleeding

## 2020-05-20 NOTE — ED PROVIDER NOTE - NS ED ROS FT
(+) Weakness, (+) Dizziness. No fever/chills, No photophobia/eye pain/changes in vision, No ear pain/sore throat/dysphagia, No chest pain/palpitations, no SOB/cough/wheeze/stridor, No abdominal pain, No N/V/D, no dysuria/frequency/discharge, No neck/back pain, no rash, no changes in neurological status/function.

## 2020-05-20 NOTE — ED PROVIDER NOTE - OBJECTIVE STATEMENT
35 y/o female s/p parathyroidectomy on 5/9/2020 presents to ED, sent in by her OB. Patient reports weakness, and dizziness in the mornings, and some nausea for 8 days. Patient is in her 3rd pregnancy, 16 weeks pregnant.     Denies vomiting, cough, fever  OBGYN: Dr. Pepe  : Halina

## 2020-05-22 ENCOUNTER — APPOINTMENT (OUTPATIENT)
Dept: MATERNAL FETAL MEDICINE | Facility: CLINIC | Age: 35
End: 2020-05-22
Payer: MEDICAID

## 2020-05-22 ENCOUNTER — ASOB RESULT (OUTPATIENT)
Age: 35
End: 2020-05-22

## 2020-05-22 PROCEDURE — 99441: CPT

## 2020-06-10 NOTE — ED ADULT NURSE NOTE - COVID-19 ORDERING FACILITY
Patient resolved from Transition of Care episode on 6/10/2020. ACM/CTN was unsuccessful at contacting this patient today. Patient/family was provided the following resources and education related to COVID-19 during the initial call:                         Signs, symptoms and red flags related to COVID-19            CDC exposure and quarantine guidelines            [facility] exposure contact - [phone]            Contact for their local Department of Health                 Patient has not had any additional ED or hospital visits. No further outreach scheduled with this CTN/ACM. Episode of Care resolved. Patient has this CTN/ACM contact information if future needs arise.
[facility]

## 2020-06-23 ENCOUNTER — LABORATORY RESULT (OUTPATIENT)
Age: 35
End: 2020-06-23

## 2020-06-23 ENCOUNTER — APPOINTMENT (OUTPATIENT)
Dept: ENDOCRINOLOGY | Facility: CLINIC | Age: 35
End: 2020-06-23
Payer: MEDICAID

## 2020-06-23 VITALS
SYSTOLIC BLOOD PRESSURE: 102 MMHG | HEIGHT: 65 IN | WEIGHT: 225 LBS | DIASTOLIC BLOOD PRESSURE: 60 MMHG | BODY MASS INDEX: 37.49 KG/M2

## 2020-06-23 PROCEDURE — 82962 GLUCOSE BLOOD TEST: CPT

## 2020-06-23 PROCEDURE — 99213 OFFICE O/P EST LOW 20 MIN: CPT | Mod: 25

## 2020-06-23 PROCEDURE — 99202 OFFICE O/P NEW SF 15 MIN: CPT | Mod: 25

## 2020-06-23 RX ORDER — PRENATAL VIT NO.130/IRON/FOLIC 27MG-0.8MG
28-0.8 TABLET ORAL DAILY
Qty: 90 | Refills: 0 | Status: ACTIVE | COMMUNITY
Start: 2020-06-23

## 2020-06-23 NOTE — REVIEW OF SYSTEMS
[Fatigue] : fatigue [Recent Weight Gain (___ Lbs)] : recent weight gain: [unfilled] lbs [Dysphagia] : dysphagia [Neck Pain] : neck pain [SOB on Exertion] : shortness of breath on exertion [Polyuria] : polyuria [Hair Loss] : hair loss [Polydipsia] : polydipsia [Blurred Vision] : no blurred vision [Visual Field Defect] : no visual field defect [Chest Pain] : no chest pain [Dysphonia] : no dysphonia [Shortness Of Breath] : no shortness of breath [Palpitations] : no palpitations [Nausea] : no nausea [Constipation] : no constipation [Vomiting] : no vomiting [Diarrhea] : no diarrhea [Headaches] : no headaches [Cold Intolerance] : no cold intolerance [Heat Intolerance] : no heat intolerance [Tremors] : no tremors [FreeTextEntry4] : sensitive anterior neck , 7 weeks post op

## 2020-06-23 NOTE — HISTORY OF PRESENT ILLNESS
[FreeTextEntry1] : HOSPITAL FOLLOW UP\par  Jennifer [id number]\par \par Hyperparathyroidism\par Onset: Admitted to hospital with syncopal episode secondary to severe electrolyte abnormalities. Found to have primary hyperparathyroid secondary to adenoma. \par \par S/P Parathyroidectomy 5/7/2020\par \par C/O:Has been feeling very dizzy for three days, bottom of the feet numbness and tingling, extreme fatigue \par \par Blood sugar in office : 117 (took to rule out hypoG or hyperG)\par \par Current regimen:\par Prenatal vitamin\par \par On no calcium supplements\par \par Hypothyroid:\par Confirms she has a history of hypothyroid but is on no medication for this and is unsure who is monitoring the blood work \par \par Currently 5 months pregnant with baby girl \par OB: Dr. Pepe- will deliver at [facility]\par MARYJO: October 23 2020  \par LMP: February (unsure of date) 2020 \par \par Father recently passed away on June 11

## 2020-06-23 NOTE — PHYSICAL EXAM
[Well Nourished] : well nourished [Alert] : alert [No Acute Distress] : no acute distress [Normal Hearing] : hearing was normal [Normal Sclera/Conjunctiva] : normal sclera/conjunctiva [Well Healed Scar] : well healed scar [No Accessory Muscle Use] : no accessory muscle use [Clear to Auscultation] : lungs were clear to auscultation bilaterally [Normal Rate] : heart rate was normal [Normal S1, S2] : normal S1 and S2 [Not Tender] : non-tender [No Edema] : no peripheral edema [Soft] : abdomen soft [Normal Gait] : normal gait [No Rash] : no rash [No Tremors] : no tremors [Oriented x3] : oriented to person, place, and time [de-identified] : negative chvosteks

## 2020-06-23 NOTE — ASSESSMENT
[FreeTextEntry1] : S/P Parathyroidectomy\par -Need updated electrolytes ASAP, likely calcium needs supplementation\par \par \par Hypothyroid\par -Need updated TFTs ASAP, patient on no medication and 5 months pregnant\par \par \par If TFTs and all electrolytes normal, will advocate to OB how poorly patient is feeling\par \par RTO 4 weeks

## 2020-07-09 NOTE — DISCHARGE NOTE NURSING/CASE MANAGEMENT/SOCIAL WORK - FLU SEASON?
Data  Patient denies cramping, backache, vaginal discharge, pelvic pressure, UTI symptoms, GI problems, bloody show, vaginal bleeding, edema, headache, visual disturbances, epigastric or URQ pain, abdominal pain, rupture of membranes after Triage assessment.  Action: Triage assessment completed. Patient education pamphlets given on fetal movement counts, s/s of bleeding, and follow-up appointments. Patient instructed to report change in fetal movement, vaginal leaking of fluid or bleeding, abdominal pain, or any concerns related to the pregnancy to her Provider.  Response:  Plan per . Patient verbalized understanding of education and verbalized agreement with plan. Discharged ambulatory at 1300.       Yes...

## 2020-07-16 ENCOUNTER — APPOINTMENT (OUTPATIENT)
Dept: ANTEPARTUM | Facility: CLINIC | Age: 35
End: 2020-07-16
Payer: MEDICAID

## 2020-07-16 ENCOUNTER — APPOINTMENT (OUTPATIENT)
Dept: ANTEPARTUM | Facility: CLINIC | Age: 35
End: 2020-07-16

## 2020-07-16 ENCOUNTER — ASOB RESULT (OUTPATIENT)
Age: 35
End: 2020-07-16

## 2020-07-16 PROCEDURE — 76811 OB US DETAILED SNGL FETUS: CPT | Mod: 59

## 2020-07-28 ENCOUNTER — LABORATORY RESULT (OUTPATIENT)
Age: 35
End: 2020-07-28

## 2020-07-28 ENCOUNTER — APPOINTMENT (OUTPATIENT)
Dept: ENDOCRINOLOGY | Facility: CLINIC | Age: 35
End: 2020-07-28
Payer: MEDICAID

## 2020-07-28 VITALS
HEIGHT: 65 IN | DIASTOLIC BLOOD PRESSURE: 60 MMHG | WEIGHT: 228 LBS | SYSTOLIC BLOOD PRESSURE: 100 MMHG | BODY MASS INDEX: 37.99 KG/M2

## 2020-07-28 PROCEDURE — 99213 OFFICE O/P EST LOW 20 MIN: CPT

## 2020-07-28 NOTE — ASSESSMENT
[FreeTextEntry1] : S/P Parathyroidectomy\par -Continue on no calcium supplement, will recheck CMP\par \par \par Hypothyroid\par -Continue on no medication, repeat TFTs monthly in pregnancy \par \par \par RTO 4-6 weeks

## 2020-07-28 NOTE — PHYSICAL EXAM
[Alert] : alert [No Acute Distress] : no acute distress [Well Nourished] : well nourished [Normal Sclera/Conjunctiva] : normal sclera/conjunctiva [Well Healed Scar] : well healed scar [Normal Hearing] : hearing was normal [No Accessory Muscle Use] : no accessory muscle use [Clear to Auscultation] : lungs were clear to auscultation bilaterally [Normal Rate] : heart rate was normal [Normal S1, S2] : normal S1 and S2 [No Edema] : no peripheral edema [Soft] : abdomen soft [Not Tender] : non-tender [Normal Gait] : normal gait [No Rash] : no rash [No Tremors] : no tremors [Oriented x3] : oriented to person, place, and time [de-identified] : negative chvosteks

## 2020-07-28 NOTE — REVIEW OF SYSTEMS
[Fatigue] : fatigue [Recent Weight Gain (___ Lbs)] : recent weight gain: [unfilled] lbs [Visual Field Defect] : no visual field defect [Blurred Vision] : no blurred vision [Dysphagia] : no dysphagia [Neck Pain] : no neck pain [Dysphonia] : no dysphonia [Chest Pain] : no chest pain [Palpitations] : no palpitations [Shortness Of Breath] : shortness of breath [Nausea] : nausea [Constipation] : no constipation [Vomiting] : no vomiting [Diarrhea] : no diarrhea [Polyuria] : polyuria [Dizziness] : dizziness [Polydipsia] : polydipsia [Cold Intolerance] : no cold intolerance [Heat Intolerance] : no heat intolerance [FreeTextEntry4] : burning sensation at the parathyroid site  [FreeTextEntry7] : when she is dizzy

## 2020-07-28 NOTE — REASON FOR VISIT
[Follow - Up] : a follow-up visit [Hypothyroidism] : hypothyroidism [Hypoparathyroidism] : hypoparathyroidism [Hyperparathyroidism] : hyperparathyroidism

## 2020-07-28 NOTE — HISTORY OF PRESENT ILLNESS
[FreeTextEntry1] :  Khushboo [id number]\par \par Hyperparathyroidism\par Onset: Admitted to hospital with syncopal episode secondary to severe electrolyte abnormalities. Found to have primary hyperparathyroid secondary to adenoma. \par \par S/P Parathyroidectomy 5/7/2020\par \par C/O:Has been feeling very dizzy for three days, bottom of the feet numbness and tingling, extreme fatigue (newly diagnosed with asthma/anemia ) \par \par On labs her calcium was normal \par \par Blood sugar in office : 117 (took to rule out hypoG or hyperG)\par \par Current regimen:\par Prenatal vitamin\par Prescribed a nebulizer- cannot purchase right now due to not working/not covered by insurance \par Was not prescribed a medication for the anemia \par \par On no calcium supplements\par \par Hypothyroid:\par On no medication\par TFTs WNL for pregnancy\par \par Currently 25 weeks pregnant with baby girl \par OB: Dr. Pepe- will deliver at [facility]\par MARYJO: October 23 2020  \par LMP: February (unsure of date) 2020 \par \par Father recently passed away on June 11

## 2020-09-09 ENCOUNTER — APPOINTMENT (OUTPATIENT)
Dept: ENDOCRINOLOGY | Facility: CLINIC | Age: 35
End: 2020-09-09
Payer: MEDICAID

## 2020-09-09 VITALS
HEIGHT: 65 IN | WEIGHT: 239 LBS | DIASTOLIC BLOOD PRESSURE: 64 MMHG | SYSTOLIC BLOOD PRESSURE: 106 MMHG | BODY MASS INDEX: 39.82 KG/M2

## 2020-09-09 PROCEDURE — 99214 OFFICE O/P EST MOD 30 MIN: CPT

## 2020-09-09 NOTE — PHYSICAL EXAM
[Well Nourished] : well nourished [No Acute Distress] : no acute distress [Alert] : alert [Normal Sclera/Conjunctiva] : normal sclera/conjunctiva [Normal Hearing] : hearing was normal [Well Healed Scar] : well healed scar [Clear to Auscultation] : lungs were clear to auscultation bilaterally [No Accessory Muscle Use] : no accessory muscle use [Normal Rate] : heart rate was normal [Normal S1, S2] : normal S1 and S2 [Not Tender] : non-tender [No Edema] : no peripheral edema [Soft] : abdomen soft [Normal Gait] : normal gait [No Rash] : no rash [No Tremors] : no tremors [Oriented x3] : oriented to person, place, and time [de-identified] : negative chvosteks

## 2020-09-09 NOTE — HISTORY OF PRESENT ILLNESS
[FreeTextEntry1] :  Khushboo [id number]\par \par Hyperparathyroidism\par Onset: Admitted to hospital with syncopal episode secondary to severe electrolyte abnormalities. Found to have primary hyperparathyroid secondary to adenoma. \par \par C/O feeling dizzy in the morning, no appetite, and has episodes of vomiting\par \par S/P Parathyroidectomy 5/7/2020\par On labs her calcium was normal \par On no calcium supplements\par \par Current regimen:\par Prenatal vitamin\par Prescribed a nebulizer for asthma (having problems with her lungs/unsure what but is followed by pulm)\par \par Hypothyroid:\par On no medication\par TFTs WNL for pregnancy\par \par Currently 8 months pregnant with baby girl \par OB: Dr. Pepe- will deliver at [facility]\par MARYJO: October 23 2020  \par LMP: February (unsure of date) 2020 \par

## 2020-09-09 NOTE — ASSESSMENT
[FreeTextEntry1] : S/P Parathyroidectomy\par -Continue on no calcium supplement, will recheck CMP\par \par Hypothyroid\par -Continue on no medication, repeat TFTs monthly in pregnancy \par \par Labs done today in office\par \par RTO 6 weeks post partum

## 2020-09-09 NOTE — REVIEW OF SYSTEMS
[Recent Weight Gain (___ Lbs)] : recent weight gain: [unfilled] lbs [Visual Field Defect] : no visual field defect [Fatigue] : fatigue [Blurred Vision] : no blurred vision [Dysphonia] : no dysphonia [Neck Pain] : no neck pain [Dysphagia] : no dysphagia [Chest Pain] : no chest pain [Palpitations] : no palpitations [Shortness Of Breath] : shortness of breath [Nausea] : no nausea [SOB on Exertion] : shortness of breath on exertion [Vomiting] : vomiting [Constipation] : no constipation [Polyuria] : polyuria [Diarrhea] : no diarrhea [FreeTextEntry7] : gags and has no appetite  [FreeTextEntry4] : itchy at incision site

## 2020-09-10 ENCOUNTER — LABORATORY RESULT (OUTPATIENT)
Age: 35
End: 2020-09-10

## 2020-10-21 ENCOUNTER — OUTPATIENT (OUTPATIENT)
Dept: OUTPATIENT SERVICES | Facility: HOSPITAL | Age: 35
LOS: 1 days | End: 2020-10-21

## 2020-10-21 VITALS
HEIGHT: 62 IN | SYSTOLIC BLOOD PRESSURE: 108 MMHG | DIASTOLIC BLOOD PRESSURE: 84 MMHG | RESPIRATION RATE: 20 BRPM | TEMPERATURE: 98 F | HEART RATE: 78 BPM

## 2020-10-21 DIAGNOSIS — Z01.818 ENCOUNTER FOR OTHER PREPROCEDURAL EXAMINATION: ICD-10-CM

## 2020-10-21 DIAGNOSIS — Z98.89 OTHER SPECIFIED POSTPROCEDURAL STATES: Chronic | ICD-10-CM

## 2020-10-21 LAB
APPEARANCE UR: CLEAR — SIGNIFICANT CHANGE UP
BACTERIA # UR AUTO: NEGATIVE — SIGNIFICANT CHANGE UP
BASOPHILS # BLD AUTO: 0.03 K/UL — SIGNIFICANT CHANGE UP (ref 0–0.2)
BASOPHILS NFR BLD AUTO: 0.4 % — SIGNIFICANT CHANGE UP (ref 0–2)
BILIRUB UR-MCNC: NEGATIVE — SIGNIFICANT CHANGE UP
BLD GP AB SCN SERPL QL: SIGNIFICANT CHANGE UP
COLOR SPEC: YELLOW — SIGNIFICANT CHANGE UP
DIFF PNL FLD: NEGATIVE — SIGNIFICANT CHANGE UP
EOSINOPHIL # BLD AUTO: 0.05 K/UL — SIGNIFICANT CHANGE UP (ref 0–0.5)
EOSINOPHIL NFR BLD AUTO: 0.6 % — SIGNIFICANT CHANGE UP (ref 0–6)
EPI CELLS # UR: SIGNIFICANT CHANGE UP
GLUCOSE UR QL: NEGATIVE MG/DL — SIGNIFICANT CHANGE UP
HCT VFR BLD CALC: 37 % — SIGNIFICANT CHANGE UP (ref 34.5–45)
HGB BLD-MCNC: 11.8 G/DL — SIGNIFICANT CHANGE UP (ref 11.5–15.5)
IMM GRANULOCYTES NFR BLD AUTO: 0.5 % — SIGNIFICANT CHANGE UP (ref 0–1.5)
KETONES UR-MCNC: NEGATIVE — SIGNIFICANT CHANGE UP
LEUKOCYTE ESTERASE UR-ACNC: ABNORMAL
LYMPHOCYTES # BLD AUTO: 1.92 K/UL — SIGNIFICANT CHANGE UP (ref 1–3.3)
LYMPHOCYTES # BLD AUTO: 24.2 % — SIGNIFICANT CHANGE UP (ref 13–44)
MCHC RBC-ENTMCNC: 25.8 PG — LOW (ref 27–34)
MCHC RBC-ENTMCNC: 31.9 GM/DL — LOW (ref 32–36)
MCV RBC AUTO: 81 FL — SIGNIFICANT CHANGE UP (ref 80–100)
MONOCYTES # BLD AUTO: 0.51 K/UL — SIGNIFICANT CHANGE UP (ref 0–0.9)
MONOCYTES NFR BLD AUTO: 6.4 % — SIGNIFICANT CHANGE UP (ref 2–14)
NEUTROPHILS # BLD AUTO: 5.38 K/UL — SIGNIFICANT CHANGE UP (ref 1.8–7.4)
NEUTROPHILS NFR BLD AUTO: 67.9 % — SIGNIFICANT CHANGE UP (ref 43–77)
NITRITE UR-MCNC: NEGATIVE — SIGNIFICANT CHANGE UP
PH UR: 7 — SIGNIFICANT CHANGE UP (ref 5–8)
PLATELET # BLD AUTO: 274 K/UL — SIGNIFICANT CHANGE UP (ref 150–400)
PROT UR-MCNC: 15 MG/DL
RBC # BLD: 4.57 M/UL — SIGNIFICANT CHANGE UP (ref 3.8–5.2)
RBC # FLD: 16.8 % — HIGH (ref 10.3–14.5)
RBC CASTS # UR COMP ASSIST: NEGATIVE /HPF — SIGNIFICANT CHANGE UP (ref 0–4)
SARS-COV-2 RNA SPEC QL NAA+PROBE: SIGNIFICANT CHANGE UP
SP GR SPEC: 1.01 — SIGNIFICANT CHANGE UP (ref 1.01–1.02)
T PALLIDUM AB TITR SER: NEGATIVE — SIGNIFICANT CHANGE UP
UROBILINOGEN FLD QL: NEGATIVE MG/DL — SIGNIFICANT CHANGE UP
WBC # BLD: 7.93 K/UL — SIGNIFICANT CHANGE UP (ref 3.8–10.5)
WBC # FLD AUTO: 7.93 K/UL — SIGNIFICANT CHANGE UP (ref 3.8–10.5)
WBC UR QL: SIGNIFICANT CHANGE UP

## 2020-10-21 NOTE — OB PST NOTE - PMH
Asthma, unspecified asthma severity, unspecified whether complicated, unspecified whether persistent    COVID-19  3-20  Missed ab  2015  No pertinent past medical history

## 2020-10-22 ENCOUNTER — TRANSCRIPTION ENCOUNTER (OUTPATIENT)
Age: 35
End: 2020-10-22

## 2020-10-22 RX ORDER — OXYTOCIN 10 UNIT/ML
333.33 VIAL (ML) INJECTION
Qty: 20 | Refills: 0 | Status: DISCONTINUED | OUTPATIENT
Start: 2020-10-23 | End: 2020-10-25

## 2020-10-22 RX ORDER — METOCLOPRAMIDE HCL 10 MG
10 TABLET ORAL ONCE
Refills: 0 | Status: DISCONTINUED | OUTPATIENT
Start: 2020-10-23 | End: 2020-10-23

## 2020-10-22 RX ORDER — SODIUM CHLORIDE 9 MG/ML
1000 INJECTION, SOLUTION INTRAVENOUS
Refills: 0 | Status: DISCONTINUED | OUTPATIENT
Start: 2020-10-23 | End: 2020-10-23

## 2020-10-23 ENCOUNTER — INPATIENT (INPATIENT)
Facility: HOSPITAL | Age: 35
LOS: 1 days | Discharge: HOME | End: 2020-10-25
Attending: OBSTETRICS & GYNECOLOGY | Admitting: OBSTETRICS & GYNECOLOGY
Payer: COMMERCIAL

## 2020-10-23 ENCOUNTER — RESULT REVIEW (OUTPATIENT)
Age: 35
End: 2020-10-23

## 2020-10-23 ENCOUNTER — TRANSCRIPTION ENCOUNTER (OUTPATIENT)
Age: 35
End: 2020-10-23

## 2020-10-23 VITALS
TEMPERATURE: 98 F | HEIGHT: 62 IN | WEIGHT: 237 LBS | DIASTOLIC BLOOD PRESSURE: 78 MMHG | SYSTOLIC BLOOD PRESSURE: 130 MMHG | HEART RATE: 85 BPM | RESPIRATION RATE: 20 BRPM

## 2020-10-23 DIAGNOSIS — O34.219 MATERNAL CARE FOR UNSPECIFIED TYPE SCAR FROM PREVIOUS CESAREAN DELIVERY: ICD-10-CM

## 2020-10-23 DIAGNOSIS — Z98.89 OTHER SPECIFIED POSTPROCEDURAL STATES: Chronic | ICD-10-CM

## 2020-10-23 LAB
BLD GP AB SCN SERPL QL: SIGNIFICANT CHANGE UP
HCT VFR BLD CALC: 31.2 % — LOW (ref 34.5–45)
HCT VFR BLD CALC: 36.3 % — SIGNIFICANT CHANGE UP (ref 34.5–45)
HGB BLD-MCNC: 10.1 G/DL — LOW (ref 11.5–15.5)
HGB BLD-MCNC: 11.5 G/DL — SIGNIFICANT CHANGE UP (ref 11.5–15.5)
MCHC RBC-ENTMCNC: 26.1 PG — LOW (ref 27–34)
MCHC RBC-ENTMCNC: 26.4 PG — LOW (ref 27–34)
MCHC RBC-ENTMCNC: 31.7 GM/DL — LOW (ref 32–36)
MCHC RBC-ENTMCNC: 32.4 GM/DL — SIGNIFICANT CHANGE UP (ref 32–36)
MCV RBC AUTO: 81.7 FL — SIGNIFICANT CHANGE UP (ref 80–100)
MCV RBC AUTO: 82.3 FL — SIGNIFICANT CHANGE UP (ref 80–100)
PLATELET # BLD AUTO: 261 K/UL — SIGNIFICANT CHANGE UP (ref 150–400)
PLATELET # BLD AUTO: 263 K/UL — SIGNIFICANT CHANGE UP (ref 150–400)
RBC # BLD: 3.82 M/UL — SIGNIFICANT CHANGE UP (ref 3.8–5.2)
RBC # BLD: 4.41 M/UL — SIGNIFICANT CHANGE UP (ref 3.8–5.2)
RBC # FLD: 16.6 % — HIGH (ref 10.3–14.5)
RBC # FLD: 16.7 % — HIGH (ref 10.3–14.5)
SARS-COV-2 IGG SERPL QL IA: POSITIVE
SARS-COV-2 IGM SERPL IA-ACNC: 87.8 AU/ML — HIGH
T PALLIDUM AB TITR SER: NEGATIVE — SIGNIFICANT CHANGE UP
WBC # BLD: 11.37 K/UL — HIGH (ref 3.8–10.5)
WBC # BLD: 15.06 K/UL — HIGH (ref 3.8–10.5)
WBC # FLD AUTO: 11.37 K/UL — HIGH (ref 3.8–10.5)
WBC # FLD AUTO: 15.06 K/UL — HIGH (ref 3.8–10.5)

## 2020-10-23 PROCEDURE — 59514 CESAREAN DELIVERY ONLY: CPT | Mod: 80,U9

## 2020-10-23 PROCEDURE — 88302 TISSUE EXAM BY PATHOLOGIST: CPT | Mod: 26

## 2020-10-23 PROCEDURE — 58673 LAPAROSCOPY SALPINGOSTOMY: CPT | Mod: 80

## 2020-10-23 RX ORDER — TETANUS TOXOID, REDUCED DIPHTHERIA TOXOID AND ACELLULAR PERTUSSIS VACCINE, ADSORBED 5; 2.5; 8; 8; 2.5 [IU]/.5ML; [IU]/.5ML; UG/.5ML; UG/.5ML; UG/.5ML
0.5 SUSPENSION INTRAMUSCULAR ONCE
Refills: 0 | Status: COMPLETED | OUTPATIENT
Start: 2020-10-23

## 2020-10-23 RX ORDER — ACETAMINOPHEN 500 MG
975 TABLET ORAL
Refills: 0 | Status: DISCONTINUED | OUTPATIENT
Start: 2020-10-23 | End: 2020-10-25

## 2020-10-23 RX ORDER — KETOROLAC TROMETHAMINE 30 MG/ML
30 SYRINGE (ML) INJECTION EVERY 6 HOURS
Refills: 0 | Status: DISCONTINUED | OUTPATIENT
Start: 2020-10-23 | End: 2020-10-24

## 2020-10-23 RX ORDER — CEFAZOLIN SODIUM 1 G
2000 VIAL (EA) INJECTION ONCE
Refills: 0 | Status: COMPLETED | OUTPATIENT
Start: 2020-10-23 | End: 2020-10-23

## 2020-10-23 RX ORDER — ENOXAPARIN SODIUM 100 MG/ML
40 INJECTION SUBCUTANEOUS DAILY
Refills: 0 | Status: DISCONTINUED | OUTPATIENT
Start: 2020-10-23 | End: 2020-10-25

## 2020-10-23 RX ORDER — OXYCODONE HYDROCHLORIDE 5 MG/1
5 TABLET ORAL ONCE
Refills: 0 | Status: DISCONTINUED | OUTPATIENT
Start: 2020-10-23 | End: 2020-10-25

## 2020-10-23 RX ORDER — DEXAMETHASONE 0.5 MG/5ML
4 ELIXIR ORAL EVERY 6 HOURS
Refills: 0 | Status: DISCONTINUED | OUTPATIENT
Start: 2020-10-23 | End: 2020-10-25

## 2020-10-23 RX ORDER — NALOXONE HYDROCHLORIDE 4 MG/.1ML
0.1 SPRAY NASAL
Refills: 0 | Status: DISCONTINUED | OUTPATIENT
Start: 2020-10-23 | End: 2020-10-25

## 2020-10-23 RX ORDER — FAMOTIDINE 10 MG/ML
20 INJECTION INTRAVENOUS ONCE
Refills: 0 | Status: COMPLETED | OUTPATIENT
Start: 2020-10-23 | End: 2020-10-23

## 2020-10-23 RX ORDER — IBUPROFEN 200 MG
600 TABLET ORAL EVERY 6 HOURS
Refills: 0 | Status: COMPLETED | OUTPATIENT
Start: 2020-10-23 | End: 2021-09-21

## 2020-10-23 RX ORDER — OXYCODONE HYDROCHLORIDE 5 MG/1
5 TABLET ORAL
Refills: 0 | Status: DISCONTINUED | OUTPATIENT
Start: 2020-10-23 | End: 2020-10-25

## 2020-10-23 RX ORDER — SODIUM CHLORIDE 9 MG/ML
1000 INJECTION, SOLUTION INTRAVENOUS ONCE
Refills: 0 | Status: COMPLETED | OUTPATIENT
Start: 2020-10-23 | End: 2020-10-23

## 2020-10-23 RX ORDER — OXYTOCIN 10 UNIT/ML
333.33 VIAL (ML) INJECTION
Qty: 20 | Refills: 0 | Status: DISCONTINUED | OUTPATIENT
Start: 2020-10-23 | End: 2020-10-25

## 2020-10-23 RX ORDER — DIPHENHYDRAMINE HCL 50 MG
25 CAPSULE ORAL EVERY 6 HOURS
Refills: 0 | Status: DISCONTINUED | OUTPATIENT
Start: 2020-10-23 | End: 2020-10-25

## 2020-10-23 RX ORDER — MORPHINE SULFATE 50 MG/1
0.2 CAPSULE, EXTENDED RELEASE ORAL ONCE
Refills: 0 | Status: DISCONTINUED | OUTPATIENT
Start: 2020-10-23 | End: 2020-10-25

## 2020-10-23 RX ORDER — LANOLIN
1 OINTMENT (GRAM) TOPICAL EVERY 6 HOURS
Refills: 0 | Status: DISCONTINUED | OUTPATIENT
Start: 2020-10-23 | End: 2020-10-25

## 2020-10-23 RX ORDER — SODIUM CHLORIDE 9 MG/ML
1000 INJECTION, SOLUTION INTRAVENOUS
Refills: 0 | Status: DISCONTINUED | OUTPATIENT
Start: 2020-10-23 | End: 2020-10-25

## 2020-10-23 RX ORDER — SIMETHICONE 80 MG/1
80 TABLET, CHEWABLE ORAL EVERY 4 HOURS
Refills: 0 | Status: DISCONTINUED | OUTPATIENT
Start: 2020-10-23 | End: 2020-10-25

## 2020-10-23 RX ORDER — OXYCODONE HYDROCHLORIDE 5 MG/1
5 TABLET ORAL ONCE
Refills: 0 | Status: DISCONTINUED | OUTPATIENT
Start: 2020-10-23 | End: 2020-10-23

## 2020-10-23 RX ORDER — ONDANSETRON 8 MG/1
4 TABLET, FILM COATED ORAL EVERY 6 HOURS
Refills: 0 | Status: DISCONTINUED | OUTPATIENT
Start: 2020-10-23 | End: 2020-10-25

## 2020-10-23 RX ORDER — MAGNESIUM HYDROXIDE 400 MG/1
30 TABLET, CHEWABLE ORAL
Refills: 0 | Status: DISCONTINUED | OUTPATIENT
Start: 2020-10-23 | End: 2020-10-25

## 2020-10-23 RX ORDER — CITRIC ACID/SODIUM CITRATE 300-500 MG
30 SOLUTION, ORAL ORAL ONCE
Refills: 0 | Status: COMPLETED | OUTPATIENT
Start: 2020-10-23 | End: 2020-10-23

## 2020-10-23 RX ADMIN — Medication 30 MILLILITER(S): at 07:07

## 2020-10-23 RX ADMIN — Medication 30 MILLIGRAM(S): at 23:38

## 2020-10-23 RX ADMIN — Medication 30 MILLIGRAM(S): at 23:54

## 2020-10-23 RX ADMIN — Medication 0.2 MILLIGRAM(S): at 16:03

## 2020-10-23 RX ADMIN — Medication 0.2 MILLIGRAM(S): at 10:12

## 2020-10-23 RX ADMIN — SODIUM CHLORIDE 2000 MILLILITER(S): 9 INJECTION, SOLUTION INTRAVENOUS at 06:26

## 2020-10-23 RX ADMIN — Medication 0.2 MILLIGRAM(S): at 21:23

## 2020-10-23 RX ADMIN — SODIUM CHLORIDE 2000 MILLILITER(S): 9 INJECTION, SOLUTION INTRAVENOUS at 11:00

## 2020-10-23 RX ADMIN — Medication 1000 MILLIUNIT(S)/MIN: at 08:05

## 2020-10-23 RX ADMIN — Medication 30 MILLIGRAM(S): at 09:42

## 2020-10-23 RX ADMIN — OXYCODONE HYDROCHLORIDE 5 MILLIGRAM(S): 5 TABLET ORAL at 10:55

## 2020-10-23 RX ADMIN — Medication 100 MILLIGRAM(S): at 07:40

## 2020-10-23 RX ADMIN — Medication 1000 MILLIUNIT(S)/MIN: at 11:58

## 2020-10-23 RX ADMIN — Medication 1000 MILLIUNIT(S)/MIN: at 09:42

## 2020-10-23 RX ADMIN — SODIUM CHLORIDE 125 MILLILITER(S): 9 INJECTION, SOLUTION INTRAVENOUS at 07:07

## 2020-10-23 RX ADMIN — Medication 30 MILLIGRAM(S): at 17:51

## 2020-10-23 RX ADMIN — FAMOTIDINE 20 MILLIGRAM(S): 10 INJECTION INTRAVENOUS at 07:07

## 2020-10-23 RX ADMIN — OXYCODONE HYDROCHLORIDE 5 MILLIGRAM(S): 5 TABLET ORAL at 11:25

## 2020-10-23 RX ADMIN — Medication 30 MILLIGRAM(S): at 09:57

## 2020-10-23 RX ADMIN — Medication 975 MILLIGRAM(S): at 21:22

## 2020-10-23 RX ADMIN — Medication 975 MILLIGRAM(S): at 22:05

## 2020-10-23 RX ADMIN — Medication 100 MILLIGRAM(S): at 14:34

## 2020-10-23 NOTE — DISCHARGE NOTE OB - MEDICATION SUMMARY - MEDICATIONS TO TAKE
I will START or STAY ON the medications listed below when I get home from the hospital:    ibuprofen 600 mg oral tablet  -- 1 tab(s) by mouth every 8 hours   -- Indication: For pain    oxycodone-acetaminophen 5 mg-325 mg oral tablet  -- 1 tab(s) by mouth every 6 hours MDD:2  -- Caution federal law prohibits the transfer of this drug to any person other  than the person for whom it was prescribed.  May cause drowsiness.  Alcohol may intensify this effect.  Use care when operating dangerous machinery.  This prescription cannot be refilled.  This product contains acetaminophen.  Do not use  with any other product containing acetaminophen to prevent possible liver damage.  Using more of this medication than prescribed may cause serious breathing problems.    -- Indication: For pain    Prenatal Multivitamins with Folic Acid 1 mg oral tablet  -- 1 tab(s) by mouth once a day  -- Indication: For prior med

## 2020-10-23 NOTE — CHART NOTE - NSCHARTNOTEFT_GEN_A_CORE
34 y/o  POD#0 s/p repeat c/s, complicated by PPH, total QBL 1752, s/p IM methergine, rectal cytotec, on methergine series.     S:  Pt seen and examined at bedside, breast feeding. She has no complaints other than hunger. She denies dizziness, chill or palpitations.     O:  Vital Signs Last 24 Hrs  T(C): 36.6 (23 Oct 2020 11:25), Max: 36.8 (23 Oct 2020 06:13)  T(F): 97.9 (23 Oct 2020 11:25), Max: 98.24 (23 Oct 2020 06:23)  HR: 78 (23 Oct 2020 16:16) (34 - 85)  BP: 100/67 (23 Oct 2020 16:16) (100/67 - 130/78)  RR: 20 (23 Oct 2020 16:00) (17 - 23)  SpO2: 99% (23 Oct 2020 16:00) (96% - 100%)    PE:  General: NAD, A&Ox3  Abdomen: soft, appropriately tender, non-distended, no guarding or rebound tenderness, fundus firm at umbilicus, lochia wnl     A/P  34 y/o  POD#0 s/p repeat c/s, complicated by PPH, total QBL 1752, s/p IM methergine, rectal cytotec, on methergine series.   - Vitals wnl  - 4:30 repeat cbc to be drawn  - stable for transfer to postpartum unit  - continue methergine series

## 2020-10-23 NOTE — CHART NOTE - NSCHARTNOTEFT_GEN_A_CORE
Provider called to bedside by nursing due to clots Provider called to bedside in recovery to evaluate patient's heavy lochia.  Examined with Dr Gandhi at bedside  Patient is s/p repeat  at 39w  intraop QBL 650cc      Patient seen and examined at bedside.  Endorses mild abdominal cramping as well as dizziness  Uterine fundus firm at umbilicus. Lower uterine segment atony noted   Bimanual exam performed, cervix 2cm dilated. Approx 168cc followed by 750cc clots expressed.  Patient denies headache, chest pain or shortness of breath.     Vital Signs Last 24 Hrs  Vital Signs Last 24 Hrs  T(C): 36.8 (23 Oct 2020 08:43), Max: 36.8 (23 Oct 2020 06:13)  T(F): 98.2 (23 Oct 2020 08:43), Max: 98.24 (23 Oct 2020 06:23)  HR: 67 (23 Oct 2020 10:30) (34 - 85)  BP: 129/61 (23 Oct 2020 10:30) (105/61 - 130/78)  RR: 20 (23 Oct 2020 09:34) (17 - 20)  SpO2: 98% (23 Oct 2020 09:34) (98% - 100%)       Total QBL now 1568cc  Patient not tacyhcardic; normotensive blood pressure, last 129/61  Uterine fundus firm  Methergine series  Stat CBC  Will continue to monitor    d/w Dr. Gandhi Provider called to bedside in recovery to evaluate patient's heavy lochia.  Examined with Dr Gandhi at bedside  Patient is s/p repeat  at 39w  intraop QBL 650cc      Patient seen and examined at bedside.  Endorses mild abdominal cramping as well as dizziness  Uterine fundus firm at umbilicus. Lower uterine segment atony noted   Bimanual exam performed, cervix 2cm dilated. Approx 168cc followed by 750cc clots expressed.  Patient denies headache, chest pain or shortness of breath.     Vital Signs 24 hrs  T(C): 36.8 (23 Oct 2020 08:43), Max: 36.8 (23 Oct 2020 06:13)  T(F): 98.2 (23 Oct 2020 08:43), Max: 98.24 (23 Oct 2020 06:23)  HR: 67 (23 Oct 2020 10:30) (34 - 85)  BP: 129/61 (23 Oct 2020 10:30) (105/61 - 130/78)  RR: 20 (23 Oct 2020 09:34) (17 - 20)  SpO2: 98% (23 Oct 2020 09:34) (98% - 100%)       Total QBL now 1568cc  Patient not tacyhcardic; normotensive blood pressure, last 129/61  Uterine fundus firm  Methergine series  Stat CBC  LR bolus 1000cc  Will continue to monitor    d/w Dr. Gandhi Provider called to bedside in recovery to evaluate patient's heavy lochia.  Examined with Dr Gandhi at bedside  Patient is s/p repeat  at 39w  intraop QBL 650cc      Patient seen and examined at bedside.  Endorses mild abdominal cramping as well as dizziness  Uterine fundus firm at umbilicus. Lower uterine segment atony noted   Bimanual exam performed, cervix 2cm dilated. Approx 168cc followed by 750cc clots expressed.  Patient denies headache, chest pain or shortness of breath.     Vital Signs 24 hrs  T(C): 36.8 (23 Oct 2020 08:43), Max: 36.8 (23 Oct 2020 06:13)  T(F): 98.2 (23 Oct 2020 08:43), Max: 98.24 (23 Oct 2020 06:23)  HR: 67 (23 Oct 2020 10:30) (34 - 85)  BP: 129/61 (23 Oct 2020 10:30) (105/61 - 130/78)  RR: 20 (23 Oct 2020 09:34) (17 - 20)  SpO2: 98% (23 Oct 2020 09:34) (98% - 100%)       Total QBL now 1568cc  Patient not tacyhcardic; normotensive blood pressure, last 129/61  Uterine fundus firm  Methergine series  Stat CBC  LR bolus 1000cc  Will continue to monitor      --------------------10-23- @ 10:52    Patient re-evaluated after 15 minutes due to nursing report of more clots expressed  uterine fundus firm at umbilicus with improved lower uterine segment tone  Bimanual exam performed; Approx 174cc clots expressed for total 1752cc QBL    -STAT CBC Hgb 11.8->11.5  -vital signs still within normal limits  -rectal cytotec given  -patient in extreme discomfort after bimanual exam; to receive oxycodone  -Will continue to monitor    d/w Dr. Gandhi Provider called to bedside in recovery to evaluate patient's heavy lochia.  Examined with Dr Gandhi at bedside  Patient is s/p repeat  at 39w  intraop QBL 650cc      Patient seen and examined at bedside.  Endorses mild abdominal cramping as well as dizziness  Uterine fundus firm at umbilicus. Lower uterine segment atony noted   Bimanual exam performed, cervix 2cm dilated. Approx 168cc followed by 750cc clots expressed.  Patient denies headache, chest pain or shortness of breath.     Vital Signs 24 hrs  T(C): 36.8 (23 Oct 2020 08:43), Max: 36.8 (23 Oct 2020 06:13)  T(F): 98.2 (23 Oct 2020 08:43), Max: 98.24 (23 Oct 2020 06:23)  HR: 67 (23 Oct 2020 10:30) (34 - 85)  BP: 129/61 (23 Oct 2020 10:30) (105/61 - 130/78)  RR: 20 (23 Oct 2020 09:34) (17 - 20)  SpO2: 98% (23 Oct 2020 09:34) (98% - 100%)       Total QBL now 1568cc  Patient not tacyhcardic; normotensive blood pressure, last 129/61  Uterine fundus firm  Methergine series  Stat CBC  LR bolus 1000cc  Will continue to monitor      --------------------10-23-20 @ 10:52    Patient re-evaluated after 15 minutes due to nursing report of more clots expressed  uterine fundus firm at umbilicus with improved lower uterine segment tone  Bimanual exam performed; Approx 174cc clots expressed for total 1752cc QBL    -STAT CBC Hgb 11.8->11.5  -vital signs still within normal limits  -rectal cytotec given  -patient reports [facility] asthma, last exacerbation this month; not to receive hemabate  -patient in extreme discomfort after bimanual exam; to receive oxycodone  -Timed Stat CBC for 1430  -Will continue to monitor    d/w Dr. Gandhi Provider called to bedside in recovery to evaluate patient's heavy lochia.  Examined with Dr Gandhi at bedside  Patient is s/p repeat  at 39w  intraop QBL 650cc      Patient seen and examined at bedside.  Endorses mild abdominal cramping as well as dizziness  Uterine fundus firm at umbilicus. Lower uterine segment atony noted   Bimanual exam performed, cervix 2cm dilated. Approx 168cc followed by 750cc clots expressed.  Patient denies headache, chest pain or shortness of breath.     Vital Signs 24 hrs  T(C): 36.8 (23 Oct 2020 08:43), Max: 36.8 (23 Oct 2020 06:13)  T(F): 98.2 (23 Oct 2020 08:43), Max: 98.24 (23 Oct 2020 06:23)  HR: 67 (23 Oct 2020 10:30) (34 - 85)  BP: 129/61 (23 Oct 2020 10:30) (105/61 - 130/78)  RR: 20 (23 Oct 2020 09:34) (17 - 20)  SpO2: 98% (23 Oct 2020 09:34) (98% - 100%)       Total QBL now 1568cc  Patient not tacyhcardic; normotensive blood pressure, last 129/61  Uterine fundus firm  Methergine series  Stat CBC  LR bolus 1000cc  Will continue to monitor      --------------------10-23- @ 10:52    Patient re-evaluated after 15 minutes due to nursing report of more clots expressed  uterine fundus firm at umbilicus with improved lower uterine segment tone  Bimanual exam performed; Approx 174cc clots expressed for total 1752cc QBL  Bedside sono done without obvious evidence of retained membranes    -STAT CBC Hgb 11.8->11.5  -vital signs still within normal limits  -rectal cytotec given  -patient reports PMH asthma, last exacerbation this month; not to receive hemabate  -patient in extreme discomfort after bimanual exam; to receive percoset  -Timed Stat CBC for 1630  -will receive additional dose ancef 2g  -Will continue to monitor    d/w Dr. Gandhi

## 2020-10-23 NOTE — OB NEONATOLOGY/PEDIATRICIAN DELIVERY SUMMARY - NSPEDSNEONOTESA_OBGYN_ALL_OB_FT
Requested by  to attend this R C/S/ Vaginal delivery due to of a  y/o G P mom at  weeks GA.  She had + PNC, is  positive, HIV negative, HBsAg negative, GBS negative, Rubella Imm, RPR NR. Maternal history pertinent for.  L&D: AROM at delivery.  Baby born vertex with good cry, transferred to warmer, orally suctioned, dried, and examined.  Baby showed to father and then transferred to mom for STS.  BW:  g  A:   week AGA female with hx of maternal  P: Transition to Regular Nursery under PMD/ Hospitalist care.       Baby left in stable condition with the parents and L&D staff. Requested by Dr. Pepe to attend this R C/S delivery  of a 34 y/o G P mom at 39.1  weeks GA.  She had + PNC, is  O positive, HIV negative, HBsAg negative, GBS positive, Rubella Imm, RPR NR. Maternal history pertinent for COVID-19 positive in March currently negative ( same for FOB)., Thyroid tumor remove and Asthma.  L&D: AROM at delivery.  Baby born vertex one cord around the neck with good cry, transferred to warmer, orally suctioned, dried, copious secretions needed to  do deep suction and examined.  Baby showed to father and then transferred to mom for STS.  BW: 3880  g  A: 39.1  week AGA female maternal Covid negative  P: Transition to Regular Nursery under PMD/ Hospitalist care.       Baby left in stable condition with the parents and L&D staff.

## 2020-10-23 NOTE — DISCHARGE NOTE OB - CARE PLAN
Principal Discharge DX:	S/P   Goal:	pain free  Assessment and plan of treatment:	Please follow up in our office in one week.  Please call sooner if there are any additional concerns.

## 2020-10-23 NOTE — OB RN PATIENT PROFILE - MENTAL HEALTH CONDITIONS/SYMPTOMS, PROFILE
Transfused 1 unit of packed red blood cells 2/20.  Developed acute decompensation of resp status and transferred to ICU  Hgb stable   Follow CBC  Started prophylactic lovenox 2/20  Wound dressing dry   Holding counts well   none

## 2020-10-23 NOTE — CHART NOTE - NSCHARTNOTEFT_GEN_A_CORE
I was notified by nursing that the patient had additional bloodloss in the recovery room.  She now has a total of 1600cc of blood loss.  I was informed that the hospitalist/ gave methergine and sent a CBC and will transfuse as needed.  I thanked them for updating me.

## 2020-10-23 NOTE — DISCHARGE NOTE OB - PATIENT PORTAL LINK FT
You can access the FollowMyHealth Patient Portal offered by [facility] by registering at the following website: [email or web address]. By joining [facility]’s FollowMyHealth portal, you will also be able to view your health information using other applications (apps) compatible with our system.

## 2020-10-23 NOTE — OB RN PATIENT PROFILE - NS_SOCIALWORKCONS_OBGYN_ALL_OB
As discussed at office visit, if there was not improvement would then recommend GI consultation which is what I would do at present. I will place consult request   No

## 2020-10-23 NOTE — DISCHARGE NOTE OB - CARE PROVIDER_API CALL
Umair Pepe  OBSTETRICS AND GYNECOLOGY  [address] [place] [address], [address] [place] [address]  Phone: [phone]  Fax: [phone]  Established Patient  Follow Up Time: 1 week

## 2020-10-23 NOTE — OB RN DELIVERY SUMMARY - NS_SEPSISRSKCALC_OBGYN_ALL_OB_FT
EOS calculated successfully. EOS Risk Factor: 0.5/1000 live births ([facility] national incidence); GA=39w1d; Temp=98.24; ROM=0; GBS='Positive'; Antibiotics='No antibiotics or any antibiotics < 2 hrs prior to birth'

## 2020-10-23 NOTE — DISCHARGE NOTE OB - CALL YOUR HEALTHCARE PROVIDER IF YOU ARE EXPERIENCING SYMPTOMS OF DEPRESSION THAT LAST MORE THAN THREE DAYS
Patient:   LAURIE CRAWFORD            MRN: [id number]            FIN: [id number]              Age:   89 years     Sex:  FEMALE     :  27   Associated Diagnoses:   None   Author:   GENIA, JESSICA Prajapati Complaint   Shortness of breath     Basic Information   History source: Patient.   Medications: Home Medications (9) Active  aspirin 81 mg oral tablet 81 mg = 1 tab, Oral, QAM  calcium-vitamin D 600 mg-400 unit (Caltrate D) [auto-sub to calcium-vitamin D 500 mg-200 unit] 1 tab, Oral, BID  furosemide oral 80 mg tablet 80 mg = 1 tab, Oral, BID  latanoprost ophthalmic 0.005% solution 1 drop, Each Eye, Q Bedtime  lisinopril oral 2.5 mg tablet 2.5 mg = 1 tab, Oral, Daily  metoprolol succinate 25 mg oral capsule, extended release 0.5 tab, Oral, BID  multivitamin oral tablet 1 tab, Oral, Daily  potassium CHLORIDE oral 20 mEq ER tablet (K-Dur) 20 mEq = 1 tab, Oral, BID  traMADol oral 50 mg tablet 50 mg = 1 tab, PRN, Oral, Q4H   .   Allergies: Allergies (1) Active Reaction  NKA None Documented   .   History limitation: None.   Vital signs:   Vitals between:   2019 18:19:22   TO   2019 18:19:22                   LAST RESULT MINIMUM MAXIMUM  Temperature 36.8 36.6 37  Heart Rate 87 55 87  Respiratory Rate 18 16 20  NISBP           119 95 130  NIDBP           70 56 77  NIMBP           93 70 93  SpO2                    99 98 99   .     History of Present Illness     89-year-old woman with cardiac amyloidosis, nonischemic cardiomyopathy, congestive heart failure with preserved ejection fraction, hypertension, dementia and recent right leg DVT (on warfarin from 2018-2018) who presented for evaluation of acute onset of SOB.  Patient is unable to provide many details due to dementia, however, she notes that she had shortness of breath on Monday afternoon.  She does not know how long it lasted.  Onset was  while she was sitting.  Nothing appears to make it better or worse.  No association with  chest pain.  She stayed sitting until resolution and then told her daughter who brought her to the ED for evalation.  She notes intermittent shortness of breath over the last couple of months, with this episode not being longer or more severe than others.  She has had no recurrence of shortness of breath since being at [facility] and follows closely with her [facility]  cardiologists. Per daughter, no new symptoms/complaints. Has been compliant with her medications/doctors vists.    Daughter brings a log of patient's blood pressure, heart rate, and weights. This was reviewed, and patient's weight has been stable in the 110s for the last couple of days.    SH: Denies smoking, alcohol, or illicit drug use  FH: Both parents , daughter has diabetes     Review of Systems   Constitutional symptoms:  No fever, no chills, no sweats, no weakness, no fatigue.   Eye symptoms:   , no recent vision problems, no blurred vision.    ENMT symptoms:   , no ear pain, no sore throat, no nasal congestion.   Cardiovascular symptoms:  Peripheral edema,  , no chest pain, no palpitations, no tachycardia.   Respiratory symptoms:  Shortness of breath,  , no cough, no hemoptysis, no sputum production, no wheezing.   Gastrointestinal symptoms:   , no abdominal pain, no nausea, no vomiting, no diarrhea, no constipation.   Genitourinary symptoms:   , no dysuria, no hematuria.    Musculoskeletal symptoms: no Muscle pain.   Skin symptoms:   , no jaundice, no rash.    Hematologic/Lymphatic symptoms:   , bleeding tendency negative, bruising tendency negative.   Neurologic symptoms:   , no headache, no dizziness, no altered level of consciousness.   Endocrine symptoms:   , no polyuria, no polydipsia, no polyphagia.   Allergy/Immunologic symptoms:   .   Psychiatric symptoms:   , no anxiety, no depression.              Additional review of systems information: All other systems reviewed and otherwise negative.     Past Medical/ Family/ Social History    Medical history: DVT - Deep vein thrombosis  Dementia  HTN (hypertension)   .   Surgical history: No qualifying data available.   .   Family history: No positive family history reported.   .   Social history:    Alcohol  Details: Alcohol Abuse in Household: No.  Use: None.  Exercise  Details: Exercise: Occasionally.  Sexual  Details: Gender on Ins: Female.  Substance Abuse  Details: Substance Abuse in Household: No.  Use: None.  Tobacco  Details: Smoker in [place]: No.  Smoked/Smokeless Tobacco Last 30 Days: No.  Smoking Tobacco Use: Never smoker.  Smokeless Tobacco Use Never.  Cultural/Synagogue Practices  Details: Synagogue or Cultural Practices While in Hospital: No.  .   Problem list:    DVT - Deep vein thrombosis  Dementia  HTN (hypertension)  .     Physical Examination   General:  Alert, no acute distress,  .    Skin:  Pink, moist, no rash,  .    Head:  Normocephalic,  .    Neck:  Supple, no tenderness, no JVD,  .    Eye:  Pupils are equal, round and reactive to light, extraocular movements are intact, normal conjunctiva,  .   Ears, nose, mouth and throat:  Oral mucosa moist, no pharyngeal erythema or exudate,  .   Cardiovascular:  Regular rate and rhythm,  .    Respiratory:  Lungs are clear to auscultation,  .    Gastrointestinal:  Soft, Nontender, Non distended, Normal bowel sounds,  .   Genitourinary:  No tenderness,  .    Back:  Nontender, Normal range of motion,  .    Musculoskeletal:  Normal ROM, normal strength,  .    Neurological:  Alert and oriented to person, place, time, and situation, No focal neurological deficit observed, normal sensory observed, normal motor observed, normal speech observed, normal coordination observed.   Psychiatric:  Cooperative, appropriate mood & affect, normal judgment,  .      Medical Decision Making     Labs between:  21-JAN-2019 18:19 to 22-JAN-2019 18:19    CBC:                 WBC  HgB  Hct  Plt  MCV  RDW   22-JAN-2019 (L) 4.1  (L) 9.8  (L) 29.4  (L) 109  98.3   14.2   21-JAN-2019 (L) 3.9  (L) 10.5  (L) 31.8  (L) 112  98.8  14.3     DIFF:                 Seg  Neutroph//ABS  Lymph//ABS  Mono//ABS  EOS/ABS  22-JAN-2019 NOT APPLICABLE  59 // 2.4 27 // 1.1 11 // 0.5 2 // 0.1  21-JAN-2019 NOT APPLICABLE  66 // 2.6 22 // (L) 0.8  10 // 0.4 1 // (L) 0.0     BMP:                 Na  Cl  BUN  Glu   22-JAN-2019 142  104  18  (H) 126                              K  CO2  Cr  Ca                              3.6  32  (H) 0.96  8.5   BMP:                 Na  Cl  BUN  Glu   21-JAN-2019 140  106  18  (H) 100                              K  CO2  Cr  Ca                              4.0  30  0.88  9.1     CMP:                 AST  ALT  AlkPhos  Bili  Albumin   22-JAN-2019 21  22  105  (H) 1.2  (L) 3.0     Other Chem:             Mg  Phos  Triglycerides  GGTP  DirectBili                           1.9                                  Impression and Plan     Dyspnea/SOB  -Likely due to chronic diastolic heart failure; does not appear to be in acute exacerbation (euvolemic on exam)  -Procal negative; no leukocytosis, afebrile - infection unlikely  -continue po lasix    troponin mildly elevated likely due to CKD 3  -Discussed with cardiology, no further testing necessary, patient cleared for discharge, f/u with OSH cardiology    CKD 3 - stable  Normocytic anemia - stable  cardiac amyloidosis, nonischemic cardiomyopathy, congestive heart failure with preserved ejection fraction-continue home meds  HTN-continue home meds    Level of Care:_Switched to observation and same day discharge  Communication:_RN, patient, family at bedside, cardiology   PCP:_Dr. Evangelina Arita, not available via PS    ---  No recurrence of symptoms, discussed with cardiology, discharge without any changes in medications  f/u with PCP in 1-2 weeks; f/u with cardiology as scheduled   Statement Selected

## 2020-10-24 LAB
HCT VFR BLD CALC: 28.8 % — LOW (ref 34.5–45)
HGB BLD-MCNC: 9.1 G/DL — LOW (ref 11.5–15.5)
MCHC RBC-ENTMCNC: 26.5 PG — LOW (ref 27–34)
MCHC RBC-ENTMCNC: 31.6 GM/DL — LOW (ref 32–36)
MCV RBC AUTO: 84 FL — SIGNIFICANT CHANGE UP (ref 80–100)
PLATELET # BLD AUTO: 210 K/UL — SIGNIFICANT CHANGE UP (ref 150–400)
RBC # BLD: 3.43 M/UL — LOW (ref 3.8–5.2)
RBC # FLD: 16.9 % — HIGH (ref 10.3–14.5)
WBC # BLD: 9.01 K/UL — SIGNIFICANT CHANGE UP (ref 3.8–10.5)
WBC # FLD AUTO: 9.01 K/UL — SIGNIFICANT CHANGE UP (ref 3.8–10.5)

## 2020-10-24 RX ORDER — TETANUS TOXOID, REDUCED DIPHTHERIA TOXOID AND ACELLULAR PERTUSSIS VACCINE, ADSORBED 5; 2.5; 8; 8; 2.5 [IU]/.5ML; [IU]/.5ML; UG/.5ML; UG/.5ML; UG/.5ML
0.5 SUSPENSION INTRAMUSCULAR ONCE
Refills: 0 | Status: COMPLETED | OUTPATIENT
Start: 2020-10-24 | End: 2020-10-24

## 2020-10-24 RX ORDER — IBUPROFEN 200 MG
600 TABLET ORAL EVERY 6 HOURS
Refills: 0 | Status: DISCONTINUED | OUTPATIENT
Start: 2020-10-24 | End: 2020-10-25

## 2020-10-24 RX ADMIN — Medication 0.2 MILLIGRAM(S): at 11:54

## 2020-10-24 RX ADMIN — Medication 600 MILLIGRAM(S): at 12:49

## 2020-10-24 RX ADMIN — Medication 975 MILLIGRAM(S): at 17:01

## 2020-10-24 RX ADMIN — Medication 600 MILLIGRAM(S): at 23:49

## 2020-10-24 RX ADMIN — TETANUS TOXOID, REDUCED DIPHTHERIA TOXOID AND ACELLULAR PERTUSSIS VACCINE, ADSORBED 0.5 MILLILITER(S): 5; 2.5; 8; 8; 2.5 SUSPENSION INTRAMUSCULAR at 23:48

## 2020-10-24 RX ADMIN — Medication 600 MILLIGRAM(S): at 11:55

## 2020-10-24 RX ADMIN — ENOXAPARIN SODIUM 40 MILLIGRAM(S): 100 INJECTION SUBCUTANEOUS at 11:54

## 2020-10-24 RX ADMIN — Medication 0.2 MILLIGRAM(S): at 04:25

## 2020-10-24 RX ADMIN — Medication 30 MILLIGRAM(S): at 05:59

## 2020-10-24 RX ADMIN — Medication 30 MILLIGRAM(S): at 06:14

## 2020-10-24 RX ADMIN — Medication 975 MILLIGRAM(S): at 10:05

## 2020-10-24 RX ADMIN — Medication 975 MILLIGRAM(S): at 09:20

## 2020-10-24 NOTE — PROGRESS NOTE ADULT - SUBJECTIVE AND OBJECTIVE BOX
Postpartum Note,  Section  She is a  35y woman who is now post-operative day 1    She had slightly decreased urine output. I left the ruth in place.  She also had increased blood loss however her postpartum hct was normal      Subjective:  The patient feels well.  She has ambulated and is tolerating a diet  She is having  flatus, but no BM yet  She reports no breathing problems  She reports no headache or visual changes  She reports normal postpartum bleeding    Physical exam:  She generally looks and feels well    Vital Signs Last 24 Hrs  T(C): 37.1 (24 Oct 2020 04:16), Max: 37.1 (24 Oct 2020 04:16)  T(F): 98.7 (24 Oct 2020 04:16), Max: 98.7 (24 Oct 2020 04:16)  HR: 70 (24 Oct 2020 04:16) (34 - 85)  BP: 105/62 (24 Oct 2020 04:16) (100/67 - 130/78)  BP(mean): --  RR: 19 (24 Oct 2020 04:16) (17 - 23)  SpO2: 98% (24 Oct 2020 04:16) (96% - 100%)    Lungs: Normal  Heart: Regular rate and rhythm  Abdomen: Soft, nontender, no distension , firm uterine fundus, the incision is clean dry and intact  Pelvic: Normal lochia noted  Ext: No DVT signs, warm extremities, normal pulses    LABS:                        10.1   15.06 )-----------( 261      ( 23 Oct 2020 16:45 )             31.2                 Allergies    No Known Allergies    Intolerances      MEDICATIONS  (STANDING):  acetaminophen   Tablet .. 975 milliGRAM(s) Oral <User Schedule>  diphtheria/tetanus/pertussis (acellular) Vaccine (ADAcel) 0.5 milliLiter(s) IntraMuscular once  enoxaparin Injectable 40 milliGRAM(s) SubCutaneous daily  ibuprofen  Tablet. 600 milliGRAM(s) Oral every 6 hours  lactated ringers. 1000 milliLiter(s) (125 mL/Hr) IV Continuous <Continuous>  methylergonovine 0.2 milliGRAM(s) Oral every 6 hours  morphine PF Spinal 0.2 milliGRAM(s) IntraThecal. once  oxytocin Infusion 333.333 milliUNIT(s)/Min (1000 mL/Hr) IV Continuous <Continuous>  oxytocin Infusion 333.333 milliUNIT(s)/Min (1000 mL/Hr) IV Continuous <Continuous>    MEDICATIONS  (PRN):  dexAMETHasone  Injectable 4 milliGRAM(s) IV Push every 6 hours PRN Nausea  diphenhydrAMINE 25 milliGRAM(s) Oral every 6 hours PRN Itching  lanolin Ointment 1 Application(s) Topical every 6 hours PRN Sore Nipples  magnesium hydroxide Suspension 30 milliLiter(s) Oral two times a day PRN Constipation  naloxone Injectable 0.1 milliGRAM(s) IV Push every 3 minutes PRN For ANY of the following changes in patient status:  A. Breaths Per Minute LESS THAN 10, B. Oxygen saturation LESS THAN 90%, C. Sedation score of 6 for Stop After: 4 Times  ondansetron Injectable 4 milliGRAM(s) IV Push every 6 hours PRN Nausea  oxyCODONE    IR 5 milliGRAM(s) Oral every 3 hours PRN Moderate to Severe Pain (4-10)  oxyCODONE    IR 5 milliGRAM(s) Oral once PRN Moderate to Severe Pain (4-10)  simethicone 80 milliGRAM(s) Chew every 4 hours PRN Gas

## 2020-10-24 NOTE — PROGRESS NOTE ADULT - ASSESSMENT
Section Day: 1  post op cbc was normal  urine output was decreased    She feels well  Continue the current pain medication  Encourage Ambulation  Encourage regular diet   DVT ppx: SCDs  She is stable, tolerates a diet and has normal flatus but no BM yet  She will be discharged when stable, likely day 2 or 3   according to the normal criteria and her wishes    ruth will be removed at 1pm

## 2020-10-25 VITALS
DIASTOLIC BLOOD PRESSURE: 69 MMHG | OXYGEN SATURATION: 100 % | RESPIRATION RATE: 18 BRPM | HEART RATE: 66 BPM | SYSTOLIC BLOOD PRESSURE: 103 MMHG | TEMPERATURE: 98 F

## 2020-10-25 RX ORDER — ALBUTEROL 90 UG/1
0 AEROSOL, METERED ORAL
Qty: 0 | Refills: 0 | DISCHARGE

## 2020-10-25 RX ORDER — IBUPROFEN 200 MG
1 TABLET ORAL
Qty: 30 | Refills: 2
Start: 2020-10-25 | End: 2020-11-23

## 2020-10-25 RX ADMIN — Medication 975 MILLIGRAM(S): at 09:20

## 2020-10-25 RX ADMIN — Medication 600 MILLIGRAM(S): at 06:25

## 2020-10-25 RX ADMIN — Medication 600 MILLIGRAM(S): at 05:37

## 2020-10-25 RX ADMIN — Medication 600 MILLIGRAM(S): at 00:44

## 2020-10-25 RX ADMIN — Medication 975 MILLIGRAM(S): at 08:28

## 2020-10-27 LAB — SURGICAL PATHOLOGY STUDY: SIGNIFICANT CHANGE UP

## 2020-10-29 PROCEDURE — T1013: CPT

## 2020-10-29 PROCEDURE — 36415 COLL VENOUS BLD VENIPUNCTURE: CPT

## 2020-10-29 PROCEDURE — 86900 BLOOD TYPING SEROLOGIC ABO: CPT

## 2020-10-29 PROCEDURE — 86901 BLOOD TYPING SEROLOGIC RH(D): CPT

## 2020-10-29 PROCEDURE — 90715 TDAP VACCINE 7 YRS/> IM: CPT

## 2020-10-29 PROCEDURE — 86780 TREPONEMA PALLIDUM: CPT

## 2020-10-29 PROCEDURE — G0463: CPT

## 2020-10-29 PROCEDURE — 86769 SARS-COV-2 COVID-19 ANTIBODY: CPT

## 2020-10-29 PROCEDURE — 59050 FETAL MONITOR W/REPORT: CPT

## 2020-10-29 PROCEDURE — 85027 COMPLETE CBC AUTOMATED: CPT

## 2020-10-29 PROCEDURE — 86850 RBC ANTIBODY SCREEN: CPT

## 2020-10-29 PROCEDURE — 88302 TISSUE EXAM BY PATHOLOGIST: CPT

## 2020-10-29 PROCEDURE — 59025 FETAL NON-STRESS TEST: CPT

## 2020-12-06 ENCOUNTER — LABORATORY RESULT (OUTPATIENT)
Age: 35
End: 2020-12-06

## 2020-12-07 ENCOUNTER — APPOINTMENT (OUTPATIENT)
Dept: ENDOCRINOLOGY | Facility: CLINIC | Age: 35
End: 2020-12-07
Payer: MEDICAID

## 2020-12-07 VITALS
TEMPERATURE: 96.4 F | DIASTOLIC BLOOD PRESSURE: 80 MMHG | HEIGHT: 65 IN | SYSTOLIC BLOOD PRESSURE: 110 MMHG | WEIGHT: 230 LBS | BODY MASS INDEX: 38.32 KG/M2

## 2020-12-07 DIAGNOSIS — R63.1 POLYDIPSIA: ICD-10-CM

## 2020-12-07 PROCEDURE — 99072 ADDL SUPL MATRL&STAF TM PHE: CPT

## 2020-12-07 PROCEDURE — 99214 OFFICE O/P EST MOD 30 MIN: CPT

## 2020-12-07 NOTE — PHYSICAL EXAM
[Alert] : alert [Well Nourished] : well nourished [No Acute Distress] : no acute distress [Normal Sclera/Conjunctiva] : normal sclera/conjunctiva [Normal Hearing] : hearing was normal [Well Healed Scar] : well healed scar [No Accessory Muscle Use] : no accessory muscle use [Clear to Auscultation] : lungs were clear to auscultation bilaterally [Normal S1, S2] : normal S1 and S2 [Normal Rate] : heart rate was normal [No Edema] : no peripheral edema [Not Tender] : non-tender [Soft] : abdomen soft [Normal Gait] : normal gait [No Rash] : no rash [No Tremors] : no tremors [Oriented x3] : oriented to person, place, and time [de-identified] : positive chvosteks

## 2020-12-07 NOTE — ASSESSMENT
[FreeTextEntry1] : S/P Parathyroidectomy\par -will recheck CMP but calcium is likely low, pt instructed to take a TUMS , when she is feeling symptomatic in the meantime, also begin vitamin d 5000 IU daily\par \par Hypothyroid\par -Continue on no medication, repeat TFTs now that pt is post partum \par \par Labs done today in office, will call tomorrow with results \par \par RTO 2 months

## 2020-12-07 NOTE — REVIEW OF SYSTEMS
[Fatigue] : fatigue [Recent Weight Loss (___ Lbs)] : recent weight loss: [unfilled] lbs [Constipation] : constipation [Hair Loss] : hair loss [Pain/Numbness of Digits] : pain/numbness of digits [Polydipsia] : polydipsia [Visual Field Defect] : no visual field defect [Blurred Vision] : no blurred vision [Dysphagia] : no dysphagia [Neck Pain] : no neck pain [Dysphonia] : no dysphonia [Chest Pain] : no chest pain [Shortness Of Breath] : no shortness of breath [Nausea] : no nausea [Vomiting] : no vomiting [Diarrhea] : no diarrhea [Polyuria] : no polyuria [Irregular Menses] : regular menses

## 2020-12-07 NOTE — HISTORY OF PRESENT ILLNESS
[FreeTextEntry1] :  Elena [id number]\par \par Hyperparathyroidism\par Baby girl born 10/23/2020 via  [facility]\par Currently breastfeeding \par \par Onset: Admitted to hospital with syncopal episode secondary to severe electrolyte abnormalities. Found to have primary hyperparathyroid secondary to adenoma. \par \par C/O very thirsty , her hands and lips are numb\par \par S/P Parathyroidectomy 2020\par On labs her calcium was normal \par On no calcium supplements\par \par Current regimen:\par Prenatal vitamin\par Prescribed a nebulizer for asthma (having problems with her lungs/unsure what but is followed by pulm)\par \par Hypothyroid:\par On no medication\par TFTs WNL for pregnancy\par \par \par

## 2020-12-08 PROBLEM — J45.909 UNSPECIFIED ASTHMA, UNCOMPLICATED: Chronic | Status: ACTIVE | Noted: 2020-10-21

## 2020-12-08 PROBLEM — O02.1 MISSED ABORTION: Chronic | Status: ACTIVE | Noted: 2020-10-21

## 2021-02-08 ENCOUNTER — APPOINTMENT (OUTPATIENT)
Dept: ENDOCRINOLOGY | Facility: CLINIC | Age: 36
End: 2021-02-08
Payer: MEDICAID

## 2021-02-08 VITALS
WEIGHT: 245 LBS | SYSTOLIC BLOOD PRESSURE: 112 MMHG | HEIGHT: 65 IN | TEMPERATURE: 96.6 F | DIASTOLIC BLOOD PRESSURE: 78 MMHG | BODY MASS INDEX: 40.82 KG/M2

## 2021-02-08 DIAGNOSIS — E89.2 POSTPROCEDURAL HYPOPARATHYROIDISM: ICD-10-CM

## 2021-02-08 DIAGNOSIS — E03.9 HYPOTHYROIDISM, UNSPECIFIED: ICD-10-CM

## 2021-02-08 DIAGNOSIS — E21.0 PRIMARY HYPERPARATHYROIDISM: ICD-10-CM

## 2021-02-08 PROCEDURE — 99213 OFFICE O/P EST LOW 20 MIN: CPT

## 2021-02-08 PROCEDURE — 99072 ADDL SUPL MATRL&STAF TM PHE: CPT

## 2021-02-08 NOTE — PHYSICAL EXAM
[Alert] : alert [Well Nourished] : well nourished [No Acute Distress] : no acute distress [Normal Sclera/Conjunctiva] : normal sclera/conjunctiva [Normal Hearing] : hearing was normal [Well Healed Scar] : well healed scar [No Accessory Muscle Use] : no accessory muscle use [Clear to Auscultation] : lungs were clear to auscultation bilaterally [Normal S1, S2] : normal S1 and S2 [Normal Rate] : heart rate was normal [No Edema] : no peripheral edema [Not Tender] : non-tender [Soft] : abdomen soft [Normal Gait] : normal gait [No Rash] : no rash [No Tremors] : no tremors [Oriented x3] : oriented to person, place, and time [de-identified] : positive chvosteks

## 2021-02-08 NOTE — REVIEW OF SYSTEMS
[Fatigue] : fatigue [Recent Weight Gain (___ Lbs)] : recent weight gain: [unfilled] lbs [Recent Weight Loss (___ Lbs)] : no recent weight loss [Visual Field Defect] : no visual field defect [Blurred Vision] : no blurred vision [Dysphagia] : no dysphagia [Neck Pain] : no neck pain [Dysphonia] : no dysphonia [Chest Pain] : no chest pain [Shortness Of Breath] : no shortness of breath [Constipation] : no constipation [Diarrhea] : no diarrhea [Polyuria] : no polyuria [Polydipsia] : no polydipsia

## 2021-02-08 NOTE — ASSESSMENT
[FreeTextEntry1] : S/P Parathyroidectomy\par -will recheck CMP  pt instructed to take a TUMS PRN , when she is feeling symptomatic in the meantime, and continue vitamin d 5000 IU daily\par \par Hypothyroid\par -Continue on no medication, repeat TFTs, will continue to monitor\par \par Labs need to be done ASAP, will call with results\par \par RTO 2 months

## 2021-02-08 NOTE — HISTORY OF PRESENT ILLNESS
[FreeTextEntry1] :  Edwina [id number]\par \par Hyperparathyroidism\par Baby girl born 10/23/2020 via  [facility]\meghan Onset: Admitted to hospital with syncopal episode secondary to severe electrolyte abnormalities. Found to have primary hyperparathyroid secondary to adenoma. \par \par C/O pain and tingling in left leg\par Has gained 15 lbs in the last 6 weeks\par Diet: whole wheat bread, coffee, fish broccoli, chicken breast, milk\par Exercise: does not exercise \par \par S/P Parathyroidectomy 2020\par On labs her calcium was normal -takes TUMs when symptomatic \par On no calcium supplements\par Vit d low on labs- On daily 5000 IU supplement \par \par Current regimen:\par Prenatal vitamin\par Prescribed a nebulizer for asthma (having problems with her lungs/unsure what but is followed by pulm)\par \par Hypothyroid:\par On no medication\par Last TFTs WNL post partum\par \par \par

## 2021-02-09 ENCOUNTER — LABORATORY RESULT (OUTPATIENT)
Age: 36
End: 2021-02-09

## 2021-02-23 ENCOUNTER — NON-APPOINTMENT (OUTPATIENT)
Age: 36
End: 2021-02-23

## 2021-02-25 ENCOUNTER — NON-APPOINTMENT (OUTPATIENT)
Age: 36
End: 2021-02-25

## 2021-03-27 NOTE — ASU PREOP CHECKLIST - ISOLATION PRECAUTIONS
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

## 2021-04-13 ENCOUNTER — APPOINTMENT (OUTPATIENT)
Dept: ENDOCRINOLOGY | Facility: CLINIC | Age: 36
End: 2021-04-13

## 2021-04-26 ENCOUNTER — OUTPATIENT (OUTPATIENT)
Dept: OUTPATIENT SERVICES | Facility: HOSPITAL | Age: 36
LOS: 1 days | End: 2021-04-26
Payer: COMMERCIAL

## 2021-04-26 VITALS
WEIGHT: 245.37 LBS | RESPIRATION RATE: 20 BRPM | TEMPERATURE: 98 F | HEIGHT: 65 IN | SYSTOLIC BLOOD PRESSURE: 128 MMHG | HEART RATE: 89 BPM | DIASTOLIC BLOOD PRESSURE: 80 MMHG

## 2021-04-26 DIAGNOSIS — K80.20 CALCULUS OF GALLBLADDER WITHOUT CHOLECYSTITIS WITHOUT OBSTRUCTION: ICD-10-CM

## 2021-04-26 DIAGNOSIS — Z29.9 ENCOUNTER FOR PROPHYLACTIC MEASURES, UNSPECIFIED: ICD-10-CM

## 2021-04-26 DIAGNOSIS — Z13.89 ENCOUNTER FOR SCREENING FOR OTHER DISORDER: ICD-10-CM

## 2021-04-26 DIAGNOSIS — E89.2 POSTPROCEDURAL HYPOPARATHYROIDISM: Chronic | ICD-10-CM

## 2021-04-26 DIAGNOSIS — Z01.818 ENCOUNTER FOR OTHER PREPROCEDURAL EXAMINATION: ICD-10-CM

## 2021-04-26 DIAGNOSIS — Z98.89 OTHER SPECIFIED POSTPROCEDURAL STATES: Chronic | ICD-10-CM

## 2021-04-26 LAB
ALBUMIN SERPL ELPH-MCNC: 4.3 G/DL — SIGNIFICANT CHANGE UP (ref 3.3–5.2)
ALP SERPL-CCNC: 116 U/L — SIGNIFICANT CHANGE UP (ref 40–120)
ALT FLD-CCNC: 16 U/L — SIGNIFICANT CHANGE UP
AMYLASE P1 CFR SERPL: 50 U/L — SIGNIFICANT CHANGE UP (ref 36–128)
ANION GAP SERPL CALC-SCNC: 14 MMOL/L — SIGNIFICANT CHANGE UP (ref 5–17)
APPEARANCE UR: CLEAR — SIGNIFICANT CHANGE UP
APTT BLD: 39.5 SEC — HIGH (ref 27.5–35.5)
AST SERPL-CCNC: 18 U/L — SIGNIFICANT CHANGE UP
BACTERIA # UR AUTO: ABNORMAL
BASOPHILS # BLD AUTO: 0.06 K/UL — SIGNIFICANT CHANGE UP (ref 0–0.2)
BASOPHILS NFR BLD AUTO: 0.5 % — SIGNIFICANT CHANGE UP (ref 0–2)
BILIRUB SERPL-MCNC: <0.2 MG/DL — LOW (ref 0.4–2)
BILIRUB UR-MCNC: NEGATIVE — SIGNIFICANT CHANGE UP
BLD GP AB SCN SERPL QL: SIGNIFICANT CHANGE UP
BUN SERPL-MCNC: 13 MG/DL — SIGNIFICANT CHANGE UP (ref 8–20)
CALCIUM SERPL-MCNC: 9.6 MG/DL — SIGNIFICANT CHANGE UP (ref 8.6–10.2)
CHLORIDE SERPL-SCNC: 101 MMOL/L — SIGNIFICANT CHANGE UP (ref 98–107)
CO2 SERPL-SCNC: 26 MMOL/L — SIGNIFICANT CHANGE UP (ref 22–29)
COLOR SPEC: YELLOW — SIGNIFICANT CHANGE UP
COMMENT - URINE: SIGNIFICANT CHANGE UP
CREAT SERPL-MCNC: 0.59 MG/DL — SIGNIFICANT CHANGE UP (ref 0.5–1.3)
DIFF PNL FLD: NEGATIVE — SIGNIFICANT CHANGE UP
EOSINOPHIL # BLD AUTO: 0.14 K/UL — SIGNIFICANT CHANGE UP (ref 0–0.5)
EOSINOPHIL NFR BLD AUTO: 1.1 % — SIGNIFICANT CHANGE UP (ref 0–6)
EPI CELLS # UR: ABNORMAL
GLUCOSE SERPL-MCNC: 87 MG/DL — SIGNIFICANT CHANGE UP (ref 70–99)
GLUCOSE UR QL: NEGATIVE MG/DL — SIGNIFICANT CHANGE UP
HCT VFR BLD CALC: 39.3 % — SIGNIFICANT CHANGE UP (ref 34.5–45)
HGB BLD-MCNC: 11.7 G/DL — SIGNIFICANT CHANGE UP (ref 11.5–15.5)
IMM GRANULOCYTES NFR BLD AUTO: 0.2 % — SIGNIFICANT CHANGE UP (ref 0–1.5)
INR BLD: 1.26 RATIO — HIGH (ref 0.88–1.16)
KETONES UR-MCNC: NEGATIVE — SIGNIFICANT CHANGE UP
LEUKOCYTE ESTERASE UR-ACNC: ABNORMAL
LIDOCAIN IGE QN: 24 U/L — SIGNIFICANT CHANGE UP (ref 22–51)
LYMPHOCYTES # BLD AUTO: 25.6 % — SIGNIFICANT CHANGE UP (ref 13–44)
LYMPHOCYTES # BLD AUTO: 3.25 K/UL — SIGNIFICANT CHANGE UP (ref 1–3.3)
MAGNESIUM SERPL-MCNC: 2.1 MG/DL — SIGNIFICANT CHANGE UP (ref 1.6–2.6)
MCHC RBC-ENTMCNC: 22.7 PG — LOW (ref 27–34)
MCHC RBC-ENTMCNC: 29.8 GM/DL — LOW (ref 32–36)
MCV RBC AUTO: 76.2 FL — LOW (ref 80–100)
MONOCYTES # BLD AUTO: 0.65 K/UL — SIGNIFICANT CHANGE UP (ref 0–0.9)
MONOCYTES NFR BLD AUTO: 5.1 % — SIGNIFICANT CHANGE UP (ref 2–14)
NEUTROPHILS # BLD AUTO: 8.55 K/UL — HIGH (ref 1.8–7.4)
NEUTROPHILS NFR BLD AUTO: 67.5 % — SIGNIFICANT CHANGE UP (ref 43–77)
NITRITE UR-MCNC: NEGATIVE — SIGNIFICANT CHANGE UP
PH UR: 6 — SIGNIFICANT CHANGE UP (ref 5–8)
PLATELET # BLD AUTO: 378 K/UL — SIGNIFICANT CHANGE UP (ref 150–400)
POTASSIUM SERPL-MCNC: 3.7 MMOL/L — SIGNIFICANT CHANGE UP (ref 3.5–5.3)
POTASSIUM SERPL-SCNC: 3.7 MMOL/L — SIGNIFICANT CHANGE UP (ref 3.5–5.3)
PROT SERPL-MCNC: 7.9 G/DL — SIGNIFICANT CHANGE UP (ref 6.6–8.7)
PROT UR-MCNC: NEGATIVE MG/DL — SIGNIFICANT CHANGE UP
PROTHROM AB SERPL-ACNC: 14.5 SEC — HIGH (ref 10.6–13.6)
RBC # BLD: 5.16 M/UL — SIGNIFICANT CHANGE UP (ref 3.8–5.2)
RBC # FLD: 16.4 % — HIGH (ref 10.3–14.5)
RBC CASTS # UR COMP ASSIST: SIGNIFICANT CHANGE UP /HPF (ref 0–4)
SARS-COV-2 RNA SPEC QL NAA+PROBE: SIGNIFICANT CHANGE UP
SODIUM SERPL-SCNC: 141 MMOL/L — SIGNIFICANT CHANGE UP (ref 135–145)
SP GR SPEC: 1.01 — SIGNIFICANT CHANGE UP (ref 1.01–1.02)
UROBILINOGEN FLD QL: NEGATIVE MG/DL — SIGNIFICANT CHANGE UP
WBC # BLD: 12.68 K/UL — HIGH (ref 3.8–10.5)
WBC # FLD AUTO: 12.68 K/UL — HIGH (ref 3.8–10.5)
WBC UR QL: SIGNIFICANT CHANGE UP

## 2021-04-26 PROCEDURE — 93010 ELECTROCARDIOGRAM REPORT: CPT

## 2021-04-26 PROCEDURE — 93005 ELECTROCARDIOGRAM TRACING: CPT

## 2021-04-26 PROCEDURE — G0463: CPT

## 2021-04-26 RX ORDER — CEFAZOLIN SODIUM 1 G
2000 VIAL (EA) INJECTION ONCE
Refills: 0 | Status: DISCONTINUED | OUTPATIENT
Start: 2021-04-29 | End: 2021-05-13

## 2021-04-26 NOTE — H&P PST ADULT - DENTITION
5/9/19 Patient: Yared Chirinos YOB: 1960 Date of Visit: 5/9/2019 Lynn Joshi, [address] [id number] VIA In Basket Dear Lynn Joshi PA-C, Thank you for referring Mr. Lindell Schaumann to [address] [place] [address] for evaluation. My notes for this consultation are attached. If you have questions, please do not hesitate to call me. I look forward to following your patient along with you. Sincerely, Gonzalo Trejo MD 
 

normal

## 2021-04-26 NOTE — H&P PST ADULT - EKG AND INTERPRETATION
NSR, HR , official reading pending NSR with 1st degree AV block, HR , official reading pending NSR with occasional PVCs, HR 79, official reading pending

## 2021-04-26 NOTE — H&P PST ADULT - ASSESSMENT
34 yo F    OPIOID RISK TOOL    JUVENCIO EACH BOX THAT APPLIES AND ADD TOTALS AT THE END    FAMILY HISTORY OF SUBSTANCE ABUSE                 FEMALE         MALE                                                Alcohol                             [  ]1 pt          [  ]3pts                                               Illegal Durgs                     [  ]2 pts        [  ]3pts                                               Rx Drugs                           [  ]4 pts        [  ]4 pts    PERSONAL HISTORY OF SUBSTANCE ABUSE                                                                                          Alcohol                             [  ]3 pts       [  ]3 pts                                               Illegal Drugs                     [  ]4 pts        [  ]4 pts                                               Rx Drugs                           [  ]5 pts        [  ]5 pts    AGE BETWEEN 16-45 YEARS                                      [x  ]1 pt         [  ]1 pt    HISTORY OF PREADOLESCENT   SEXUAL ABUSE                                                             [  ]3 pts        [  ]0pts    PSYCHOLOGICAL DISEASE                     ADD, OCD, Bipolar, Schizophrenia        [  ]2 pts         [  ]2 pts                      Depression                                               [  ]1 pt           [  ]1 pt           SCORING TOTAL   (add numbers and type here)              ( 1 )                                     A score of 3 or lower indicated LOW risk for future opioid abuse  A score of 4 to 7 indicated moderate risk for future opioid abuse  A score of 8 or higher indicates a high risk for opioid abuse    CAPRINI VTE 2.0 SCORE [CLOT updated 2019]    AGE RELATED RISK FACTORS                                                       MOBILITY RELATED FACTORS  [ ] Age 41-60 years                                            (1 Point)                    [ ] Bed rest                                                        (1 Point)  [ ] Age: 61-74 years                                           (2 Points)                  [ ] Plaster cast                                                   (2 Points)  [ ] Age= 75 years                                              (3 Points)                    [ ] Bed bound for more than 72 hours                 (2 Points)    DISEASE RELATED RISK FACTORS                                               GENDER SPECIFIC FACTORS  [x ] Edema in the lower extremities                       (1 Point)              [ ] Pregnancy                                                     (1 Point)  [ ] Varicose veins                                               (1 Point)                     [ ] Post-partum < 6 weeks                                   (1 Point)             [ x] BMI > 25 Kg/m2                                            (1 Point)                     [ ] Hormonal therapy  or oral contraception          (1 Point)                 [ ] Sepsis (in the previous month)                        (1 Point)               [ ] History of pregnancy complications                 (1 point)  [ ] Pneumonia or serious lung disease                                               [ ] Unexplained or recurrent                     (1 Point)           (in the previous month)                               (1 Point)  [ ] Abnormal pulmonary function test                     (1 Point)                 SURGERY RELATED RISK FACTORS  [ ] Acute myocardial infarction                              (1 Point)               [ ]  Section                                             (1 Point)  [ ] Congestive heart failure (in the previous month)  (1 Point)      [ ] Minor surgery                                                  (1 Point)   [ ] Inflammatory bowel disease                             (1 Point)               [ ] Arthroscopic surgery                                        (2 Points)  [ ] Central venous access                                      (2 Points)                [x ] General surgery lasting more than 45 minutes (2 points)  [ ] Malignancy- Present or previous                   (2 Points)                [ ] Elective arthroplasty                                         (5 points)    [ ] Stroke (in the previous month)                          (5 Points)                                                                                                                                                           HEMATOLOGY RELATED FACTORS                                                 TRAUMA RELATED RISK FACTORS  [ ] Prior episodes of VTE                                     (3 Points)                [ ] Fracture of the hip, pelvis, or leg                       (5 Points)  [ ] Positive family history for VTE                         (3 Points)             [ ] Acute spinal cord injury (in the previous month)  (5 Points)  [ ] Prothrombin 01462 A                                     (3 Points)               [ ] Paralysis  (less than 1 month)                             (5 Points)  [ ] Factor V Leiden                                             (3 Points)                  [ ] Multiple Trauma within 1 month                        (5 Points)  [ ] Lupus anticoagulants                                     (3 Points)                                                           [ ] Anticardiolipin antibodies                               (3 Points)                                                       [ ] High homocysteine in the blood                      (3 Points)                                             [ ] Other congenital or acquired thrombophilia      (3 Points)                                                [ ] Heparin induced thrombocytopenia                  (3 Points)                                     Total Score [    4      ] 36 yo F  Csection x3 LMP 2021, most recent birth 10/[birth year]/2020, PMH of morbid obesity (BMI 40.8), PSH of parathyroidectomy on 2020 presents with c/o intermittent sharp and severe epigastric pain for about two months, the pain is worse with eating or drinking, especially when eating fatty foods. Sometimes her pain is so sharp that it pis painful for her to take a breath. She reports her pain is radiating to her left back. Patient also c/o nausea, last episode of nausea was on , and of diarrhea almost every time after she eats. Presents for preop assessment prior to laparoscopic, possible open cholecystectomy w/Dr Finkelstein on     Pt was educated on preop preparation with written and verbal instructions. Pt was informed to obtain medical clearance. Pt was educated on NSAIDs, multivitamins and herbals that increase the risk of bleeding and need to be stopped 7 days before procedure. Pt was educated on covid testing and covid prevention, i.e. social distancing, handwashing, mask wearing. Pt was advised that if her pain increases significantly and makes her breathing difficult, she must go to ER. Pt verbalized understanding. [employer]  ([place], ID# [id number]) was utilized throughout the encounter.      OPIOID RISK TOOL    JUVENCIO EACH BOX THAT APPLIES AND ADD TOTALS AT THE END    FAMILY HISTORY OF SUBSTANCE ABUSE                 FEMALE         MALE                                                Alcohol                             [  ]1 pt          [  ]3pts                                               Illegal Drugs                     [  ]2 pts        [  ]3pts                                               Rx Drugs                           [  ]4 pts        [  ]4 pts    PERSONAL HISTORY OF SUBSTANCE ABUSE                                                                                          Alcohol                             [  ]3 pts       [  ]3 pts                                               Illegal Drugs                     [  ]4 pts        [  ]4 pts                                               Rx Drugs                           [  ]5 pts        [  ]5 pts    AGE BETWEEN 16-45 YEARS                                      [x ]1 pt         [  ]1 pt    HISTORY OF PREADOLESCENT   SEXUAL ABUSE                                                             [  ]3 pts        [  ]0pts    PSYCHOLOGICAL DISEASE                     ADD, OCD, Bipolar, Schizophrenia        [  ]2 pts         [  ]2 pts                      Depression                                               [  ]1 pt           [  ]1 pt           SCORING TOTAL   (add numbers and type here)              ( 1 )                                     A score of 3 or lower indicated LOW risk for future opioid abuse  A score of 4 to 7 indicated moderate risk for future opioid abuse  A score of 8 or higher indicates a high risk for opioid abuse    CAPRINI VTE 2.0 SCORE [CLOT updated 2019]    AGE RELATED RISK FACTORS                                                       MOBILITY RELATED FACTORS  [ ] Age 41-60 years                                            (1 Point)                    [ ] Bed rest                                                        (1 Point)  [ ] Age: 61-74 years                                           (2 Points)                  [ ] Plaster cast                                                   (2 Points)  [ ] Age= 75 years                                              (3 Points)                    [ ] Bed bound for more than 72 hours                 (2 Points)    DISEASE RELATED RISK FACTORS                                               GENDER SPECIFIC FACTORS  [x ] Edema in the lower extremities                       (1 Point)              [ ] Pregnancy                                                     (1 Point)  [ ] Varicose veins                                               (1 Point)                     [ ] Post-partum < 6 weeks                                   (1 Point)             [ x] BMI > 25 Kg/m2                                            (1 Point)                     [ ] Hormonal therapy  or oral contraception          (1 Point)                 [ ] Sepsis (in the previous month)                        (1 Point)               [ ] History of pregnancy complications                 (1 point)  [ ] Pneumonia or serious lung disease                                               [ ] Unexplained or recurrent                     (1 Point)           (in the previous month)                               (1 Point)  [ ] Abnormal pulmonary function test                     (1 Point)                 SURGERY RELATED RISK FACTORS  [ ] Acute myocardial infarction                              (1 Point)               [ ]  Section                                             (1 Point)  [ ] Congestive heart failure (in the previous month)  (1 Point)      [ ] Minor surgery                                                  (1 Point)   [ ] Inflammatory bowel disease                             (1 Point)               [ ] Arthroscopic surgery                                        (2 Points)  [ ] Central venous access                                      (2 Points)                [x ] General surgery lasting more than 45 minutes (2 points)  [ ] Malignancy- Present or previous                   (2 Points)                [ ] Elective arthroplasty                                         (5 points)    [ ] Stroke (in the previous month)                          (5 Points)                                                                                                                                                           HEMATOLOGY RELATED FACTORS                                                 TRAUMA RELATED RISK FACTORS  [ ] Prior episodes of VTE                                     (3 Points)                [ ] Fracture of the hip, pelvis, or leg                       (5 Points)  [ ] Positive family history for VTE                         (3 Points)             [ ] Acute spinal cord injury (in the previous month)  (5 Points)  [ ] Prothrombin 88214 A                                     (3 Points)               [ ] Paralysis  (less than 1 month)                             (5 Points)  [ ] Factor V Leiden                                             (3 Points)                  [ ] Multiple Trauma within 1 month                        (5 Points)  [ ] Lupus anticoagulants                                     (3 Points)                                                           [ ] Anticardiolipin antibodies                               (3 Points)                                                       [ ] High homocysteine in the blood                      (3 Points)                                             [ ] Other congenital or acquired thrombophilia      (3 Points)                                                [ ] Heparin induced thrombocytopenia                  (3 Points)                                     Total Score [    4      ] 36 yo F  Csection x3 LMP 2021, most recent birth [birth year]2020, PMH of morbid obesity (BMI 40.8), PSH of parathyroidectomy on 2020 presents with c/o intermittent sharp and severe epigastric pain for about two months, the pain is worse with eating or drinking, especially when eating fatty foods. Sometimes her pain is so sharp that it is painful for her to take a breath. Today she reports her pain is now radiating to her left back. Patient also c/o nausea, last episode of nausea was on , and of diarrhea almost every time after she eats. Presents for preop assessment prior to laparoscopic, possible open cholecystectomy w/Dr Finkelstein on     Pt was educated on preop preparation with written and verbal instructions. Pt was informed to obtain medical clearance. Pt was educated on NSAIDs, multivitamins and herbals that increase the risk of bleeding and need to be stopped 7 days before procedure. Pt was educated on covid testing and covid prevention, i.e. social distancing, handwashing, mask wearing. Pt was advised that if her pain increases significantly and makes her breathing difficult, she must go to ER. Pt verbalized understanding. [employer]  ([place], ID# [id number]) was utilized throughout the encounter.      OPIOID RISK TOOL    JUVENCIO EACH BOX THAT APPLIES AND ADD TOTALS AT THE END    FAMILY HISTORY OF SUBSTANCE ABUSE                 FEMALE         MALE                                                Alcohol                             [  ]1 pt          [  ]3pts                                               Illegal Drugs                     [  ]2 pts        [  ]3pts                                               Rx Drugs                           [  ]4 pts        [  ]4 pts    PERSONAL HISTORY OF SUBSTANCE ABUSE                                                                                          Alcohol                             [  ]3 pts       [  ]3 pts                                               Illegal Drugs                     [  ]4 pts        [  ]4 pts                                               Rx Drugs                           [  ]5 pts        [  ]5 pts    AGE BETWEEN 16-45 YEARS                                      [x ]1 pt         [  ]1 pt    HISTORY OF PREADOLESCENT   SEXUAL ABUSE                                                             [  ]3 pts        [  ]0pts    PSYCHOLOGICAL DISEASE                     ADD, OCD, Bipolar, Schizophrenia        [  ]2 pts         [  ]2 pts                      Depression                                               [  ]1 pt           [  ]1 pt           SCORING TOTAL   (add numbers and type here)              ( 1 )                                     A score of 3 or lower indicated LOW risk for future opioid abuse  A score of 4 to 7 indicated moderate risk for future opioid abuse  A score of 8 or higher indicates a high risk for opioid abuse    CAPRINI VTE 2.0 SCORE [CLOT updated 2019]    AGE RELATED RISK FACTORS                                                       MOBILITY RELATED FACTORS  [ ] Age 41-60 years                                            (1 Point)                    [ ] Bed rest                                                        (1 Point)  [ ] Age: 61-74 years                                           (2 Points)                  [ ] Plaster cast                                                   (2 Points)  [ ] Age= 75 years                                              (3 Points)                    [ ] Bed bound for more than 72 hours                 (2 Points)    DISEASE RELATED RISK FACTORS                                               GENDER SPECIFIC FACTORS  [x ] Edema in the lower extremities                       (1 Point)              [ ] Pregnancy                                                     (1 Point)  [ ] Varicose veins                                               (1 Point)                     [ ] Post-partum < 6 weeks                                   (1 Point)             [ x] BMI > 25 Kg/m2                                            (1 Point)                     [ ] Hormonal therapy  or oral contraception          (1 Point)                 [ ] Sepsis (in the previous month)                        (1 Point)               [ ] History of pregnancy complications                 (1 point)  [ ] Pneumonia or serious lung disease                                               [ ] Unexplained or recurrent                     (1 Point)           (in the previous month)                               (1 Point)  [ ] Abnormal pulmonary function test                     (1 Point)                 SURGERY RELATED RISK FACTORS  [ ] Acute myocardial infarction                              (1 Point)               [ ]  Section                                             (1 Point)  [ ] Congestive heart failure (in the previous month)  (1 Point)      [ ] Minor surgery                                                  (1 Point)   [ ] Inflammatory bowel disease                             (1 Point)               [ ] Arthroscopic surgery                                        (2 Points)  [ ] Central venous access                                      (2 Points)                [x ] General surgery lasting more than 45 minutes (2 points)  [ ] Malignancy- Present or previous                   (2 Points)                [ ] Elective arthroplasty                                         (5 points)    [ ] Stroke (in the previous month)                          (5 Points)                                                                                                                                                           HEMATOLOGY RELATED FACTORS                                                 TRAUMA RELATED RISK FACTORS  [ ] Prior episodes of VTE                                     (3 Points)                [ ] Fracture of the hip, pelvis, or leg                       (5 Points)  [ ] Positive family history for VTE                         (3 Points)             [ ] Acute spinal cord injury (in the previous month)  (5 Points)  [ ] Prothrombin 87585 A                                     (3 Points)               [ ] Paralysis  (less than 1 month)                             (5 Points)  [ ] Factor V Leiden                                             (3 Points)                  [ ] Multiple Trauma within 1 month                        (5 Points)  [ ] Lupus anticoagulants                                     (3 Points)                                                           [ ] Anticardiolipin antibodies                               (3 Points)                                                       [ ] High homocysteine in the blood                      (3 Points)                                             [ ] Other congenital or acquired thrombophilia      (3 Points)                                                [ ] Heparin induced thrombocytopenia                  (3 Points)                                     Total Score [    4      ] 36 yo F  Csection x3 LMP 2021, most recent birth [birth year]2020, PMH of morbid obesity (BMI 40.8), PSH of parathyroidectomy on 2020 presents with c/o intermittent sharp and severe epigastric pain for about two months, the pain is worse with eating or drinking, especially when eating fatty foods. Patient reports that occasionally her pain is so sharp that it is painful for her to take a breath. Today she reports her pain is now radiating to her left upper back. Patient also c/o nausea, last episode of nausea was on , and of diarrhea almost every time after she eats. Presents for preop assessment prior to laparoscopic, possible open cholecystectomy w/Dr Finkelstein on     Pt was educated on preop preparation with written and verbal instructions. Pt was informed to obtain medical clearance. Pt was educated on NSAIDs, multivitamins and herbals that increase the risk of bleeding and need to be stopped 7 days before procedure. Pt was educated on covid testing and covid prevention, i.e. social distancing, handwashing, mask wearing. Pt was advised that if her pain increases significantly and makes her breathing difficult, she must go to ER. Pt verbalized understanding. [employer]  ([place], ID# [id number]) was utilized throughout the encounter.      OPIOID RISK TOOL    JUVENCIO EACH BOX THAT APPLIES AND ADD TOTALS AT THE END    FAMILY HISTORY OF SUBSTANCE ABUSE                 FEMALE         MALE                                                Alcohol                             [  ]1 pt          [  ]3pts                                               Illegal Drugs                     [  ]2 pts        [  ]3pts                                               Rx Drugs                           [  ]4 pts        [  ]4 pts    PERSONAL HISTORY OF SUBSTANCE ABUSE                                                                                          Alcohol                             [  ]3 pts       [  ]3 pts                                               Illegal Drugs                     [  ]4 pts        [  ]4 pts                                               Rx Drugs                           [  ]5 pts        [  ]5 pts    AGE BETWEEN 16-45 YEARS                                      [x ]1 pt         [  ]1 pt    HISTORY OF PREADOLESCENT   SEXUAL ABUSE                                                             [  ]3 pts        [  ]0pts    PSYCHOLOGICAL DISEASE                     ADD, OCD, Bipolar, Schizophrenia        [  ]2 pts         [  ]2 pts                      Depression                                               [  ]1 pt           [  ]1 pt           SCORING TOTAL   (add numbers and type here)              ( 1 )                                     A score of 3 or lower indicated LOW risk for future opioid abuse  A score of 4 to 7 indicated moderate risk for future opioid abuse  A score of 8 or higher indicates a high risk for opioid abuse    CAPRINI VTE 2.0 SCORE [CLOT updated 2019]    AGE RELATED RISK FACTORS                                                       MOBILITY RELATED FACTORS  [ ] Age 41-60 years                                            (1 Point)                    [ ] Bed rest                                                        (1 Point)  [ ] Age: 61-74 years                                           (2 Points)                  [ ] Plaster cast                                                   (2 Points)  [ ] Age= 75 years                                              (3 Points)                    [ ] Bed bound for more than 72 hours                 (2 Points)    DISEASE RELATED RISK FACTORS                                               GENDER SPECIFIC FACTORS  [x ] Edema in the lower extremities                       (1 Point)              [ ] Pregnancy                                                     (1 Point)  [ ] Varicose veins                                               (1 Point)                     [ ] Post-partum < 6 weeks                                   (1 Point)             [ x] BMI > 25 Kg/m2                                            (1 Point)                     [ ] Hormonal therapy  or oral contraception          (1 Point)                 [ ] Sepsis (in the previous month)                        (1 Point)               [ ] History of pregnancy complications                 (1 point)  [ ] Pneumonia or serious lung disease                                               [ ] Unexplained or recurrent                     (1 Point)           (in the previous month)                               (1 Point)  [ ] Abnormal pulmonary function test                     (1 Point)                 SURGERY RELATED RISK FACTORS  [ ] Acute myocardial infarction                              (1 Point)               [ ]  Section                                             (1 Point)  [ ] Congestive heart failure (in the previous month)  (1 Point)      [ ] Minor surgery                                                  (1 Point)   [ ] Inflammatory bowel disease                             (1 Point)               [ ] Arthroscopic surgery                                        (2 Points)  [ ] Central venous access                                      (2 Points)                [x ] General surgery lasting more than 45 minutes (2 points)  [ ] Malignancy- Present or previous                   (2 Points)                [ ] Elective arthroplasty                                         (5 points)    [ ] Stroke (in the previous month)                          (5 Points)                                                                                                                                                           HEMATOLOGY RELATED FACTORS                                                 TRAUMA RELATED RISK FACTORS  [ ] Prior episodes of VTE                                     (3 Points)                [ ] Fracture of the hip, pelvis, or leg                       (5 Points)  [ ] Positive family history for VTE                         (3 Points)             [ ] Acute spinal cord injury (in the previous month)  (5 Points)  [ ] Prothrombin 33548 A                                     (3 Points)               [ ] Paralysis  (less than 1 month)                             (5 Points)  [ ] Factor V Leiden                                             (3 Points)                  [ ] Multiple Trauma within 1 month                        (5 Points)  [ ] Lupus anticoagulants                                     (3 Points)                                                           [ ] Anticardiolipin antibodies                               (3 Points)                                                       [ ] High homocysteine in the blood                      (3 Points)                                             [ ] Other congenital or acquired thrombophilia      (3 Points)                                                [ ] Heparin induced thrombocytopenia                  (3 Points)                                     Total Score [    4      ]

## 2021-04-26 NOTE — H&P PST ADULT - LAB RESULTS AND INTERPRETATION
4/27: abnormal labs reported to surgeon Dr Finkelstein (Bonnie), all labs faxed to PCP Dr Irizarry. Yoli Avilez NP

## 2021-04-26 NOTE — H&P PST ADULT - NSICDXPASTSURGICALHX_GEN_ALL_CORE_FT
PAST SURGICAL HISTORY:  H/O parathyroidectomy 2020    S/P  , ,      PAST SURGICAL HISTORY:  H/O bilateral salpingectomy 10/23/2020    H/O parathyroidectomy 2020 due to primary hyperparathyroidism    S/P  , ,

## 2021-04-26 NOTE — H&P PST ADULT - GASTROINTESTINAL DETAILS
soft/no distention/no masses palpable/bowel sounds normal/no bruit/no organomegaly soft/no distention/no masses palpable/bowel sounds normal/no bruit/no rigidity/no organomegaly

## 2021-04-26 NOTE — ASU PATIENT PROFILE, ADULT - LEARNING ASSESSMENT (PATIENT) ADDITIONAL COMMENTS
NP, instructed pt on pre-op instructions/teaching, tips for safer surgery, pain management scale, pre-surgical infection prevention instructions, covid swab done at PST 4/26/21 speciman sent to lab to be processed. Pt verbalized understanding of all instructions given.

## 2021-04-26 NOTE — H&P PST ADULT - OPHTHALMOLOGIC COMMENTS
Patient:   HELEN RODRIGUEZ            MRN: [id number]            FIN: [id number]               Age:   89 years     Sex:  FEMALE     :  26   Associated Diagnoses:   Fall; Trauma - major   Author:   YOSELYN CELESTE      Discharge Information   Discharge Summary Information:  Admitted  2017, Discharged  2017.         Admitting physician: SILVIA, GLORY DEL ROSARIO.         Admitting diagnosis: Complaint of Fall (PNED [id number], Reason For Visit, Medical), Complaint of Trauma - major (PNED [id number], Reason For Visit, Medical).       Physical Examination   VS/Measurements        Vitals between:   2017 14:07:00   TO   2017 14:07:00                   LAST RESULT MINIMUM MAXIMUM  Temperature 36.8 36.6 36.8  Heart Rate 82 71 88  Respiratory Rate 14 12 26  NISBP           105 105 166  NIDBP           59 28 70  NIMBP           69 69 101  SpO2                    97 95 99        Hospital Course   Hospital Course   Admitted from: from emergency department.     Transferred via: by ambulance.     Admission disposition: admit to ICU.     Medical management: 89 year old female with fall from standing, striking left side, found to have left sided rib fractures and pneumothorax, status post chest tube placement at OSH and was transferred here.  Shortly after arrival here her CT fell out.  Repeat scans at that time showed her lung had reexpanded and thus another tube was not placed.  Imaging showed multiple L rib fractures and flail chest.  Patient was monitored in the STIC for worsening respiratory status but pt improved on 0-6L NC and improved to 1200cc on IS with regular use.  Rib plating was presented to the patient and her family as an option to stabilize the chest wall to which they declined.  During this visit, patient wished to be made DNR in the case of future need and the proper paperwork was completed.  Patient was made floor  status and while waiting for a bed patient received DANDY placement and was sent to [facility] facility.   .        Discharge Plan   Discharge Summary Plan   Discharge Status: stable.     Discharge instructions given: to patient.     Discharge disposition: discharge to skilled nursing facility [facility].     Prescriptions: continue same medications, reviewed with patient, written and given to patient.     Education and Follow-up   Counseled: patient, family.     Discharge Planning: Hearing Loss, Deep Vein Thrombosis, Atrial Fibrillation, Easy-to-Read, Age-Related Macular Degeneration, Pulmonary Embolism, High Cholesterol, Hypothyroidism, Fall Prevention and Home Safety, Trauma Clinic [facility] 2 weeks.              Electronically Signed On 11/17/2017 15:47  __________________________________________________   YOSELYN CELESTE      Electronically Signed On 11/27/2017 03:13  __________________________________________________   KOSTA BERGER     wears corrective lenses for distance

## 2021-04-26 NOTE — H&P PST ADULT - NSICDXPASTMEDICALHX_GEN_ALL_CORE_FT
PAST MEDICAL HISTORY:  Asthma, unspecified asthma severity, unspecified whether complicated, unspecified whether persistent     Calculus of gallbladder without cholecystitis without obstruction     COVID-19 3/2020    Missed ab 2015     PAST MEDICAL HISTORY:  Asthma, unspecified asthma severity, unspecified whether complicated, unspecified whether persistent     Calculus of gallbladder without cholecystitis without obstruction     COVID-19 3/2020    H/O hypoparathyroidism     Missed ab 2015

## 2021-04-26 NOTE — H&P PST ADULT - MALLAMPATI CLASS
PAST SURGICAL HISTORY:  Artificial pacemaker defib    S/P tonsillectomy Class III - visualization of the soft palate and the base of the uvula

## 2021-04-26 NOTE — H&P PST ADULT - NSICDXPROBLEM_GEN_ALL_CORE_FT
PROBLEM DIAGNOSES  Problem: Need for prophylactic measure  Assessment and Plan: caprini score 4, moderate risk for dvt, SCD ordered, surgical team to assess for dvt prophylaxis        PROBLEM DIAGNOSES  Problem: Calculus of gallbladder without cholecystitis without obstruction  Assessment and Plan: preop assessment, medical clearance pending, laparoscopic cholecystectomy, possible open on 4/29    Problem: Need for prophylactic measure  Assessment and Plan: caprini score 4, moderate risk for dvt, SCD ordered, surgical team to assess for dvt prophylaxis     Problem: Screening for substance abuse  Assessment and Plan: ort score 1, low risk for substance abuse

## 2021-04-26 NOTE — H&P PST ADULT - HISTORY OF PRESENT ILLNESS
34 yo F  Csection x1 LMP 2021 PSH of parathyroidectomy on 2020 presents for preop assessment prior to laparoscopic, possible open cholecystectomy w/Dr Finkelstein on  34 yo F  Csection x3 LMP 2021, most recent birth 10/23/2020, PMH of morbid obesity (BMI 40.8), PSH of parathyroidectomy on 2020 presents with c/o intermittent severe pain worse with eating or drinking, especially when eating fatty food. She also c/o nausea, last episode , also of diarrhea almost every time after she eats. Presents for preop assessment prior to laparoscopic, possible open cholecystectomy w/Dr Finkelstein on  34 yo F  Csection x3 LMP 2021, most recent birth 10/23/2020, PMH of morbid obesity (BMI 40.8), PSH of parathyroidectomy on 2020 presents with c/o intermittent sharp and severe epigastric pain for about two months, the pain is worse with eating or drinking, especially when eating fatty foods. Sometimes her pain is so sharp that it pis painful for her to take a breath. She reports her pain is radiating to her left back. Patient also c/o nausea, last episode of nausea was on , and of diarrhea almost every time after she eats. Presents for preop assessment prior to laparoscopic, possible open cholecystectomy w/Dr Finkelstein on  36 yo F  Csection x3 LMP 2021, most recent birth 10/23/2020, PMH of morbid obesity (BMI 40.8), PSH of parathyroidectomy on 2020 presents with c/o intermittent sharp and severe epigastric pain for about two months, the pain is worse with eating or drinking, especially when eating fatty foods. Sometimes her pain is so sharp that it is painful for her to take a breath. Today she reports her pain is now radiating to her left back. Patient also c/o nausea, last episode of nausea was on , and of diarrhea almost every time after she eats. Presents for preop assessment prior to laparoscopic, possible open cholecystectomy w/Dr Finkelstein on  36 yo F  Csection x3 LMP 2021, most recent birth 10/23/2020, PMH of morbid obesity (BMI 40.8), PSH of parathyroidectomy on 2020 presents with c/o intermittent sharp and severe epigastric pain for about two months, the pain is worse with eating or drinking, especially when eating fatty foods. Patient reports that occasionally her pain is so sharp that it is painful for her to take a breath. Today she reports her pain is now radiating to her left upper back. Patient also c/o nausea, last episode of nausea was on , and of diarrhea almost every time after she eats. Presents for preop assessment prior to laparoscopic, possible open cholecystectomy w/Dr Finkelstein on

## 2021-04-27 DIAGNOSIS — Z90.79 ACQUIRED ABSENCE OF OTHER GENITAL ORGAN(S): Chronic | ICD-10-CM

## 2021-04-27 LAB
CULTURE RESULTS: SIGNIFICANT CHANGE UP
SPECIMEN SOURCE: SIGNIFICANT CHANGE UP

## 2021-04-28 ENCOUNTER — EMERGENCY (EMERGENCY)
Facility: HOSPITAL | Age: 36
LOS: 1 days | Discharge: HOME | End: 2021-04-28
Attending: EMERGENCY MEDICINE
Payer: COMMERCIAL

## 2021-04-28 VITALS
SYSTOLIC BLOOD PRESSURE: 141 MMHG | TEMPERATURE: 98 F | DIASTOLIC BLOOD PRESSURE: 92 MMHG | OXYGEN SATURATION: 100 % | RESPIRATION RATE: 19 BRPM | WEIGHT: 244.93 LBS | HEART RATE: 77 BPM | HEIGHT: 65 IN

## 2021-04-28 DIAGNOSIS — Z90.79 ACQUIRED ABSENCE OF OTHER GENITAL ORGAN(S): Chronic | ICD-10-CM

## 2021-04-28 DIAGNOSIS — Z98.89 OTHER SPECIFIED POSTPROCEDURAL STATES: Chronic | ICD-10-CM

## 2021-04-28 DIAGNOSIS — E89.2 POSTPROCEDURAL HYPOPARATHYROIDISM: Chronic | ICD-10-CM

## 2021-04-28 PROBLEM — U07.1 COVID-19: Chronic | Status: ACTIVE | Noted: 2020-10-21

## 2021-04-28 PROBLEM — Z86.39 PERSONAL HISTORY OF OTHER ENDOCRINE, NUTRITIONAL AND METABOLIC DISEASE: Chronic | Status: ACTIVE | Noted: 2021-04-27

## 2021-04-28 PROBLEM — K80.20 CALCULUS OF GALLBLADDER WITHOUT CHOLECYSTITIS WITHOUT OBSTRUCTION: Chronic | Status: ACTIVE | Noted: 2021-04-26

## 2021-04-28 LAB
ALBUMIN SERPL ELPH-MCNC: 4.1 G/DL — SIGNIFICANT CHANGE UP (ref 3.3–5.2)
ALP SERPL-CCNC: 112 U/L — SIGNIFICANT CHANGE UP (ref 40–120)
ALT FLD-CCNC: 15 U/L — SIGNIFICANT CHANGE UP
ANION GAP SERPL CALC-SCNC: 13 MMOL/L — SIGNIFICANT CHANGE UP (ref 5–17)
APPEARANCE UR: CLEAR — SIGNIFICANT CHANGE UP
APTT BLD: 39.1 SEC — HIGH (ref 27.5–35.5)
AST SERPL-CCNC: 17 U/L — SIGNIFICANT CHANGE UP
BASOPHILS # BLD AUTO: 0.04 K/UL — SIGNIFICANT CHANGE UP (ref 0–0.2)
BASOPHILS NFR BLD AUTO: 0.4 % — SIGNIFICANT CHANGE UP (ref 0–2)
BILIRUB SERPL-MCNC: <0.2 MG/DL — LOW (ref 0.4–2)
BILIRUB UR-MCNC: NEGATIVE — SIGNIFICANT CHANGE UP
BUN SERPL-MCNC: 11 MG/DL — SIGNIFICANT CHANGE UP (ref 8–20)
CALCIUM SERPL-MCNC: 9.4 MG/DL — SIGNIFICANT CHANGE UP (ref 8.6–10.2)
CHLORIDE SERPL-SCNC: 103 MMOL/L — SIGNIFICANT CHANGE UP (ref 98–107)
CO2 SERPL-SCNC: 24 MMOL/L — SIGNIFICANT CHANGE UP (ref 22–29)
COLOR SPEC: YELLOW — SIGNIFICANT CHANGE UP
CREAT SERPL-MCNC: 0.49 MG/DL — LOW (ref 0.5–1.3)
DIFF PNL FLD: NEGATIVE — SIGNIFICANT CHANGE UP
EOSINOPHIL # BLD AUTO: 0.14 K/UL — SIGNIFICANT CHANGE UP (ref 0–0.5)
EOSINOPHIL NFR BLD AUTO: 1.3 % — SIGNIFICANT CHANGE UP (ref 0–6)
GLUCOSE SERPL-MCNC: 88 MG/DL — SIGNIFICANT CHANGE UP (ref 70–99)
GLUCOSE UR QL: NEGATIVE MG/DL — SIGNIFICANT CHANGE UP
HCG SERPL-ACNC: <4 MIU/ML — SIGNIFICANT CHANGE UP
HCT VFR BLD CALC: 38.3 % — SIGNIFICANT CHANGE UP (ref 34.5–45)
HGB BLD-MCNC: 11.5 G/DL — SIGNIFICANT CHANGE UP (ref 11.5–15.5)
IMM GRANULOCYTES NFR BLD AUTO: 0.3 % — SIGNIFICANT CHANGE UP (ref 0–1.5)
INR BLD: 1.25 RATIO — HIGH (ref 0.88–1.16)
KETONES UR-MCNC: NEGATIVE — SIGNIFICANT CHANGE UP
LACTATE BLDV-MCNC: 1.3 MMOL/L — SIGNIFICANT CHANGE UP (ref 0.5–2)
LEUKOCYTE ESTERASE UR-ACNC: NEGATIVE — SIGNIFICANT CHANGE UP
LYMPHOCYTES # BLD AUTO: 2.73 K/UL — SIGNIFICANT CHANGE UP (ref 1–3.3)
LYMPHOCYTES # BLD AUTO: 26 % — SIGNIFICANT CHANGE UP (ref 13–44)
MCHC RBC-ENTMCNC: 22.8 PG — LOW (ref 27–34)
MCHC RBC-ENTMCNC: 30 GM/DL — LOW (ref 32–36)
MCV RBC AUTO: 76 FL — LOW (ref 80–100)
MONOCYTES # BLD AUTO: 0.56 K/UL — SIGNIFICANT CHANGE UP (ref 0–0.9)
MONOCYTES NFR BLD AUTO: 5.3 % — SIGNIFICANT CHANGE UP (ref 2–14)
NEUTROPHILS # BLD AUTO: 7.01 K/UL — SIGNIFICANT CHANGE UP (ref 1.8–7.4)
NEUTROPHILS NFR BLD AUTO: 66.7 % — SIGNIFICANT CHANGE UP (ref 43–77)
NITRITE UR-MCNC: NEGATIVE — SIGNIFICANT CHANGE UP
PH UR: 6.5 — SIGNIFICANT CHANGE UP (ref 5–8)
PLATELET # BLD AUTO: 369 K/UL — SIGNIFICANT CHANGE UP (ref 150–400)
POTASSIUM SERPL-MCNC: 3.9 MMOL/L — SIGNIFICANT CHANGE UP (ref 3.5–5.3)
POTASSIUM SERPL-SCNC: 3.9 MMOL/L — SIGNIFICANT CHANGE UP (ref 3.5–5.3)
PROT SERPL-MCNC: 7.6 G/DL — SIGNIFICANT CHANGE UP (ref 6.6–8.7)
PROT UR-MCNC: NEGATIVE MG/DL — SIGNIFICANT CHANGE UP
PROTHROM AB SERPL-ACNC: 14.3 SEC — HIGH (ref 10.6–13.6)
RBC # BLD: 5.04 M/UL — SIGNIFICANT CHANGE UP (ref 3.8–5.2)
RBC # FLD: 16.4 % — HIGH (ref 10.3–14.5)
SARS-COV-2 RNA SPEC QL NAA+PROBE: SIGNIFICANT CHANGE UP
SODIUM SERPL-SCNC: 139 MMOL/L — SIGNIFICANT CHANGE UP (ref 135–145)
SP GR SPEC: 1.01 — SIGNIFICANT CHANGE UP (ref 1.01–1.02)
UROBILINOGEN FLD QL: NEGATIVE MG/DL — SIGNIFICANT CHANGE UP
WBC # BLD: 10.51 K/UL — HIGH (ref 3.8–10.5)
WBC # FLD AUTO: 10.51 K/UL — HIGH (ref 3.8–10.5)

## 2021-04-28 PROCEDURE — 99243 OFF/OP CNSLTJ NEW/EST LOW 30: CPT

## 2021-04-28 PROCEDURE — U0005: CPT

## 2021-04-28 PROCEDURE — 99285 EMERGENCY DEPT VISIT HI MDM: CPT

## 2021-04-28 PROCEDURE — 85730 THROMBOPLASTIN TIME PARTIAL: CPT

## 2021-04-28 PROCEDURE — 87086 URINE CULTURE/COLONY COUNT: CPT

## 2021-04-28 PROCEDURE — 83605 ASSAY OF LACTIC ACID: CPT

## 2021-04-28 PROCEDURE — 85610 PROTHROMBIN TIME: CPT

## 2021-04-28 PROCEDURE — 84480 ASSAY TRIIODOTHYRONINE (T3): CPT

## 2021-04-28 PROCEDURE — 36415 COLL VENOUS BLD VENIPUNCTURE: CPT

## 2021-04-28 PROCEDURE — 84439 ASSAY OF FREE THYROXINE: CPT

## 2021-04-28 PROCEDURE — 99283 EMERGENCY DEPT VISIT LOW MDM: CPT

## 2021-04-28 PROCEDURE — 93005 ELECTROCARDIOGRAM TRACING: CPT

## 2021-04-28 PROCEDURE — U0003: CPT

## 2021-04-28 PROCEDURE — 80053 COMPREHEN METABOLIC PANEL: CPT

## 2021-04-28 PROCEDURE — 93010 ELECTROCARDIOGRAM REPORT: CPT

## 2021-04-28 PROCEDURE — 99284 EMERGENCY DEPT VISIT MOD MDM: CPT

## 2021-04-28 PROCEDURE — 85025 COMPLETE CBC W/AUTO DIFF WBC: CPT

## 2021-04-28 PROCEDURE — 84702 CHORIONIC GONADOTROPIN TEST: CPT

## 2021-04-28 PROCEDURE — 81003 URINALYSIS AUTO W/O SCOPE: CPT

## 2021-04-28 PROCEDURE — 84436 ASSAY OF TOTAL THYROXINE: CPT

## 2021-04-28 PROCEDURE — 84443 ASSAY THYROID STIM HORMONE: CPT

## 2021-04-28 RX ORDER — FLUCONAZOLE 150 MG/1
150 TABLET ORAL ONCE
Refills: 0 | Status: COMPLETED | OUTPATIENT
Start: 2021-04-28 | End: 2021-04-28

## 2021-04-28 RX ORDER — ACETAMINOPHEN 500 MG
975 TABLET ORAL ONCE
Refills: 0 | Status: COMPLETED | OUTPATIENT
Start: 2021-04-28 | End: 2021-04-28

## 2021-04-28 RX ADMIN — Medication 975 MILLIGRAM(S): at 15:03

## 2021-04-28 RX ADMIN — Medication 975 MILLIGRAM(S): at 16:53

## 2021-04-28 RX ADMIN — FLUCONAZOLE 150 MILLIGRAM(S): 150 TABLET ORAL at 18:01

## 2021-04-28 NOTE — ED STATDOCS - PHYSICAL EXAMINATION
Gen: No acute distress, non toxic  HEENT: Mucous membranes moist, pink conjunctivae, EOMI  CV: RRR, nl s1/s2.  Resp: CTAB, normal rate and effort  GI: Abdomen soft, mild generalized tenderness, more on right side, ND. No rebound, no guarding  : No CVAT  Neuro: A&O x 3, moving all 4 extremities  MSK: No spine or joint tenderness to palpation  Skin: No rashes. intact and perfused.

## 2021-04-28 NOTE — CONSULT NOTE ADULT - ASSESSMENT
35y  Female symptomatic cholelithiasis, GERD, history of parathyroidectomy (x3). Patient presents after PST results showing elevated leukocytosis to 12 and UA showing budding yeasts. Patient reports she's been having RUQ pain for a few months that radiates to her back. She states the pain is associated w/ meals which is prohibiting her from eating too much. She also admits to dysuria and malodorous urine along with some tingling in her hands. She otherwise denies f/c/n/v/cp/sob. She is scheduled for an elective cholecystectomy 4/29.       Concern for UTI  Vaginal Yeast infection      - Likely vaginal yeast infection  - Give Fluconazole 150mg PO x 1 dose  - UA in the ER with no pyuria to suggest UTI  - No need for further urine testing unless has urinary symptoms   - No ID contraindication for cholecystectomy       Will Sign off. Please call PRN.       D/W Gage GARCIA  
35yoF w symptomatic cholelithiasis. On PST, concerned for leukocytosis, elevated coags, and budding yeast in UTI. In the ED, WBC cleared.  - needs medical clearance (med called by ED)  - ID called by ED for UTI 2/2 yeast  - repeat coags  - If cleared by medicine for lap, possible open, jn, then can be d/c and return on 4/29 for OR    discussed w/ Dr. Almendarez
35y  Female   GERD, history of parathyroidectomy (x3), symptomatic cholelithiasis, patient has gall bladder issues for few months and is mostly associated with meals, she is scheduled for elective cholecystectomy 4/29 she was sent to the ED from presurgical testing for elevated white count, her wbcs were12 and UA showing budding yeasts, , as per her she has some urinary symptoms as well like dysuria and malodorous urine along she denies chest pain, sob, dizziness, fever, chills, chest pain, nausea, vomiting, her blood work in the ED done and was seen by ID in the ED recommended 1 dose of oral fluconazole.  her labs in the ED showed WBC count better then before, Thyroid function panel is ok, her pregnancy test is negative, UA negative for wbcs, nitrite and esterases, patient is has no Hx of CHF, stroke, CKD, she denies any chest pain, orthopnea or exertional dyspnea, no personal or family hx of bleeding, no hx of allergies to anesthesia medications, her EKG showed NSR, no st and t waves changes, she is at low to intermediate risk for ethan portative cardiovascular complications and  patient is optimized from the medicine point of view for planned procedure.   would recommend Duoneb treatment before and after surgical intervention.     Thank you for involving hospitalist team for patient care.

## 2021-04-28 NOTE — CONSULT NOTE ADULT - SUBJECTIVE AND OBJECTIVE BOX
ACUTE CARE SURGERY CONSULT    Consulting surgical team: [facility] - Acute Care Surgery  Consulting attending: Dr. Almendarez  Patient seen and examined: 21 @ 15:38    HPI:  Patient is a 35y Female with symptomatic cholelithiasis, GERD, history of parathryoidectomy (x3) who presents after PST results showing elevated leukocytosis to 12 and UA showing budding yeasts. Patient reports she's been having RUQ pain for a few weeks that radiates to her back. She states the pain is associated w/ meals which is prohibiting her from eating too much. She also admits to dysuria and malodorous urine along with some tingling in her hands. She otherwise denies f/c/n/v/cp/sob.    PAST MEDICAL HISTORY:  No pertinent past medical history    Asthma, unspecified asthma severity, unspecified whether complicated, unspecified whether persistent    COVID-19    Missed ab    Calculus of gallbladder without cholecystitis without obstruction    H/O hypoparathyroidism        PAST SURGICAL HISTORY:  S/P     H/O parathyroidectomy    H/O bilateral salpingectomy        ALLERGIES:  No Known Allergies      MEDICATIONS  (STANDING):    MEDICATIONS  (PRN):      VITALS & I/Os:  Vital Signs Last 24 Hrs  T(C): 36.8 (2021 13:28), Max: 36.8 (2021 13:28)  T(F): 98.2 (2021 13:28), Max: 98.2 (2021 13:28)  HR: 77 (2021 13:28) (77 - 77)  BP: 141/92 (2021 13:28) (141/92 - 141/92)  BP(mean): --  RR: 19 (2021 13:28) (19 - 19)  SpO2: 100% (2021 13:28) (100% - 100%)  CAPILLARY BLOOD GLUCOSE          I&O's Summary        GEN: NAD, alert and oriented x 3  HEENT: WNL  CHEST: Symmetrical chest rise, breath sounds CTAB  HEART: RRR, non-muffled heart sounds  ABD: Soft, non-tender, mildly tender in the RUQ, NEG cordova's. moribdly obese  EXT/VASC:  no c/c/e    LABS:                        11.5   10.51 )-----------( 369      ( 2021 15:07 )             38.3         139  |  103  |  11.0  ----------------------------<  88  3.9   |  24.0  |  0.49<L>    Ca    9.4      2021 15:07  Mg     2.1         TPro  7.6  /  Alb  4.1  /  TBili  <0.2<L>  /  DBili  x   /  AST  17  /  ALT  15  /  AlkPhos  112        PT/INR - ( 2021 18:52 )   PT: 14.5 sec;   INR: 1.26 ratio         PTT - ( 2021 18:52 )  PTT:39.5 sec         @ 15:05  1.3    Urinalysis Basic - ( 2021 15:10 )    Color: Yellow / Appearance: Clear / S.010 / pH: x  Gluc: x / Ketone: Negative  / Bili: Negative / Urobili: Negative mg/dL   Blood: x / Protein: Negative mg/dL / Nitrite: Negative   Leuk Esterase: Negative / RBC: x / WBC x   Sq Epi: x / Non Sq Epi: x / Bacteria: x        IMAGING:

## 2021-04-28 NOTE — ED STATDOCS - PSH
H/O bilateral salpingectomy  10/23/2020  H/O parathyroidectomy  2020 due to primary hyperparathyroidism  S/P   , ,

## 2021-04-28 NOTE — ED STATDOCS - CLINICAL SUMMARY MEDICAL DECISION MAKING FREE TEXT BOX
Pt sent in for pre-op budding yeast and white count of 12, will check urine and CBC , ID consulted for med clearance per surgery request, surgery aware and following, dispo to surgery, labs and surgery/ID. Pt sent in for pre-op budding yeast and white count of 12, scheduled for cholecystectomy tomorrow, will check urine and CBC, ID consulted for med clearance per surgery request, surgery aware and following, dispo to surgery, labs and surgery/ID.

## 2021-04-28 NOTE — ED STATDOCS - PROGRESS NOTE DETAILS
labs, UA and EKG reviewed no acute findings. Fluconazole 150mg one time dose given for yeast infection. Pt cleared by ID Dr Paul and hospitalist Dr Mccracken for surgery tomorrow. Dr Mccracken recommends Duoneb treatments before and after surgical intervention. Surgery called and spoke with ED RN and wants pt to return tomorrow for 8am NPO at midnight. Discussed with pt and pt verbalized understanding.

## 2021-04-28 NOTE — ED ADULT NURSE NOTE - HIV OFFER
Patient is scheduled for his AWV on 09/03/2021 and would like his labs to be entered with [facility].
Previously Declined (within the last year)

## 2021-04-28 NOTE — CONSULT NOTE ADULT - SUBJECTIVE AND OBJECTIVE BOX
35y  Female   GERD, history of parathyroidectomy (x3), symptomatic cholelithiasis, patient has gall bladder issues for few months and is mostly associated with meals, she is scheduled for elective cholecystectomy  she was sent to the ED from presurgical testing for elevated white count, her wbcs were12 and UA showing budding yeasts, , as per her she has some urinary symptoms as well like dysuria and malodorous urine along she denies chest pain, sob, dizziness, fever, chills, chest pain, nausea, vomiting, seen by ID in the ED         Past Medical & Surgical Hx:  Asthma, unspecified asthma severity, unspecified whether complicated, unspecified whether persistent  COVID-19  3/2020  Missed ab   Calculus of gallbladder without cholecystitis without obstruction  H/O hypoparathyroidism  S/P  , ,   H/O parathyroidectomy 2020 due to primary hyperparathyroidism  H/O bilateral salpingectomy 10/23/2020      Social Hx:  Denies smoking       FAMILY HISTORY:  No family history of Asthma in mother or father       Allergies  No Known Allergies     35y  Female  asthma, GERD, history of parathyroidectomy (x3), symptomatic cholelithiasis, patient has gall bladder issues for few months and is mostly associated with meals, she is scheduled for elective cholecystectomy  she was sent to the ED from presurgical testing for elevated white count, her wbcs were12 and UA showing budding yeasts, , as per her she has some urinary symptoms as well like dysuria and malodorous urine along she denies chest pain, sob, dizziness, fever, chills, chest pain, nausea, vomiting, her blood work in the ED done and was seen by ID in the ED recommended 1 dose of oral fluconazole.         Past Medical & Surgical Hx:  Asthma, unspecified asthma severity, unspecified whether complicated, unspecified whether persistent  COVID-19  3/2020  Missed ab   Calculus of gallbladder without cholecystitis without obstruction  H/O hypoparathyroidism  S/P  , ,   H/O parathyroidectomy 2020 due to primary hyperparathyroidism  H/O bilateral salpingectomy 10/23/2020      Social Hx:  Denies smoking, drinking or using any drugs       FAMILY HISTORY:  No family history of Asthma in mother or father   no family hx of excessive bleeding      Allergies  No Known Allergies    Medication Hx: she uses albuterol inhaler PRN.     Vital Signs Last 24 Hrs  T(C): 36.8 (2021 13:28), Max: 36.8 (2021 13:28)  T(F): 98.2 (2021 13:28), Max: 98.2 (2021 13:28)  HR: 77 (2021 13:28) (77 - 77)  BP: 141/92 (2021 13:28) (141/92 - 141/92)  RR: 19 (2021 13:28) (19 - 19)  SpO2: 100% (2021 13:28) (100% - 100%)    PHYSICAL EXAM:    GENERAL: Young obese female looking comfortable   HEENT: PERRL, +EOMI  NECK: soft, Supple, No JVD,   CHEST/LUNG: Clear to auscultate bilaterally; No wheezing  HEART: S1S2+, Regular rate and rhythm; No murmurs  ABDOMEN: Soft, has epigastric tender, Nondistended; Bowel sounds present  EXTREMITIES:  2+ Peripheral Pulses, No edema  SKIN: No rashes or lesions  NEURO: AAOX3, no focal deficits, no motor r sensory loss  PSYCH: normal mood    Labs reviewed.

## 2021-04-28 NOTE — ED STATDOCS - ATTENDING CONTRIBUTION TO CARE
Ronald: I performed a face to face bedside interview with patient regarding history of present illness, review of symptoms and past medical history. I completed an independent physical exam and ordered tests/medications as needed.  I have discussed patient's plan of care with advanced care provider. The advanced care provider assisted in  executing the discussed plan. I was available for any questions or issues that may have arose during the execution of the plan of care.

## 2021-04-28 NOTE — ED STATDOCS - OBJECTIVE STATEMENT
34 y/o F with PMHx of asthma, calculus of gallbladder, hypoparathyroidism and missed , , sent to ED by Dr. Almendarez from surgery because pre-op showed budding yeast in urine with white count of 12, sent for medical clearance for cholecystectomy tomorrow. Per surgery team resident Fior, requesting medicine admission for workup. Pt states dysuria for 2 weeks, generalized abdominal pain, chronic. No fever, chills. No other complaints.

## 2021-04-28 NOTE — ED STATDOCS - NS ED ROS FT
ROS: No fever/chills. No eye pain/changes in vision, No ear pain/sore throat/dysphagia, No chest pain/palpitations. No SOB/cough/. (+) abdominal pain, no N/V/D, no black/bloody bm. (+) dysuria, no frequency/discharge, No headache. No Dizziness.    No rashes or breaks in skin. No numbness/tingling/weakness.

## 2021-04-28 NOTE — ED ADULT NURSE NOTE - NSIMPLEMENTINTERV_GEN_ALL_ED
Implemented All Universal Safety Interventions:  [place] to call system. Call bell, personal items and telephone within reach. Instruct patient to call for assistance. Room bathroom lighting operational. Non-slip footwear when patient is off stretcher. Physically safe environment: no spills, clutter or unnecessary equipment. Stretcher in lowest position, wheels locked, appropriate side rails in place.

## 2021-04-28 NOTE — ED ADULT TRIAGE NOTE - CHIEF COMPLAINT QUOTE
Pt. sent in by MD for further blood work for abnormal result(unknown by pt).  Pt. scheduled to get gallbladder removed tomorrow.  Pt. complaining of abdominal pain on arrival to ED

## 2021-04-28 NOTE — ED STATDOCS - PATIENT PORTAL LINK FT
You can access the FollowMyHealth Patient Portal offered by [facility] by registering at the following website: [email or web address]. By joining [employer]’s FollowMyHealth portal, you will also be able to view your health information using other applications (apps) compatible with our system.

## 2021-04-28 NOTE — ED ADULT NURSE REASSESSMENT NOTE - NS ED NURSE REASSESS COMMENT FT1
pt awaiting medical clearance. per surgery, if cleared by medicine, pt can go home and return for surgery tomorrow.

## 2021-04-28 NOTE — ED STATDOCS - PMH
Asthma, unspecified asthma severity, unspecified whether complicated, unspecified whether persistent    Calculus of gallbladder without cholecystitis without obstruction    COVID-19  3/2020  H/O hypoparathyroidism    Missed ab  2015

## 2021-04-28 NOTE — ED STATDOCS - NSFOLLOWUPINSTRUCTIONS_ED_ALL_ED_FT
Do not eat or drink after midnight.     Return to ED at 8am tomorrow morning on 04/29/2021 for gallbladder removal.     Do not take any blood thinners including Motrin, Aspirin, Aleve      No coma ni roland después de medianoche.     Regreso a ED a las 8 de la mañana de mañana en 04/29/2021 para la extracción de vesícula biliar.     No tome anticoagulantes joseluis Motrin, Aspirina, Aleve

## 2021-04-28 NOTE — CONSULT NOTE ADULT - SUBJECTIVE AND OBJECTIVE BOX
[facility]  INFECTIOUS DISEASES AND INTERNAL MEDICINE at [place]  =======================================================  Efra Steel MD  Diplomates American Board of Internal Medicine and Infectious Diseases  Tel: [phone]      Fax: [phone]  =======================================================      [id number]  JOSE RAFAEL PATELVIOLETTA    CC: Patient is a 35y old  Female who presents with a chief complaint of concern for UTI    35y  Female symptomatic cholelithiasis, GERD, history of parathyroidectomy (x3). Patient presents after PST results showing elevated leukocytosis to 12 and UA showing budding yeasts. Patient reports she's been having RUQ pain for a few months that radiates to her back. She states the pain is associated w/ meals which is prohibiting her from eating too much. She also admits to dysuria and malodorous urine along with some tingling in her hands. She otherwise denies f/c/n/v/cp/sob. She is scheduled for an elective cholecystectomy . ID input requested.       Past Medical & Surgical Hx:  Asthma, unspecified asthma severity, unspecified whether complicated, unspecified whether persistent  COVID-19  3/2020  Missed ab 2015  Calculus of gallbladder without cholecystitis without obstruction  H/O hypoparathyroidism  S/P  , ,   H/O parathyroidectomy 2020 due to primary hyperparathyroidism  H/O bilateral salpingectomy 10/23/2020      Social Hx:  Denies smoking       FAMILY HISTORY:  No family history of Asthma in mother or father       Allergies  No Known Allergies       REVIEW OF SYSTEMS:  CONSTITUTIONAL:  No Fever or chills  HEENT:  No diplopia or blurred vision.  No earache, sore throat or runny nose.  CARDIOVASCULAR:  No pressure, squeezing, strangling, tightness, heaviness or aching about the chest, neck, axilla or epigastrium.  RESPIRATORY:  No cough, shortness of breath  GASTROINTESTINAL:  No nausea, vomiting or diarrhea. + Abdominal discomfort   GENITOURINARY:  No dysuria, frequency or urgency.   MUSCULOSKELETAL:  no joint aches, no muscle pain  SKIN:  No change in skin, hair or nails.  NEUROLOGIC:  No Headaches, seizures   PSYCHIATRIC:  No disorder of thought or mood.  ENDOCRINE:  No heat or cold intolerance  HEMATOLOGICAL:  No easy bruising or bleeding.       Physical Exam:  GEN: NAD, pleasant  HEENT: normocephalic and atraumatic. EOMI. PERRL.  Anicteric  NECK: Supple.   LUNGS: Clear to auscultation.  HEART: Regular rate and rhythm   ABDOMEN: Soft, nontender, and nondistended.  Positive bowel sounds.    : No CVA tenderness  EXTREMITIES: Without any edema.  MSK: No joint swelling  NEUROLOGIC: No Focal Deficits  PSYCHIATRIC: Appropriate affect .  SKIN: No Rash      Height (cm): 165.1 ( @ 13:28)  Weight (kg): 111.1 ( @ 13:28)  BMI (kg/m2): 40.8 ( @ 13:28)  BSA (m2): 2.16 ( @ 13:28)      Vitals:  T(F): 98.2 (2021 13:28), Max: 98.2 (2021 13:28)  HR: 77 (2021 13:28)  BP: 141/92 (2021 13:28)  RR: 19 (2021 13:28)  SpO2: 100% (2021 13:28) (100% - 100%)  temp max in last 48H T(F): , Max: 98.2 (-21 @ 13:28)                          11.5   10.51 )-----------( 369      ( 2021 15:07 )             38.3         139  |  103  |  11.0  ----------------------------<  88  3.9   |  24.0  |  0.49<L>    Ca    9.4      2021 15:07  Mg     2.1         TPro  7.6  /  Alb  4.1  /  TBili  <0.2<L>  /  DBili  x   /  AST  17  /  ALT  15  /  AlkPhos  112      RECENT CULTURES:   @ 00:42 .Urine Clean Catch (Midstream)     <10,000 CFU/mL Normal Urogenital Radha      WBC Count: 10.51 K/uL (21 @ 15:07)  WBC Count: 12.68 K/uL (21 @ 18:52)    Creatinine, Serum: 0.49 mg/dL (21 @ 15:07)  Creatinine, Serum: 0.59 mg/dL (21 @ 18:52)    COVID-19 PCR: NotDetec (21 @ 18:59)    Urinalysis Basic - ( 2021 15:10 )    Color: Yellow / Appearance: Clear / S.010 / pH: x  Gluc: x / Ketone: Negative  / Bili: Negative / Urobili: Negative mg/dL   Blood: x / Protein: Negative mg/dL / Nitrite: Negative   Leuk Esterase: Negative / RBC: x / WBC x   Sq Epi: x / Non Sq Epi: x / Bacteria: x

## 2021-04-29 ENCOUNTER — OUTPATIENT (OUTPATIENT)
Dept: INPATIENT UNIT | Facility: HOSPITAL | Age: 36
LOS: 1 days | End: 2021-04-29
Payer: COMMERCIAL

## 2021-04-29 ENCOUNTER — RESULT REVIEW (OUTPATIENT)
Age: 36
End: 2021-04-29

## 2021-04-29 VITALS
HEART RATE: 72 BPM | HEIGHT: 65 IN | TEMPERATURE: 98 F | OXYGEN SATURATION: 100 % | SYSTOLIC BLOOD PRESSURE: 122 MMHG | DIASTOLIC BLOOD PRESSURE: 65 MMHG | WEIGHT: 245.37 LBS | RESPIRATION RATE: 15 BRPM

## 2021-04-29 VITALS
DIASTOLIC BLOOD PRESSURE: 70 MMHG | SYSTOLIC BLOOD PRESSURE: 123 MMHG | HEART RATE: 83 BPM | RESPIRATION RATE: 15 BRPM | OXYGEN SATURATION: 99 %

## 2021-04-29 DIAGNOSIS — Z90.79 ACQUIRED ABSENCE OF OTHER GENITAL ORGAN(S): Chronic | ICD-10-CM

## 2021-04-29 DIAGNOSIS — K80.20 CALCULUS OF GALLBLADDER WITHOUT CHOLECYSTITIS WITHOUT OBSTRUCTION: ICD-10-CM

## 2021-04-29 DIAGNOSIS — E89.2 POSTPROCEDURAL HYPOPARATHYROIDISM: Chronic | ICD-10-CM

## 2021-04-29 DIAGNOSIS — Z98.89 OTHER SPECIFIED POSTPROCEDURAL STATES: Chronic | ICD-10-CM

## 2021-04-29 LAB
CULTURE RESULTS: SIGNIFICANT CHANGE UP
SPECIMEN SOURCE: SIGNIFICANT CHANGE UP
T4 FREE SERPL-MCNC: 0.8 NG/DL — LOW (ref 0.9–1.8)

## 2021-04-29 PROCEDURE — 47562 LAPAROSCOPIC CHOLECYSTECTOMY: CPT

## 2021-04-29 PROCEDURE — 94640 AIRWAY INHALATION TREATMENT: CPT

## 2021-04-29 PROCEDURE — C1889: CPT

## 2021-04-29 PROCEDURE — 88304 TISSUE EXAM BY PATHOLOGIST: CPT

## 2021-04-29 PROCEDURE — 88304 TISSUE EXAM BY PATHOLOGIST: CPT | Mod: 26

## 2021-04-29 RX ORDER — FENTANYL CITRATE 50 UG/ML
50 INJECTION INTRAVENOUS
Refills: 0 | Status: DISCONTINUED | OUTPATIENT
Start: 2021-04-29 | End: 2021-04-29

## 2021-04-29 RX ORDER — ONDANSETRON 8 MG/1
4 TABLET, FILM COATED ORAL ONCE
Refills: 0 | Status: DISCONTINUED | OUTPATIENT
Start: 2021-04-29 | End: 2021-04-29

## 2021-04-29 RX ORDER — IPRATROPIUM/ALBUTEROL SULFATE 18-103MCG
3 AEROSOL WITH ADAPTER (GRAM) INHALATION ONCE
Refills: 0 | Status: COMPLETED | OUTPATIENT
Start: 2021-04-29 | End: 2021-04-29

## 2021-04-29 RX ORDER — ALBUTEROL 90 UG/1
0 AEROSOL, METERED ORAL
Qty: 0 | Refills: 0 | DISCHARGE

## 2021-04-29 RX ORDER — SODIUM CHLORIDE 9 MG/ML
1000 INJECTION, SOLUTION INTRAVENOUS
Refills: 0 | Status: DISCONTINUED | OUTPATIENT
Start: 2021-04-29 | End: 2021-04-29

## 2021-04-29 RX ORDER — SODIUM CHLORIDE 9 MG/ML
3 INJECTION INTRAMUSCULAR; INTRAVENOUS; SUBCUTANEOUS ONCE
Refills: 0 | Status: DISCONTINUED | OUTPATIENT
Start: 2021-04-29 | End: 2021-04-29

## 2021-04-29 RX ORDER — IPRATROPIUM/ALBUTEROL SULFATE 18-103MCG
3 AEROSOL WITH ADAPTER (GRAM) INHALATION ONCE
Refills: 0 | Status: DISCONTINUED | OUTPATIENT
Start: 2021-04-29 | End: 2021-04-29

## 2021-04-29 RX ADMIN — FENTANYL CITRATE 50 MICROGRAM(S): 50 INJECTION INTRAVENOUS at 12:05

## 2021-04-29 RX ADMIN — Medication 3 MILLILITER(S): at 15:20

## 2021-04-29 RX ADMIN — FENTANYL CITRATE 50 MICROGRAM(S): 50 INJECTION INTRAVENOUS at 11:58

## 2021-04-29 NOTE — ASU DISCHARGE PLAN (ADULT/PEDIATRIC) - CARE PROVIDER_API CALL
Marv Almendarez)  Surgery  [address], [address]  [place] [address]  Phone: [phone]  Fax: [phone]  Follow Up Time: 2 weeks

## 2021-04-29 NOTE — BRIEF OPERATIVE NOTE - OPERATION/FINDINGS
open dan, port place with direct visualization, critical view of safety achieved, cystic duct and artery clip and cut, gallbladder removed from fossa with cautery. Irrigation and hemostasis achieved. 12mm port fascia closed and skin closed with 4-0 vicryl

## 2021-04-29 NOTE — ANESTHESIA FOLLOW-UP NOTE - NSEVALATIONFT_GEN_ALL_CORE
Called to see patient who complained of dizziness and shortness of breath after she came out of the bathroom. Seen patient on bed, AAOx3 not in distress, /66 - 128/69, HR 80 O2 Sat 95. C/L mild expiratory wheezing. Given Duoneb treatment with relief of SOB. O2 Sat  %. Surgical team informed about incident.

## 2021-04-29 NOTE — BRIEF OPERATIVE NOTE - NSICDXBRIEFPREOP_GEN_ALL_CORE_FT
PRE-OP DIAGNOSIS:  Cholelithiasis 29-Apr-2021 11:51:39  Maureen Arce  Morbid obesity 29-Apr-2021 11:51:53  Maureen Arce  Cholelithiasis without cholecystitis 29-Apr-2021 11:52:09  Maureen Arce

## 2021-04-29 NOTE — ASU DISCHARGE PLAN (ADULT/PEDIATRIC) - ASU DC SPECIAL INSTRUCTIONSFT
Follow up: Please call and make an appointment with Dr. [facility] 10-14 days after discharge. Also, please call and make an appointment with your primary care physician as per your usual schedule.     Activity: May return to normal activities as tolerated, however refrain from heavy lifting > 10-15 lbs.    Diet: May continue regular diet.    Medications: Please take all medications listed on your discharge paperwork as prescribed. Pain medication has been prescribed for you. Please, take it as it has been prescribed, do not drive or operate heavy machinery while taking narcotics.  You are encouraged to take over-the-counter tylenol and/or ibuprofen for pain relief when you feel your pain no longer warrants the use of narcotic pain medications, however DO NOT TAKE percocet and tylenol at the same time as they contain the same active ingredient (acetaminophen). Take only percocet OR tylenol.    Wound Care: Please, keep wound site clean and dry. You may shower, but do not bathe.    Patient is advised to RETURN TO THE EMERGENCY DEPARTMENT for any of the following - worsening pain, fever/chills, nausea/vomiting, altered mental status, chest pain, shortness of breath, or any other new / worsening symptom.

## 2021-05-04 LAB — SURGICAL PATHOLOGY STUDY: SIGNIFICANT CHANGE UP

## 2021-07-12 NOTE — ASU PREOP CHECKLIST - AS BP NONINV METHOD
[facility] [place] 7/12    CONTINUED STAY REVIEW    Payor: Kleber Butler 673 #:  [id number] Number: [id number]    Admit date: 7/2/21  Admit time:  1:26 AM    Admitting Physician: Lauren Pineda MD  Attending Physician:  Gladis Castelan* Cassie Mercado RN    7/11/2021 1703 Bolus from Bag 1 mg Intravenous Vicente Tipton RN      Insulin Aspart Pen (NOVOLOG) 100 UNIT/ML flexpen 1-10 Units     Date Action Dose Route User    7/11/2021 2319 Given 1 Units Subcutaneous (Right Lower Abdomen) SURGERY NOTE  Hosp Day # 10 PCP Elias Ren MD      Subjective: The patient is sitting up in chair today. He reports some improvement in his abdominal pain today. He had mild nausea but this resolved quickly.   He did not require Zofran with the nause WBC 7.5   < > 12.8*  --  12.2*  --   --   --  13.0* 10.9 11.6*   HGB 12.1*   < > 8.0*   < > 6.8*   < > 7.2* 7.6* 7.2* 7.6* 8.4*   MCV 89.2   < > 85.9  --  87.5  --   --   --  86.0 87.3 86.3   .0   < > 239.0  --  225.0  --   --   --  264.0 334.0 38 electronic

## 2021-07-13 NOTE — ED ADULT TRIAGE NOTE - ACCOMPANIED BY
History  Chief Complaint   Patient presents with    Numbness     Patient comes tonight with numbness,was here on Friday,states she feels numb and tingly all over,phosphorus was high Friday,was told by [facility] that she was in renal failure and needed to be admitted,but when she came here was told different      44-year-old female presents to the ED with complaints of numbness and tingling states she has been here several times almost daily recently for high phosphorus a low calcium she does get calcium replacement feels better and she is told to follow up with her outpatient doctor who has been treating this  Prior to Admission Medications   Prescriptions Last Dose Informant Patient Reported? Taking? ALPRAZolam (XANAX) 1 mg tablet  Self Yes Yes   Sig: Take 1 mg by mouth daily am   baclofen 10 mg tablet  Self Yes Yes   Sig: Take 10 mg by mouth 3 (three) times a day    calcitriol (ROCALTROL) 0 25 mcg capsule   No Yes   Sig: Take 1 capsule (0 25 mcg total) by mouth daily   ergocalciferol (VITAMIN D2) 50,000 units   No Yes   Sig: Take 1 capsule (50,000 Units total) by mouth once a week   fenofibrate (TRIGLIDE) 160 MG tablet  Self Yes Yes   Sig: Take 160 mg by mouth daily   ferrous sulfate 325 (65 Fe) mg tablet  Self Yes Yes   Sig: Take 325 mg by mouth Once Monday, wed, Friday   ibuprofen (MOTRIN) 600 mg tablet  Self No Yes   Sig: Take 1 tablet (600 mg total) by mouth every 6 (six) hours as needed for mild pain or moderate pain   levothyroxine (Synthroid) 150 mcg tablet   No Yes   Sig: Take 1 tablet (150 mcg total) by mouth daily   magnesium oxide (MAG-OX) 400 mg   No Yes   Sig: Take 1 tablet (400 mg total) by mouth 3 (three) times a day 9pm   ondansetron (ZOFRAN) 4 mg tablet  Self Yes Yes   Sig: Take 4 mg by mouth every 8 (eight) hours as needed for nausea or vomiting     oxyCODONE-acetaminophen (PERCOCET) 5-325 mg per tablet  Self Yes Yes   Sig: Take 2 tablets by mouth daily    pregabalin (LYRICA) 75 mg capsule  Self Yes Yes   Sig: Take 75 mg by mouth 3 (three) times a day as needed    zolpidem (AMBIEN CR) 6 25 MG CR tablet  Self Yes Yes   Sig: Take 6 25 mg by mouth daily at bedtime as needed for sleep      Facility-Administered Medications: None       Past Medical History:   Diagnosis Date    Acid reflux     Acute renal failure (HCC)     multiple episodes    Anemia     Anxiety     Chronic pain     DDD (degenerative disc disease), lumbar     Disease of thyroid gland     had surgery and now hypo    DVT (deep venous thrombosis) ([facility] [address] )     s/p ankle fracture    Hypercalcemia     Hyperlipidemia     Hyperthyroidism     Hypocalcemia     post op 2016    Migraine     Psychiatric disorder     anxiety depression    Seizures (HCC)     petit mal x1  4 years ago- all tests were neg   Spondylolisthesis of lumbar region     Treatment     spinal pain injections  last was 2016       Past Surgical History:   Procedure Laterality Date    BACK SURGERY       SECTION      x5    CYSTOCELE REPAIR  2017    DISCOGRAM      PARATHYROIDECTOMY      AL ANTERIOR COLPORRAPHY RPR CYSTOCELE W/CYSTO N/A 2017    Procedure: CYSTOCELE REPAIR;  Surgeon: John Washington MD;  Location: [address] [place] [address];  Service: Gynecology    AL ARTHRODESIS POSTERIOR INTERBODY LUMBAR N/A 2016    Procedure: L4-S1 LUMBAR LAMINECTOMY/DECOMPRESSION;  INSTRUMENTED POSTEROLATERAL FUSION/ INTERBODY L5-S1; ALLOGRAFT AND AUTOGRAFT (IMPULSE) ;   Surgeon: Roosevelt Bill MD;  [facility];  Service: Orthopedics    AL CYSTOURETHROSCOPY,URETER CATHETER Bilateral 2018    Procedure: INSERTION URETERAL CATHETERS PREOP;  Surgeon: Justine Clemens MD;  Location: [address] [place] [address];  Service: Urology    AL SLING OPER STRES INCONTINENCE N/A 2017    Procedure: MID URETHRAL SLING;  Surgeon: Bakari Thompson MD;  Location: [address] [place] [address];  Service: Urology    AL [address] ABD HYSTERECTOMY N/A 2018    Procedure: SUPRACERVICAL HYSTERECTOMY;  Surgeon: La Paulino MD;  Location: [facility];  Service: Gynecology    THYROIDECTOMY      TONSILECTOMY AND ADNOIDECTOMY      TONSILLECTOMY      TUBAL LIGATION         Family History   Problem Relation Age of Onset    Diabetes Mother     Hypertension Mother     Hyperlipidemia Father     Arrhythmia Father     Lung cancer Father     Diabetes Father      I have reviewed and agree with the history as documented  E-Cigarette/Vaping    E-Cigarette Use Never User      E-Cigarette/Vaping Substances    Nicotine No     THC No     CBD No     Flavoring No     Other No     Unknown No      Social History     Tobacco Use    Smoking status: Current Every Day Smoker     Packs/day: 0 50     Years: 25 00     Pack years: 12 50     Types: Cigarettes    Smokeless tobacco: Never Used   Vaping Use    Vaping Use: Never used   Substance Use Topics    Alcohol use: Not Currently     Comment: Alcohol intake:   Occasional  - As per Jose Martin Drug use: No       Review of Systems   Constitutional: Negative for activity change, chills, diaphoresis and fever  HENT: Negative for congestion, ear pain, nosebleeds, sore throat, trouble swallowing and voice change  Eyes: Negative for pain, discharge and redness  Respiratory: Negative for apnea, cough, choking, shortness of breath, wheezing and stridor  Cardiovascular: Negative for chest pain and palpitations  Gastrointestinal: Negative for abdominal distention, abdominal pain, constipation, diarrhea, nausea and vomiting  Endocrine: Negative for polydipsia  Genitourinary: Negative for difficulty urinating, dysuria, flank pain, frequency, hematuria and urgency  Musculoskeletal: Negative for back pain, gait problem, joint swelling, myalgias, neck pain and neck stiffness  Skin: Negative for pallor and rash  Neurological: Positive for weakness  Negative for dizziness, tremors, syncope, speech difficulty, numbness and headaches  Hematological: Negative for adenopathy  Psychiatric/Behavioral: Negative for confusion, hallucinations, self-injury and suicidal ideas  The patient is not nervous/anxious  Physical Exam  Physical Exam  Vitals and nursing note reviewed  Constitutional:       General: She is not in acute distress  Appearance: She is well-developed  She is not diaphoretic  HENT:      Head: Normocephalic and atraumatic  Right Ear: External ear normal       Left Ear: External ear normal       Nose: Nose normal    Eyes:      Conjunctiva/sclera: Conjunctivae normal       Pupils: Pupils are equal, round, and reactive to light  Cardiovascular:      Rate and Rhythm: Normal rate and regular rhythm  Heart sounds: Normal heart sounds  Pulmonary:      Effort: Pulmonary effort is normal       Breath sounds: Normal breath sounds  Abdominal:      General: Bowel sounds are normal       Palpations: Abdomen is soft  Musculoskeletal:         General: Normal range of motion  Cervical back: Normal range of motion and neck supple  Skin:     General: Skin is warm and dry  Neurological:      Mental Status: She is alert and oriented to person, place, and time  Deep Tendon Reflexes: Reflexes are normal and symmetric           Vital Signs  ED Triage Vitals [07/12/21 0034]   Temperature Pulse Respirations Blood Pressure SpO2   97 6 °F (36 4 °C) 77 18 157/94 97 %      Temp Source Heart Rate Source Patient Position - Orthostatic VS BP Location FiO2 (%)   Tympanic Monitor Lying Right arm --      Pain Score       --           Vitals:    07/12/21 0034 07/12/21 0336   BP: 157/94 140/72   Pulse: 77 72   Patient Position - Orthostatic VS: Lying Lying         Visual Acuity      ED Medications  Medications   calcium gluconate 2 g in sodium chloride 0 9% 100 mL (premix) (0 g Intravenous Stopped 7/12/21 0323)       Diagnostic Studies  Results Reviewed     Procedure Component Value Units Date/Time    Comprehensive metabolic panel [[id number]]  (Abnormal) Collected: 07/12/21 0056    Lab Status: Final result Specimen: Blood from Arm, Right Updated: 07/12/21 0154     Sodium 141 mmol/L      Potassium 3 6 mmol/L      Chloride 103 mmol/L      CO2 26 mmol/L      ANION GAP 12 mmol/L      BUN 14 mg/dL      Creatinine 1 03 mg/dL      Glucose 118 mg/dL      Calcium 7 8 mg/dL      Corrected Calcium 8 3 mg/dL      AST 29 U/L      ALT 43 U/L      Alkaline Phosphatase 64 U/L      Total Protein 7 1 g/dL      Albumin 3 4 g/dL      Total Bilirubin 0 34 mg/dL      eGFR 66 ml/min/1 73sq m     Narrative:      [place] guidelines for Chronic Kidney Disease (CKD):     Stage 1 with normal or high GFR (GFR > 90 mL/min/1 73 square meters)    Stage 2 Mild CKD (GFR = 60-89 mL/min/1 73 square meters)    Stage 3A Moderate CKD (GFR = 45-59 mL/min/1 73 square meters)    Stage 3B Moderate CKD (GFR = 30-44 mL/min/1 73 square meters)    Stage 4 Severe CKD (GFR = 15-29 mL/min/1 73 square meters)    Stage 5 End Stage CKD (GFR <15 mL/min/1 73 square meters)  Note: GFR calculation is accurate only with a steady state creatinine    Phosphorus [[id number]]  (Abnormal) Collected: 07/12/21 0056    Lab Status: Final result Specimen: Blood from Arm, Right Updated: 07/12/21 0138     Phosphorus 5 2 mg/dL     Magnesium [[id number]]  (Normal) Collected: 07/12/21 0056    Lab Status: Final result Specimen: Blood from Arm, Right Updated: 07/12/21 0138     Magnesium 2 1 mg/dL     Calcium, ionized [[id number]]  (Abnormal) Collected: 07/12/21 0058    Lab Status: Final result Specimen: Blood from Arm, Right Updated: 07/12/21 0110     Calcium, Ionized 1 01 mmol/L     CBC and differential [[id number]]  (Abnormal) Collected: 07/12/21 0056    Lab Status: Final result Specimen: Blood from Arm, Right Updated: 07/12/21 0109     WBC 6 41 Thousand/uL      RBC 4 20 Million/uL      Hemoglobin 12 5 g/dL      Hematocrit 38 3 %      MCV 91 fL      MCH 29 8 pg      MCHC 32 6 g/dL      RDW 15 5 %      MPV 10 4 fL      Platelets 641 Thousands/uL      nRBC 0 /100 WBCs      Neutrophils Relative 61 %      Immat GRANS % 0 %      Lymphocytes Relative 30 %      Monocytes Relative 6 %      Eosinophils Relative 2 %      Basophils Relative 1 %      Neutrophils Absolute 3 92 Thousands/µL      Immature Grans Absolute 0 02 Thousand/uL      Lymphocytes Absolute 1 91 Thousands/µL      Monocytes Absolute 0 38 Thousand/µL      Eosinophils Absolute 0 14 Thousand/µL      Basophils Absolute 0 04 Thousands/µL                  No orders to display              Procedures  Procedures         ED Course                             SBIRT 22yo+      Most Recent Value   SBIRT (22 yo +)   In order to provide better care to our patients, we are screening all of our patients for alcohol and drug use  Would it be okay to ask you these screening questions? No Filed at: 07/12/2021 0059                    MDM    Disposition  Final diagnoses:   Hypocalcemia     Time reflects when diagnosis was documented in both MDM as applicable and the Disposition within this note     Time User Action Codes Description Comment    7/12/2021  2:00 AM Irma Erwin [E83 51] Hypocalcemia       ED Disposition     ED Disposition Condition Date/Time Comment    Discharge Stable Mon Jul 12, 2021  2:00 AM [facility] discharge to home/self care  Follow-up Information     Follow up With Specialties Details Why Kofi Gutierres MD Internal Medicine Schedule an appointment as soon as possible for a visit  As needed [place]   [phone]          Please follow-up with your endocrinologist as soon as possible            Discharge Medication List as of 7/12/2021  3:36 AM      CONTINUE these medications which have NOT CHANGED    Details   ALPRAZolam (XANAX) 1 mg tablet Take 1 mg by mouth daily am, Historical Med      baclofen 10 mg tablet Take 10 mg by mouth 3 (three) times a day , Historical Med      calcitriol (ROCALTROL) 0 25 mcg capsule Take 1 capsule (0 25 mcg total) by mouth daily, Starting Mon 6/7/2021, Normal      ergocalciferol (VITAMIN D2) 50,000 units Take 1 capsule (50,000 Units total) by mouth once a week, Starting Wed 3/31/2021, Normal      fenofibrate (TRIGLIDE) 160 MG tablet Take 160 mg by mouth daily, Historical Med      ferrous sulfate 325 (65 Fe) mg tablet Take 325 mg by mouth Once Monday, wed, Friday, Historical Med      ibuprofen (MOTRIN) 600 mg tablet Take 1 tablet (600 mg total) by mouth every 6 (six) hours as needed for mild pain or moderate pain, Starting Tue 6/2/2020, Normal      levothyroxine (Synthroid) 150 mcg tablet Take 1 tablet (150 mcg total) by mouth daily, Starting Mon 6/7/2021, Normal      magnesium oxide (MAG-OX) 400 mg Take 1 tablet (400 mg total) by mouth 3 (three) times a day 9pm, Starting Mon 6/7/2021, Normal      ondansetron (ZOFRAN) 4 mg tablet Take 4 mg by mouth every 8 (eight) hours as needed for nausea or vomiting , Historical Med      oxyCODONE-acetaminophen (PERCOCET) 5-325 mg per tablet Take 2 tablets by mouth daily , Historical Med      pregabalin (LYRICA) 75 mg capsule Take 75 mg by mouth 3 (three) times a day as needed , Historical Med      zolpidem (AMBIEN CR) 6 25 MG CR tablet Take 6 25 mg by mouth daily at bedtime as needed for sleep, Historical Med           No discharge procedures on file      PDMP Review     None          ED Provider  Electronically Signed by           Everton Rosen,   07/13/21 1370 Self

## 2022-09-08 NOTE — OB RN DELIVERY SUMMARY - NSMECDELIVBABYA_OBGYN_ALL_OB
Visual Field Report   Note: The correlating image(s) are scanned documents in EPIC for viewing    Test Run: 30-2    Reason for visual field: Post-CVA evaluation     Right eye  Description of the visual field: full field  Reliability: good  GHT: normal    Exam correlation: No CVA visual field defect identified        Description of the visual field: full field  Reliability: good  GHT: normal    Exam correlation: No CVA visual field defect identified               
no

## 2022-10-17 NOTE — OB PST NOTE - NS MD HP INPLANTS MED DEV
[facility] is unable to accept the patient back because she needs IV medication to November 13th. Referral made to [facility] in [place].   JUSTEN Negron None

## 2023-02-03 NOTE — ED ADULT NURSE NOTE - NS PRO PASSIVE SMOKE EXP
NEPHROLOGY     Patient seen and examined.    MEDICATIONS  (STANDING):  allopurinol 100 milliGRAM(s) Oral daily  calcitriol   Capsule 0.5 MICROGram(s) Oral daily  carvedilol 6.25 milliGRAM(s) Oral every 12 hours  chlorhexidine 2% Cloths 1 Application(s) Topical daily  cholecalciferol 2000 Unit(s) Oral daily  cilostazol 100 milliGRAM(s) Oral two times a day  colchicine 0.6 milliGRAM(s) Oral daily  folic acid 1 milliGRAM(s) Oral daily  heparin   Injectable 5000 Unit(s) SubCutaneous every 8 hours  pantoprazole  Injectable 40 milliGRAM(s) IV Push daily    VITALS:  T(C): , Max: 37.2 (23 @ 15:47)  T(F): , Max: 99 (23 @ 15:47)  HR: 72 (23 @ 12:26)  BP: 120/63 (23 @ 12:26)  RR: 19 (23 @ 12:26)  SpO2: 99% (23 @ 12:26)    PHYSICAL EXAM:  Constitutional: NAD, Alert  HEENT: NCAT, DMM  Neck: Supple, No JVD  Respiratory: CTA-b/l  Cardiovascular: s1s2  Gastrointestinal: BS+, soft, NT/ND  Extremities: 1+ b/l LE edema; (+)b/l foot tenderness, R foot dressed  Neurological: no focal deficits; strength grossly intact  Back: no CVAT b/l    LABS:                        8.2    4.04  )-----------( 56       ( 2023 05:26 )             26.3     0203    141  |  110<H>  |  72<H>  ----------------------------<  187<H>  4.2   |  18<L>  |  2.17<H>    Ca    8.7      2023 05:26  Phos  5.0     -  Mg     2.50         TPro  6.0  /  Alb  2.8<L>  /  TBili  2.3<H>  /  DBili  x   /  AST  106<H>  /  ALT  371<H>  /  AlkPhos  152<H>  02-03    Urine Studies:  Urinalysis Basic - ( 2023 17:00 )    Color: Yellow / Appearance: Slightly Turbid / S.018 / pH: x  Gluc: x / Ketone: Negative  / Bili: Negative / Urobili: 3 mg/dL   Blood: x / Protein: 30 mg/dL / Nitrite: Negative   Leuk Esterase: Negative / RBC: 23 /HPF / WBC 2 /HPF   Sq Epi: x / Non Sq Epi: 2 /HPF / Bacteria: Negative          RADIOLOGY & ADDITIONAL STUDIES:      ASSESSMENT/RECOMMEND    Marleen Byrne NP  [facility]  [phone] NEPHROLOGY     Patient seen and examined resting comfortably, reports still has right foot pain but feels alittle better, denies sob, in no acute distress.     MEDICATIONS  (STANDING):  allopurinol 100 milliGRAM(s) Oral daily  calcitriol   Capsule 0.5 MICROGram(s) Oral daily  carvedilol 6.25 milliGRAM(s) Oral every 12 hours  chlorhexidine 2% Cloths 1 Application(s) Topical daily  cholecalciferol 2000 Unit(s) Oral daily  cilostazol 100 milliGRAM(s) Oral two times a day  colchicine 0.6 milliGRAM(s) Oral daily  folic acid 1 milliGRAM(s) Oral daily  heparin   Injectable 5000 Unit(s) SubCutaneous every 8 hours  pantoprazole  Injectable 40 milliGRAM(s) IV Push daily    VITALS:  T(C): , Max: 37.2 (23 @ 15:47)  T(F): , Max: 99 (23 @ 15:47)  HR: 72 (23 @ 12:26)  BP: 120/63 (23 @ 12:26)  RR: 19 (23 @ 12:26)  SpO2: 99% (23 @ 12:26)    PHYSICAL EXAM:  Constitutional: NAD, Alert  HEENT: NCAT, DMM  Neck: Supple, No JVD  Respiratory: CTA-b/l  Cardiovascular: s1s2  Gastrointestinal: BS+, soft, NT/ND  Extremities: 1+ b/l LE edema; (+)b/l foot tenderness, R foot dressed  Neurological: no focal deficits; strength grossly intact  Back: no CVAT b/l    LABS:                        8.2    4.04  )-----------( 56       ( 2023 05:26 )             26.3     02-03    141  |  110<H>  |  72<H>  ----------------------------<  187<H>  4.2   |  18<L>  |  2.17<H>    Ca    8.7      2023 05:26  Phos  5.0     02-03  Mg     2.50     -    TPro  6.0  /  Alb  2.8<L>  /  TBili  2.3<H>  /  DBili  x   /  AST  106<H>  /  ALT  371<H>  /  AlkPhos  152<H>      Urine Studies:  Urinalysis Basic - ( 2023 17:00 )    Color: Yellow / Appearance: Slightly Turbid / S.018 / pH: x  Gluc: x / Ketone: Negative  / Bili: Negative / Urobili: 3 mg/dL   Blood: x / Protein: 30 mg/dL / Nitrite: Negative   Leuk Esterase: Negative / RBC: 23 /HPF / WBC 2 /HPF   Sq Epi: x / Non Sq Epi: 2 /HPF / Bacteria: Negative         No

## 2023-04-19 NOTE — ED ADULT NURSE NOTE - CAS TRG GEN SKIN COLOR
Normal for race

## 2023-04-25 NOTE — DISCHARGE NOTE OB - NS OB DC IMMUNIZATIONS2 YN
Patient: Sohail Duvall    Procedure Summary     Date: 04/25/23 Room / Location: [facility] ROOM 26 / SURGERY SAME DAY [facility]    Anesthesia Start: 1146 Anesthesia Stop: 1207    Procedure: GASTROSCOPY (Esophagus) Diagnosis: (ESOPHAGITIS, GASTRITIS)    Surgeons: Mir Rodriguez M.D. Responsible Provider: Lars Anderson III, M.D.    Anesthesia Type: general ASA Status: 3          Final Anesthesia Type: general  Last vitals  BP   Blood Pressure : (!) 171/70    Temp   36.2 °C (97.2 °F)    Pulse   61   Resp   18    SpO2   98 %      Anesthesia Post Evaluation    Patient location during evaluation: PACU  Patient participation: complete - patient participated  Level of consciousness: awake and alert  Pain score: 7  Chronic pain  Airway patency: patent  Anesthetic complications: no  Cardiovascular status: hemodynamically stable  Respiratory status: acceptable  Hydration status: euvolemic    PONV: none          No notable events documented.     Nurse Pain Score: 7 (NPRS)         No

## 2023-08-21 NOTE — ED ADULT TRIAGE NOTE - BMI (KG/M2)
Introduction Text (Please End With A Colon): The following procedure was deferred: 5FU to left cheek and temple in the fall Size Of Lesion In Cm (Optional): 0 Detail Level: Detailed Other Procedure: 5FU 34.6

## 2023-11-04 NOTE — PROGRESS NOTE ADULT - SUBJECTIVE AND OBJECTIVE BOX
abnormal fetal heart rate tracing [facility] DIVISION OF KIDNEY DISEASES AND HYPERTENSION -- FOLLOW UP NOTE  --------------------------------------------------------------------------------  Chief Complaint:  Hypokalemia, Hypercalcemia, Hyperparathyroidism    24 hour events/subjective:  PARAG completed  Calcium continues uptrending      PAST HISTORY  --------------------------------------------------------------------------------  No significant changes to PMH, PSH, FHx, SHx, unless otherwise noted    ALLERGIES & MEDICATIONS  --------------------------------------------------------------------------------  Allergies    No Known Allergies    Intolerances      Standing Inpatient Medications  ascorbic acid 1000 milliGRAM(s) Oral daily  ferrous    sulfate 325 milliGRAM(s) Oral daily  heparin   Injectable 5000 Unit(s) SubCutaneous every 8 hours  magnesium oxide 400 milliGRAM(s) Oral three times a day with meals  potassium chloride    Tablet ER 40 milliEquivalent(s) Oral every 4 hours  potassium chloride  10 mEq/100 mL IVPB 10 milliEquivalent(s) IV Intermittent every 1 hour  prenatal multivitamin 1 Tablet(s) Oral daily  sodium chloride 0.9% with potassium chloride 40 mEq/L 1000 milliLiter(s) IV Continuous <Continuous>    PRN Inpatient Medications  acetaminophen   Tablet .. 975 milliGRAM(s) Oral every 6 hours PRN  ondansetron Injectable 4 milliGRAM(s) IV Push every 6 hours PRN      REVIEW OF SYSTEMS  --------------------------------------------------------------------------------  Gen: No weight changes, fatigue, fevers/chills, weakness  Skin: No rashes  Head/Eyes/Ears/Mouth: No headache; Normal hearing; Normal vision w/o blurriness; No sinus pain/discomfort, sore throat  Respiratory: No dyspnea, cough, wheezing, hemoptysis  CV: No chest pain, PND, orthopnea  GI: No abdominal pain, diarrhea, constipation, nausea, vomiting, melena, hematochezia  : No increased frequency, dysuria, hematuria, nocturia  MSK: No joint pain/swelling; no back pain; no edema  Neuro: No dizziness/lightheadedness, weakness, seizures, numbness, tingling  Heme: No easy bruising or bleeding  Endo: No heat/cold intolerance  Psych: No significant nervousness, anxiety, stress, depression    All other systems were reviewed and are negative, except as noted.    VITALS/PHYSICAL EXAM  --------------------------------------------------------------------------------  T(C): 36.8 (04-30-20 @ 07:55), Max: 37.1 (04-29-20 @ 19:38)  HR: 82 (04-30-20 @ 07:55) (74 - 82)  BP: 118/77 (04-30-20 @ 07:55) (96/55 - 118/77)  RR: 18 (04-30-20 @ 07:55) (18 - 18)  SpO2: 93% (04-30-20 @ 07:55) (93% - 98%)  Wt(kg): --        04-29-20 @ 07:01  -  04-30-20 @ 07:00  --------------------------------------------------------  IN: 1200 mL / OUT: 0 mL / NET: 1200 mL      Physical Exam:  	Gen: NAD, well-appearing  	HEENT: PERRL, supple neck  	Pulm: CTA B/L  	CV: RRR, S1S2; no rub  	Back: No spinal or CVA tenderness; no sacral edema  	Abd: +BS, soft, nontender/gravid abdomen+  	: No suprapubic tenderness  	UE: Warm, no edema; no asterixis  	LE: Warm,  no edema  	Neuro: No focal deficits  	Psych: Normal affect and mood  	Skin: Warm    LABS/STUDIES  --------------------------------------------------------------------------------              10.4   8.14  >-----------<  256      [04-30-20 @ 10:10]              31.7     140  |  104  |  6.0  ----------------------------<  121      [04-30-20 @ 10:10]  3.2   |  20.0  |  0.47        Ca     13.0     [04-30-20 @ 10:10]      Mg     1.4     [04-30-20 @ 10:09]      Phos  2.0     [04-30-20 @ 10:09]    TPro  6.6  /  Alb  3.4  /  TBili  <0.2  /  DBili  0.1  /  AST  12  /  ALT  9   /  AlkPhos  94  [04-30-20 @ 10:10]          Creatinine Trend:  SCr 0.47 [04-30 @ 10:10]  SCr 0.46 [04-29 @ 06:41]  SCr 0.49 [04-28 @ 14:06]    Urinalysis - [04-28-20 @ 22:15]      Color Yellow / Appearance Clear / SG 1.010 / pH 7.0      Gluc Negative / Ketone Negative  / Bili Negative / Urobili Negative       Blood Negative / Protein Negative / Leuk Est Small / Nitrite Negative      RBC Negative / WBC 0-2 / Hyaline  / Gran  / Sq Epi  / Non Sq Epi Occasional / Bacteria     Urine Sodium 55      [04-28-20 @ 22:13]  Urine Urea Nitrogen 118      [04-29-20 @ 00:58]  Urine Potassium 12      [04-28-20 @ 22:13]  Urine Chloride 48      [04-28-20 @ 22:13]  Urine Phosphorus 9.3      [04-29-20 @ 00:58]    Iron 26, TIBC 420, %sat 6      [04-28-20 @ 18:11]  Ferritin 87      [04-28-20 @ 18:11]  PTH -- (Ca 12.9)      [04-29-20 @ 01:20]   76  Vitamin D (25OH) 13.1      [04-29-20 @ 01:20]  TSH 0.65      [04-29-20 @ 06:41] [facility] DIVISION OF KIDNEY DISEASES AND HYPERTENSION -- FOLLOW UP NOTE  --------------------------------------------------------------------------------  Chief Complaint:  Hypokalemia, Hypercalcemia, Hyperparathyroidism    24 hour events/subjective:  s/p PARAG yesterday  Getting IV fluids with electrolyte repletion  Seen and examined; no acute complaint      PAST HISTORY  --------------------------------------------------------------------------------  No significant changes to PMH, PSH, FHx, SHx, unless otherwise noted    ALLERGIES & MEDICATIONS  --------------------------------------------------------------------------------  Allergies    No Known Allergies    Intolerances      Standing Inpatient Medications  ascorbic acid 1000 milliGRAM(s) Oral daily  ferrous    sulfate 325 milliGRAM(s) Oral daily  heparin   Injectable 5000 Unit(s) SubCutaneous every 8 hours  magnesium oxide 400 milliGRAM(s) Oral three times a day with meals  potassium chloride    Tablet ER 40 milliEquivalent(s) Oral every 4 hours  potassium chloride  10 mEq/100 mL IVPB 10 milliEquivalent(s) IV Intermittent every 1 hour  prenatal multivitamin 1 Tablet(s) Oral daily  sodium chloride 0.9% with potassium chloride 40 mEq/L 1000 milliLiter(s) IV Continuous <Continuous>    PRN Inpatient Medications  acetaminophen   Tablet .. 975 milliGRAM(s) Oral every 6 hours PRN  ondansetron Injectable 4 milliGRAM(s) IV Push every 6 hours PRN      REVIEW OF SYSTEMS  --------------------------------------------------------------------------------  Gen: No weight changes, fatigue, fevers/chills, weakness  Skin: No rashes  Head/Eyes/Ears/Mouth: No headache; Normal hearing; Normal vision w/o blurriness; No sinus pain/discomfort, sore throat  Respiratory: No dyspnea, cough, wheezing, hemoptysis  CV: No chest pain, PND, orthopnea  GI: No abdominal pain, diarrhea, constipation, nausea, vomiting, melena, hematochezia  : No increased frequency, dysuria, hematuria, nocturia  MSK: No joint pain/swelling; no back pain; no edema  Neuro: No dizziness/lightheadedness, weakness, seizures, numbness, tingling  Heme: No easy bruising or bleeding  Endo: No heat/cold intolerance  Psych: No significant nervousness, anxiety, stress, depression    All other systems were reviewed and are negative, except as noted.    VITALS/PHYSICAL EXAM  --------------------------------------------------------------------------------  T(C): 36.8 (04-30-20 @ 07:55), Max: 37.1 (04-29-20 @ 19:38)  HR: 82 (04-30-20 @ 07:55) (74 - 82)  BP: 118/77 (04-30-20 @ 07:55) (96/55 - 118/77)  RR: 18 (04-30-20 @ 07:55) (18 - 18)  SpO2: 93% (04-30-20 @ 07:55) (93% - 98%)  Wt(kg): --        04-29-20 @ 07:01  -  04-30-20 @ 07:00  --------------------------------------------------------  IN: 1200 mL / OUT: 0 mL / NET: 1200 mL      Physical Exam:  	Gen: NAD, well-appearing  	HEENT: PERRL, supple neck  	Pulm: CTA B/L  	CV: RRR, S1S2; no rub  	Back: No spinal or CVA tenderness; no sacral edema  	Abd: +BS, soft, nontender/gravid abdomen+  	: No suprapubic tenderness  	UE: Warm, no edema; no asterixis  	LE: Warm,  no edema  	Neuro: No focal deficits  	Psych: Normal affect and mood  	Skin: Warm    LABS/STUDIES  --------------------------------------------------------------------------------              10.4   8.14  >-----------<  256      [04-30-20 @ 10:10]              31.7     140  |  104  |  6.0  ----------------------------<  121      [04-30-20 @ 10:10]  3.2   |  20.0  |  0.47        Ca     13.0     [04-30-20 @ 10:10]      Mg     1.4     [04-30-20 @ 10:09]      Phos  2.0     [04-30-20 @ 10:09]    TPro  6.6  /  Alb  3.4  /  TBili  <0.2  /  DBili  0.1  /  AST  12  /  ALT  9   /  AlkPhos  94  [04-30-20 @ 10:10]          Creatinine Trend:  SCr 0.47 [04-30 @ 10:10]  SCr 0.46 [04-29 @ 06:41]  SCr 0.49 [04-28 @ 14:06]    Urinalysis - [04-28-20 @ 22:15]      Color Yellow / Appearance Clear / SG 1.010 / pH 7.0      Gluc Negative / Ketone Negative  / Bili Negative / Urobili Negative       Blood Negative / Protein Negative / Leuk Est Small / Nitrite Negative      RBC Negative / WBC 0-2 / Hyaline  / Gran  / Sq Epi  / Non Sq Epi Occasional / Bacteria     Urine Sodium 55      [04-28-20 @ 22:13]  Urine Urea Nitrogen 118      [04-29-20 @ 00:58]  Urine Potassium 12      [04-28-20 @ 22:13]  Urine Chloride 48      [04-28-20 @ 22:13]  Urine Phosphorus 9.3      [04-29-20 @ 00:58]    Iron 26, TIBC 420, %sat 6      [04-28-20 @ 18:11]  Ferritin 87      [04-28-20 @ 18:11]  PTH -- (Ca 12.9)      [04-29-20 @ 01:20]   76  Vitamin D (25OH) 13.1      [04-29-20 @ 01:20]  TSH 0.65      [04-29-20 @ 06:41]

## 2024-04-05 NOTE — ED ADULT TRIAGE NOTE - NSWEIGHTCALCTOOLDRUG_GEN_A_CORE
Transition of Care:   Anticipated discharge back [facility] today. Call report to: [phone]  No room # yet.    3p transport stretcher with H2H.    Wound vac still in place.   CM updated Pt and called daughter about d/c time and plan.   IV ABX have been discharged  2nd IM letter given    Transition of Care Plan:    RUR: 22%  Prior Level of Functioning: IPR, prior at home with support  Disposition: IPR   If SNF or IPR: Date FOC offered: 4/4  Date FOC received: 4/4  Accepting facility: [facility]  Date authorization started with reference number: n/a  Date authorization received and expires:   Follow up appointments: IPR  DME needed: n/a  Transportation at discharge:   IM/IMM Medicare/ letter given: y  Caregiver Contact: Latoya Barnes, [phone]   Discharge Caregiver contacted prior to discharge? y  Care Conference needed?   Barriers to discharge:  clinical and dispo    used

## 2024-04-10 NOTE — CHART NOTE - NSCHARTNOTESELECT_GEN_ALL_CORE
Event Note/RENAL [de-identified] : MADALYN SENA is a 64 year old female with PMHx HLD, anxiety, lupus panniculitis, vitamin d insuff, presenting for f/u

## 2024-06-07 NOTE — ED STATDOCS - RESPIRATORY, MLM
I am the [facility] care coordinator for Aracelis Noestewart Please refer to my hand-in transitions note for more information.  All of my documentation can be found in EPIC. Please do not hesitate to contact me with any questions or concerns.  Thank you, MARTA Lino [facility] [phone] Fax: [phone]     Lungs clear

## 2025-05-28 ENCOUNTER — EMERGENCY (EMERGENCY)
Facility: HOSPITAL | Age: 40
LOS: 1 days | End: 2025-05-28
Attending: EMERGENCY MEDICINE
Payer: MEDICAID

## 2025-05-28 VITALS
SYSTOLIC BLOOD PRESSURE: 118 MMHG | HEART RATE: 71 BPM | WEIGHT: 234.13 LBS | DIASTOLIC BLOOD PRESSURE: 74 MMHG | TEMPERATURE: 98 F | OXYGEN SATURATION: 97 % | RESPIRATION RATE: 16 BRPM

## 2025-05-28 DIAGNOSIS — Z98.89 OTHER SPECIFIED POSTPROCEDURAL STATES: Chronic | ICD-10-CM

## 2025-05-28 DIAGNOSIS — Z90.79 ACQUIRED ABSENCE OF OTHER GENITAL ORGAN(S): Chronic | ICD-10-CM

## 2025-05-28 DIAGNOSIS — E89.2 POSTPROCEDURAL HYPOPARATHYROIDISM: Chronic | ICD-10-CM

## 2025-05-28 PROCEDURE — 99282 EMERGENCY DEPT VISIT SF MDM: CPT

## 2025-05-28 PROCEDURE — 99284 EMERGENCY DEPT VISIT MOD MDM: CPT

## 2025-05-28 PROCEDURE — T1013: CPT

## 2025-05-28 RX ORDER — IBUPROFEN 200 MG
600 TABLET ORAL ONCE
Refills: 0 | Status: COMPLETED | OUTPATIENT
Start: 2025-05-28 | End: 2025-05-28

## 2025-05-28 RX ORDER — ONDANSETRON HCL/PF 4 MG/2 ML
4 VIAL (ML) INJECTION ONCE
Refills: 0 | Status: DISCONTINUED | OUTPATIENT
Start: 2025-05-28 | End: 2025-05-28

## 2025-05-28 NOTE — ED PROVIDER NOTE - PHYSICAL EXAMINATION
GENERAL: no acute distress, comfortably in bed  HEAD: NC/AT  EYES: PERRLA, EOMI, Non-icteric  ENT: Mucous membranes moist, neck supple, no obvious erythema or exudates on throat exam  NEURO: No focal deficits, moving all extremities spontaneously, A&Ox3, no dysarthria, CN II-XII grossly intact  PSYCH: Normal affect, calm, fluent speech  RESP: CTAB, non-labored breathing, no crackles or rales  CVS: RRR, no murmur appreciated  GI: Soft, non-tender, non-distended  : No ruth, no suprapubic tenderness  EXTREMITIES: Nontender, no LE edema, sensation intact  SKIN: No obvious lesions

## 2025-05-28 NOTE — ED PROVIDER NOTE - CLINICAL SUMMARY MEDICAL DECISION MAKING FREE TEXT BOX
Patient is a 39yF with no known PMHx who presents to [facility] ED with complaints of headache, nausea, chills, body aches, sore throat and generalized fatigue. Patient hemodynamically stable. Afebrile. Physical exam unremarkable. Plan for Ibuprofen and reassess.

## 2025-05-28 NOTE — ED PROVIDER NOTE - PATIENT PORTAL LINK FT
You can access the FollowMyHealth Patient Portal offered by [facility] by registering at the following website: [email or web address]. By joining [facility]’s FollowMyHealth portal, you will also be able to view your health information using other applications (apps) compatible with our system.

## 2025-05-28 NOTE — ED PROVIDER NOTE - NSICDXPASTMEDICALHX_GEN_ALL_CORE_FT
PAST MEDICAL HISTORY:  Asthma, unspecified asthma severity, unspecified whether complicated, unspecified whether persistent     Calculus of gallbladder without cholecystitis without obstruction     COVID-19 3/2020    H/O hypoparathyroidism     Missed ab 2015

## 2025-05-28 NOTE — ED PROVIDER NOTE - AVIAN FLU SYMPTOMS
Physical Therapy Discharge    Visit Type: Discharge Summary  Visit: 4  Referring Provider: Baljeet Garcia MD  Medical Diagnosis (from order): M25.561 - Right knee pain, unspecified chronicity     SUBJECTIVE                                                                                                               Pt states Right knee has been okay, a little tired today. No flare ups with pain since last visit. She has her pool up and home and wants to walk around pool. Highest pain number of the last week is 5/10. Current Right knee pain 3/10. She feels comfortable continuing HEP      OBJECTIVE                                                                                                                               Stair Ambulation  Descend   Ascends with no significant deficit in gait mechanics.  Difficulty with descent and eccentric control of Right LE d/t pain, at home reports she has to descend one step at a time    Functional Testing  Double Leg Squat  Pain at end range of squat, otherwise good body mechanics       Outcome/Assessments  Outcome Measures:   From 6/12/24:  Outcome Measures:   Lower Extremity Functional Scale: LEFS Calculated Total: 39 (0=extreme difficulty; 80=no difficulty) see flowsheet for additional documentation    From eval:  Lower Extremity Functional Scale: LEFS Calculated Total: 37 (0=extreme difficulty; 80=no difficulty) see flowsheet for additional documentation        Treatment     Therapeutic Exercise  Seated SciFit Recumbent Bike seat 8, resistance 3, x 6 min for increased activity tolerance, ROM, subjective taken during this time.    Updated HEP per below    Skilled input: as detailed above    Writer verbally educated and received verbal consent for hand placement, positioning of patient, and techniques to be performed today from patient for hand placement and palpation for techniques and therapist position for techniques as described above and how they are pertinent to the  patient's plan of care.  Home Exercise Program  Access Code: [email or web address]  URL: [email or web address]  Date: 06/12/2024  Prepared by: Dante Briggs    Exercises  - Straight Leg Raise with External Rotation  - 1-2 x daily - 5 x weekly - 3 sets - 15 reps - 10 hold  - Sidelying Hip Adduction  - 1-2 x daily - 5 x weekly - 3 sets - 15 reps - 10 hold  - Clam with Resistance  - 1-2 x daily - 7 x weekly - 3 sets - 15 reps  - Beginner Reverse Clamshell  - 1-2 x daily - 5 x weekly - 3 sets - 15 reps - 5 hold  - Supine Bridge with Resistance Band  - 1-2 x daily - 7 x weekly - 3 sets - 15 reps  - Hip Abduction with Resistance Loop  - 2 x daily - 7 x weekly - 3 sets - 15 reps  - Hip Extension with Resistance Loop  - 2 x daily - 7 x weekly - 3 sets - 15 reps  - Mini Squat with Counter Support  - 2 x daily - 7 x weekly - 3 sets - 15 reps  - Mini Lunge  - 2 x daily - 7 x weekly - 3 sets - 15 reps      ASSESSMENT                                                                                                          To date the patient has made gains as expected.    Patient is discharged from physical therapy as of 6/12/2024  Pt has been seen for 4 visits of physical therapy with treatment focused on ROM, strength, activity tolerance, pain levels, and functional mobility. Pt has demonstrated improvements in ROM, strength, functional mobility, activity tolerance, pain tolerance. She continues to have increased Right knee pain with higher level of activity in a day and end range flexion with functional movements. Prior to leaving patient's home exercise program was finalized and all questions and concerns were addressed.       Education:   - Results of above outlined education: Verbalizes understanding, Demonstrates understanding and Needs reinforcement    PLAN                                                                                                                           Discharge from skilled therapy  with instructions/recommendations to: continue home exercise program    Goals  Decrease pain/symptoms to 2/10  Improve involved strength to within functional limits   Improve involved ROM to within functional limits   The above improvements in impairments to assist in obtaining goals listed below  Long Term Goals: to be met by end of plan of care  1. Patient will squat lift laundry basket and groceries from floor with reported manageable/tolerable pain and without reported difficulty for completion of household tasks Status: not met Continued difficulty with deep squat  2. Patient will ascend and descend 1 flight of steps, without hand rail, and without difficulty for safe access to laundry room Status: partially met Pain with descent, no issues with ascent  3. Patient will demonstrate ability to negotiate level and unlevel surfaces at variable velocities, including change of direction without increased pain or instability to return to age appropriate and community activities at prior level of function. Status: partially met  Depending on day Right knee can make walking difficult  4. Lower Extremity Functional Scale: Patient will score 60 or higher on The Lower Extremity Functional Scale to indicate a decreased level of difficulty with walking and moving around. (minimal clinically important difference: 9 points change)  Status: not met      Therapy procedure time and total treatment time can be found documented on the Time Entry flowsheet     No

## 2025-05-28 NOTE — ED PROVIDER NOTE - ATTENDING CONTRIBUTION TO CARE
39 year old female no PMHx c/o malaise. PE unremarkable. likely viral syndrome. supportive care. PMD follow up

## 2025-05-28 NOTE — ED PROVIDER NOTE - OBJECTIVE STATEMENT
Patient is a 39yF with no known PMHx who presents to [facility] ED with complaints of headache, nausea, chills, body aches, sore throat and generalized fatigue. Denies recent sick contacts. Denies fever, SOB, abdominal pain, dysuria, hematuria. Denies alcohol/tobacco/illicit drug use.

## 2025-05-28 NOTE — ED PROVIDER NOTE - NSFOLLOWUPINSTRUCTIONS_ED_ALL_ED_FT
Viral Illness, Adult  Viruses are tiny germs that can get into a person's body and cause illness. There are many different types of viruses, and they cause many types of illness. Viral illnesses can range from mild to severe. They can affect various parts of the body.    Short-term conditions that are caused by a virus include colds and the flu (influenza). Long-term conditions that are caused by a virus include herpes, shingles, and HIV (human immunodeficiency virus) infection. A few viruses have been linked to certain cancers.    What are the causes?  Many types of viruses can cause illness. Viruses invade cells in your body, multiply, and cause the infected cells to work abnormally or die. When these cells die, they release more of the virus. When this happens, you develop symptoms of the illness, and the virus continues to spread to other cells. If the virus takes over the function of the cell, it can cause the cell to divide and grow out of control. This happens when a virus causes cancer.    Different viruses get into the body in different ways. You can get a virus by:  Swallowing food or water that has come in contact with the virus (is contaminated).  Breathing in droplets that have been coughed or sneezed into the air by an infected person.  Touching a surface that has been contaminated with the virus and then touching your eyes, nose, or mouth.  Being bitten by an insect or animal that carries the virus.  Having sexual contact with a person who is infected with the virus.  Being exposed to blood or fluids that contain the virus, either through an open cut or during a transfusion.  If a virus enters your body, your body's defense system (immune system) will try to fight the virus. You may be at higher risk for a viral illness if your immune system is weak.    What are the signs or symptoms?  You may have these symptoms, depending on the type of virus and the location of the cells that it invades:  Cold and flu viruses:  Fever.  Headache.  Sore throat.  Muscle aches.  Stuffy nose (nasal congestion).  Cough.  Digestive system (gastrointestinal) viruses:  Fever.  Pain in the abdomen.  Nausea.  Diarrhea.  Liver viruses (hepatitis):  Loss of appetite.  Tiredness.  Skin or the white parts of your eyes turning yellow (jaundice).  Brain and spinal cord viruses:  Fever.  Headache.  Stiff neck.  Nausea and vomiting.  Confusion or sleepiness.  Skin viruses:  Warts.  Itching.  Rash.  Sexually transmitted viruses:  Discharge.  Swelling.  Redness.  Rash.  How is this diagnosed?  This condition may be diagnosed based on one or more of the following:  Symptoms.  Medical history.  Physical exam.  Blood test, sample of mucus from your lungs (sputum sample), stool sample, or a swab of body fluids or a skin sore (lesion).  How is this treated?  Viruses can be hard to treat because they live within cells. Antibiotic medicines do not treat viruses because these medicines do not get inside cells. Treatment for a viral illness may include:  Resting and drinking plenty of fluids.  Medicines to relieve symptoms. These can include over-the-counter medicine for pain and fever, medicines for cough or congestion, and medicines to relieve diarrhea.  Antiviral medicines. These medicines are available only for certain types of viruses.  Some viral illnesses can be prevented with vaccinations. A common example is the flu shot.    Follow these instructions at home:  Medicines    Take over-the-counter and prescription medicines only as told by your health care provider.  If you were prescribed an antiviral medicine, take it as told by your health care provider. Do not stop taking the antiviral even if you start to feel better.  Be aware of when antibiotics are needed and when they are not needed. Antibiotics do not treat viruses. You may get an antibiotic if your health care provider thinks that you may have, or are at risk for, a bacterial infection and you have a viral infection.  Do not ask for an antibiotic prescription if you have been diagnosed with a viral illness. Antibiotics will not make your illness go away faster.  Frequently taking antibiotics when they are not needed can lead to antibiotic resistance. When this develops, the medicine no longer works against the bacteria that it normally fights.  General instructions      Drink enough fluids to keep your urine pale yellow.  Rest as much as possible.  Return to your normal activities as told by your health care provider. Ask your health care provider what activities are safe for you.  Keep all follow-up visits as told by your health care provider. This is important.  How is this prevented?    To reduce your risk of viral illness:  Wash your hands often with soap and water for at least 20 seconds. If soap and water are not available, use hand .  Avoid touching your nose, eyes, and mouth, especially if you have not washed your hands recently.  If anyone in your household has a viral infection, clean all household surfaces that may have been in contact with the virus. Use soap and hot water. You may also use bleach that you have added water to (diluted).  Stay away from people who are sick with symptoms of a viral infection.  Do not share items such as toothbrushes and water bottles with other people.  Keep your vaccinations up to date. This includes getting a yearly flu shot.  Eat a healthy diet and get plenty of rest.  Contact a health care provider if:  You have symptoms of a viral illness that do not go away.  Your symptoms come back after going away.  Your symptoms get worse.  Get help right away if you have:  Trouble breathing.  A severe headache or a stiff neck.  Severe vomiting or pain in your abdomen.  These symptoms may represent a serious problem that is an emergency. Do not wait to see if the symptoms will go away. Get medical help right away. Call your local emergency services (911 in the [place] Do not drive yourself to the hospital.    Summary  Viruses are types of germs that can get into a person's body and cause illness. Viral illnesses can range from mild to severe. They can affect various parts of the body.  Viruses can be hard to treat. There are medicines to relieve symptoms, and there are some antiviral medicines.  If you were prescribed an antiviral medicine, take it as told by your health care provider. Do not stop taking the antiviral even if you start to feel better.  Contact a health care provider if you have symptoms of a viral illness that do not go away.  This information is not intended to replace advice given to you by your health care provider. Make sure you discuss any questions you have with your health care provider. - You were seen and evaluated in the ED. Please follow up with your Primary Care Doctor in 2-3 days for re-evaluation and continued management.     - Please return to the Emergency Room if you develop chest pain, shortness of breath, lightheadedness, or if your symptoms worsen.     Viral Illness, Adult  Viruses are tiny germs that can get into a person's body and cause illness. There are many different types of viruses, and they cause many types of illness. Viral illnesses can range from mild to severe. They can affect various parts of the body.    Short-term conditions that are caused by a virus include colds and the flu (influenza). Long-term conditions that are caused by a virus include herpes, shingles, and HIV (human immunodeficiency virus) infection. A few viruses have been linked to certain cancers.    What are the causes?  Many types of viruses can cause illness. Viruses invade cells in your body, multiply, and cause the infected cells to work abnormally or die. When these cells die, they release more of the virus. When this happens, you develop symptoms of the illness, and the virus continues to spread to other cells. If the virus takes over the function of the cell, it can cause the cell to divide and grow out of control. This happens when a virus causes cancer.    Different viruses get into the body in different ways. You can get a virus by:  Swallowing food or water that has come in contact with the virus (is contaminated).  Breathing in droplets that have been coughed or sneezed into the air by an infected person.  Touching a surface that has been contaminated with the virus and then touching your eyes, nose, or mouth.  Being bitten by an insect or animal that carries the virus.  Having sexual contact with a person who is infected with the virus.  Being exposed to blood or fluids that contain the virus, either through an open cut or during a transfusion.  If a virus enters your body, your body's defense system (immune system) will try to fight the virus. You may be at higher risk for a viral illness if your immune system is weak.    What are the signs or symptoms?  You may have these symptoms, depending on the type of virus and the location of the cells that it invades:  Cold and flu viruses:  Fever.  Headache.  Sore throat.  Muscle aches.  Stuffy nose (nasal congestion).  Cough.  Digestive system (gastrointestinal) viruses:  Fever.  Pain in the abdomen.  Nausea.  Diarrhea.  Liver viruses (hepatitis):  Loss of appetite.  Tiredness.  Skin or the white parts of your eyes turning yellow (jaundice).  Brain and spinal cord viruses:  Fever.  Headache.  Stiff neck.  Nausea and vomiting.  Confusion or sleepiness.  Skin viruses:  Warts.  Itching.  Rash.  Sexually transmitted viruses:  Discharge.  Swelling.  Redness.  Rash.  How is this diagnosed?  This condition may be diagnosed based on one or more of the following:  Symptoms.  Medical history.  Physical exam.  Blood test, sample of mucus from your lungs (sputum sample), stool sample, or a swab of body fluids or a skin sore (lesion).  How is this treated?  Viruses can be hard to treat because they live within cells. Antibiotic medicines do not treat viruses because these medicines do not get inside cells. Treatment for a viral illness may include:  Resting and drinking plenty of fluids.  Medicines to relieve symptoms. These can include over-the-counter medicine for pain and fever, medicines for cough or congestion, and medicines to relieve diarrhea.  Antiviral medicines. These medicines are available only for certain types of viruses.  Some viral illnesses can be prevented with vaccinations. A common example is the flu shot.    Follow these instructions at home:  Medicines    Take over-the-counter and prescription medicines only as told by your health care provider.  If you were prescribed an antiviral medicine, take it as told by your health care provider. Do not stop taking the antiviral even if you start to feel better.  Be aware of when antibiotics are needed and when they are not needed. Antibiotics do not treat viruses. You may get an antibiotic if your health care provider thinks that you may have, or are at risk for, a bacterial infection and you have a viral infection.  Do not ask for an antibiotic prescription if you have been diagnosed with a viral illness. Antibiotics will not make your illness go away faster.  Frequently taking antibiotics when they are not needed can lead to antibiotic resistance. When this develops, the medicine no longer works against the bacteria that it normally fights.  General instructions      Drink enough fluids to keep your urine pale yellow.  Rest as much as possible.  Return to your normal activities as told by your health care provider. Ask your health care provider what activities are safe for you.  Keep all follow-up visits as told by your health care provider. This is important.  How is this prevented?    To reduce your risk of viral illness:  Wash your hands often with soap and water for at least 20 seconds. If soap and water are not available, use hand .  Avoid touching your nose, eyes, and mouth, especially if you have not washed your hands recently.  If anyone in your household has a viral infection, clean all household surfaces that may have been in contact with the virus. Use soap and hot water. You may also use bleach that you have added water to (diluted).  Stay away from people who are sick with symptoms of a viral infection.  Do not share items such as toothbrushes and water bottles with other people.  Keep your vaccinations up to date. This includes getting a yearly flu shot.  Eat a healthy diet and get plenty of rest.  Contact a health care provider if:  You have symptoms of a viral illness that do not go away.  Your symptoms come back after going away.  Your symptoms get worse.  Get help right away if you have:  Trouble breathing.  A severe headache or a stiff neck.  Severe vomiting or pain in your abdomen.  These symptoms may represent a serious problem that is an emergency. Do not wait to see if the symptoms will go away. Get medical help right away. Call your local emergency services (911 in the [place] Do not drive yourself to the hospital.    Summary  Viruses are types of germs that can get into a person's body and cause illness. Viral illnesses can range from mild to severe. They can affect various parts of the body.  Viruses can be hard to treat. There are medicines to relieve symptoms, and there are some antiviral medicines.  If you were prescribed an antiviral medicine, take it as told by your health care provider. Do not stop taking the antiviral even if you start to feel better.  Contact a health care provider if you have symptoms of a viral illness that do not go away.  This information is not intended to replace advice given to you by your health care provider. Make sure you discuss any questions you have with your health care provider. - You were seen and evaluated in the ED. Please follow up with your Primary Care Doctor in 2-3 days for re-evaluation and continued management.     -Take Tylenol (Acetaminophen) 650mg or Motrin (Ibuprofen/Advil) 600mg every 6 hours as needed for symptomatic control.     - Please return to the Emergency Room if you develop chest pain, shortness of breath, lightheadedness, or if your symptoms worsen or persist.     - Le atendieron y evaluaron en urgencias. Por favor, consulte con kelsey médico de cabecera en 2 o 3 días para suman reevaluación y tratamiento continuo.    - Lucama Tylenol (paracetamol) 650 mg o Motrin (ibuprofeno/Advil) 600 mg cada 6 horas según sea necesario para el control sintomático.    - Regrese a urgencias si presenta dolor en el pecho, dificultad para respirar, mareos o si tamara síntomas empeoran o persisten.      Viral Illness, Adult  Viruses are tiny germs that can get into a person's body and cause illness. There are many different types of viruses, and they cause many types of illness. Viral illnesses can range from mild to severe. They can affect various parts of the body.    Short-term conditions that are caused by a virus include colds and the flu (influenza). Long-term conditions that are caused by a virus include herpes, shingles, and HIV (human immunodeficiency virus) infection. A few viruses have been linked to certain cancers.    What are the causes?  Many types of viruses can cause illness. Viruses invade cells in your body, multiply, and cause the infected cells to work abnormally or die. When these cells die, they release more of the virus. When this happens, you develop symptoms of the illness, and the virus continues to spread to other cells. If the virus takes over the function of the cell, it can cause the cell to divide and grow out of control. This happens when a virus causes cancer.    Different viruses get into the body in different ways. You can get a virus by:  Swallowing food or water that has come in contact with the virus (is contaminated).  Breathing in droplets that have been coughed or sneezed into the air by an infected person.  Touching a surface that has been contaminated with the virus and then touching your eyes, nose, or mouth.  Being bitten by an insect or animal that carries the virus.  Having sexual contact with a person who is infected with the virus.  Being exposed to blood or fluids that contain the virus, either through an open cut or during a transfusion.  If a virus enters your body, your body's defense system (immune system) will try to fight the virus. You may be at higher risk for a viral illness if your immune system is weak.    What are the signs or symptoms?  You may have these symptoms, depending on the type of virus and the location of the cells that it invades:  Cold and flu viruses:  Fever.  Headache.  Sore throat.  Muscle aches.  Stuffy nose (nasal congestion).  Cough.  Digestive system (gastrointestinal) viruses:  Fever.  Pain in the abdomen.  Nausea.  Diarrhea.  Liver viruses (hepatitis):  Loss of appetite.  Tiredness.  Skin or the white parts of your eyes turning yellow (jaundice).  Brain and spinal cord viruses:  Fever.  Headache.  Stiff neck.  Nausea and vomiting.  Confusion or sleepiness.  Skin viruses:  Warts.  Itching.  Rash.  Sexually transmitted viruses:  Discharge.  Swelling.  Redness.  Rash.  How is this diagnosed?  This condition may be diagnosed based on one or more of the following:  Symptoms.  Medical history.  Physical exam.  Blood test, sample of mucus from your lungs (sputum sample), stool sample, or a swab of body fluids or a skin sore (lesion).  How is this treated?  Viruses can be hard to treat because they live within cells. Antibiotic medicines do not treat viruses because these medicines do not get inside cells. Treatment for a viral illness may include:  Resting and drinking plenty of fluids.  Medicines to relieve symptoms. These can include over-the-counter medicine for pain and fever, medicines for cough or congestion, and medicines to relieve diarrhea.  Antiviral medicines. These medicines are available only for certain types of viruses.  Some viral illnesses can be prevented with vaccinations. A common example is the flu shot.    Follow these instructions at home:  Medicines    Take over-the-counter and prescription medicines only as told by your health care provider.  If you were prescribed an antiviral medicine, take it as told by your health care provider. Do not stop taking the antiviral even if you start to feel better.  Be aware of when antibiotics are needed and when they are not needed. Antibiotics do not treat viruses. You may get an antibiotic if your health care provider thinks that you may have, or are at risk for, a bacterial infection and you have a viral infection.  Do not ask for an antibiotic prescription if you have been diagnosed with a viral illness. Antibiotics will not make your illness go away faster.  Frequently taking antibiotics when they are not needed can lead to antibiotic resistance. When this develops, the medicine no longer works against the bacteria that it normally fights.  General instructions      Drink enough fluids to keep your urine pale yellow.  Rest as much as possible.  Return to your normal activities as told by your health care provider. Ask your health care provider what activities are safe for you.  Keep all follow-up visits as told by your health care provider. This is important.  How is this prevented?    To reduce your risk of viral illness:  Wash your hands often with soap and water for at least 20 seconds. If soap and water are not available, use hand .  Avoid touching your nose, eyes, and mouth, especially if you have not washed your hands recently.  If anyone in your household has a viral infection, clean all household surfaces that may have been in contact with the virus. Use soap and hot water. You may also use bleach that you have added water to (diluted).  Stay away from people who are sick with symptoms of a viral infection.  Do not share items such as toothbrushes and water bottles with other people.  Keep your vaccinations up to date. This includes getting a yearly flu shot.  Eat a healthy diet and get plenty of rest.  Contact a health care provider if:  You have symptoms of a viral illness that do not go away.  Your symptoms come back after going away.  Your symptoms get worse.  Get help right away if you have:  Trouble breathing.  A severe headache or a stiff neck.  Severe vomiting or pain in your abdomen.  These symptoms may represent a serious problem that is an emergency. Do not wait to see if the symptoms will go away. Get medical help right away. Call your local emergency services (911 in the [place] Do not drive yourself to the hospital.    Summary  Viruses are types of germs that can get into a person's body and cause illness. Viral illnesses can range from mild to severe. They can affect various parts of the body.  Viruses can be hard to treat. There are medicines to relieve symptoms, and there are some antiviral medicines.  If you were prescribed an antiviral medicine, take it as told by your health care provider. Do not stop taking the antiviral even if you start to feel better.  Contact a health care provider if you have symptoms of a viral illness that do not go away.  This information is not intended to replace advice given to you by your health care provider. Make sure you discuss any questions you have with your health care provider.

## 2025-05-30 DIAGNOSIS — R51.9 HEADACHE, UNSPECIFIED: ICD-10-CM

## 2025-05-30 DIAGNOSIS — R53.81 OTHER MALAISE: ICD-10-CM

## 2025-05-30 DIAGNOSIS — M79.10 MYALGIA, UNSPECIFIED SITE: ICD-10-CM

## 2025-05-30 DIAGNOSIS — J02.9 ACUTE PHARYNGITIS, UNSPECIFIED: ICD-10-CM

## 2025-07-08 NOTE — ED ADULT NURSE NOTE - PAIN: PRESENCE, MLM
Teaching Attestation:   I have discussed the history, exam and medical decision making with the resident Sammie Walker, DO and agree with the assessment and plan as documented. Patient staffed in collaboration with supervising PGY3.       Co-sleeping with parent, discussed sleeping in crib for safety and development     Concerns for recent fever, Tmax 100, no other symptoms. Physical exam reassuring today     denies pain/discomfort

## 2025-07-26 NOTE — ED PROVIDER NOTE - CADM POA URETHRAL CATHETER
No